# Patient Record
Sex: FEMALE | Race: WHITE | HISPANIC OR LATINO | Employment: UNEMPLOYED | URBAN - METROPOLITAN AREA
[De-identification: names, ages, dates, MRNs, and addresses within clinical notes are randomized per-mention and may not be internally consistent; named-entity substitution may affect disease eponyms.]

---

## 2018-01-11 NOTE — RESULT NOTES
Verified Results  (1) CBC/PLT/DIFF 29Apr2016 12:26PM [facility]     Test Name Result Flag Reference   WBC COUNT 4 32 Thousand/uL  4 31-10 16   RBC COUNT 4 75 Million/uL  3 81-5 12   HEMOGLOBIN 13 6 g/dL  11 5-15 4   HEMATOCRIT 40 6 %  34 8-46  1   MCV 86 fL  82-98   MCH 28 6 pg  26 8-34 3   MCHC 33 5 g/dL  31 4-37 4   RDW 13 5 %  11 6-15 1   MPV 10 7 fL  8 9-12 7   PLATELET COUNT 359 Thousands/uL  149-390   nRBC AUTOMATED 0 /100 WBCs     NEUTROPHILS RELATIVE PERCENT 53 %  43-75   LYMPHOCYTES RELATIVE PERCENT 33 %  14-44   MONOCYTES RELATIVE PERCENT 6 %  4-12   EOSINOPHILS RELATIVE PERCENT 7 % H 0-6   BASOPHILS RELATIVE PERCENT 1 %  0-1   NEUTROPHILS ABSOLUTE COUNT 2 29 Thousands/µL  1 85-7 62   LYMPHOCYTES ABSOLUTE COUNT 1 44 Thousands/µL  0 60-4 47   MONOCYTES ABSOLUTE COUNT 0 25 Thousand/µL  0 17-1 22   EOSINOPHILS ABSOLUTE COUNT 0 30 Thousand/µL  0 00-0 61   BASOPHILS ABSOLUTE COUNT 0 04 Thousands/µL  0 00-0 10     (1) COMPREHENSIVE METABOLIC PANEL [id number] 75:00WG [facility]   National Kidney Disease Education Program recommendations are as follows:  GFR calculation is accurate only with a steady state creatinine  Chronic Kidney disease less than 60 ml/min/1 73 sq  meters  Kidney failure less than 15 ml/min/1 73 sq  meters  Test Name Result Flag Reference   GLUCOSE,RANDM 75 mg/dL     If the patient is fasting, the ADA then defines impaired fasting glucose as > 100 mg/dL and diabetes as > or equal to 123 mg/dL     SODIUM 141 mmol/L  136-145   POTASSIUM 3 8 mmol/L  3 5-5 3   CHLORIDE 104 mmol/L  100-108   CARBON DIOXIDE 30 mmol/L  21-32   ANION GAP (CALC) 7 mmol/L  4-13   BLOOD UREA NITROGEN 12 mg/dL  5-25   CREATININE 0 70 mg/dL  0 60-1 30   Standardized to IDMS reference method   CALCIUM 8 7 mg/dL  8 3-10 1   BILI, TOTAL 0 60 mg/dL  0 20-1 00   ALK PHOSPHATAS 72 U/L     ALT (SGPT) 27 U/L  12-78   AST(SGOT) 20 U/L  5-45   ALBUMIN 3 5 g/dL  3 5-5 0   TOTAL PROTEIN 7 1 g/dL  6 4-8 2   eGFR Non-African American      >60 0 ml/min/1 73sq m     (1) URINALYSIS w URINE C/S REFLEX (will reflex a microscopy if leukocytes, occult blood, or nitrites are not within normal limits) 29Apr2016 12:26PM [facility]     Test Name Result Flag Reference   COLOR Yellow     CLARITY Clear     PH UA 5 5  4 5-8 0   LEUKOCYTE ESTERASE UA Negative  Negative   NITRITE UA Negative  Negative   PROTEIN UA Negative mg/dl  Negative   GLUCOSE UA Negative mg/dl  Negative   KETONES UA Negative mg/dl  Negative   UROBILINOGEN UA 0 2 E U /dl  0 2, 1 0 E U /dl   BILIRUBIN UA Negative  Negative   BLOOD UA Negative  Negative, Trace-Intact   SPECIFIC GRAVITY UA >=1 030  1 003-1 030

## 2021-04-15 ENCOUNTER — NURSE TRIAGE (OUTPATIENT)
Dept: OTHER | Facility: OTHER | Age: 46
End: 2021-04-15

## 2021-04-16 NOTE — TELEPHONE ENCOUNTER
Reason for Disposition   MILD difficulty breathing (e g , minimal/no SOB at rest, SOB with walking, pulse <100)    Answer Assessment - Initial Assessment Questions  1  COVID-19 DIAGNOSIS: "Who made your Coronavirus (COVID-19) diagnosis?" "Was it confirmed by a positive lab test?" If not diagnosed by a HCP, ask "Are there lots of cases (community spread) where you live?" (See public health department website, if unsure)      Test positive at outside agency  2  COVID-19 EXPOSURE: "Was there any known exposure to COVID before the symptoms began?" CDC Definition of close contact: within 6 feet (2 meters) for a total of 15 minutes or more over a 24-hour period  Unsure, works at "[employer]ve  3  ONSET: "When did the COVID-19 symptoms start?"       4/12  4  WORST SYMPTOM: "What is your worst symptom?" (e g , cough, fever, shortness of breath, muscle aches)      Fever  5  COUGH: "Do you have a cough?" If so, ask: "How bad is the cough?"        Intermittent  6  FEVER: "Do you have a fever?" If so, ask: "What is your temperature, how was it measured, and when did it start?"      100 5 earlier and 99 now  7  RESPIRATORY STATUS: "Describe your breathing?" (e g , shortness of breath, wheezing, unable to speak)       Intermittent shortness of breath  8  BETTER-SAME-WORSE: "Are you getting better, staying the same or getting worse compared to yesterday?"  If getting worse, ask, "In what way?"      Worse  9  HIGH RISK DISEASE: "Do you have any chronic medical problems?" (e g , asthma, heart or lung disease, weak immune system, obesity, etc )      Denies  10  PREGNANCY: "Is there any chance you are pregnant?" "When was your last menstrual period?"        Denies, LMP: last month  11   OTHER SYMPTOMS: "Do you have any other symptoms?"  (e g , chills, fatigue, headache, loss of smell or taste, muscle pain, sore throat; new loss of smell or taste especially support the diagnosis of COVID-19)        Loss of taste and smell, headache, body aches    Protocols used: CORONAVIRUS (COVID-19) DIAGNOSED OR SUSPECTED-ADULT-AH

## 2021-04-16 NOTE — TELEPHONE ENCOUNTER
Regarding: covid symptoms  #needs Lithuanian translater#  ----- Message from Brandi Olguin sent at 4/15/2021 11:18 PM EDT -----  "100 7, headache, chills and body aches, no smell or taste"

## 2021-04-17 ENCOUNTER — HOSPITAL ENCOUNTER (EMERGENCY)
Facility: HOSPITAL | Age: 46
Discharge: HOME | End: 2021-04-18
Attending: EMERGENCY MEDICINE | Admitting: EMERGENCY MEDICINE
Payer: COMMERCIAL

## 2021-04-17 ENCOUNTER — APPOINTMENT (EMERGENCY)
Dept: RADIOLOGY | Facility: HOSPITAL | Age: 46
End: 2021-04-17
Payer: COMMERCIAL

## 2021-04-17 VITALS
DIASTOLIC BLOOD PRESSURE: 57 MMHG | RESPIRATION RATE: 18 BRPM | TEMPERATURE: 99.4 F | HEART RATE: 68 BPM | SYSTOLIC BLOOD PRESSURE: 135 MMHG | OXYGEN SATURATION: 96 %

## 2021-04-17 DIAGNOSIS — U07.1 COVID-19: Primary | ICD-10-CM

## 2021-04-17 LAB
ALBUMIN SERPL BCP-MCNC: 3.3 G/DL (ref 3.5–5)
ALP SERPL-CCNC: 91 U/L (ref 46–116)
ALT SERPL W P-5'-P-CCNC: 70 U/L (ref 12–78)
ANION GAP SERPL CALCULATED.3IONS-SCNC: 6 MMOL/L (ref 4–13)
AST SERPL W P-5'-P-CCNC: 46 U/L (ref 5–45)
BASOPHILS # BLD AUTO: 0.01 THOUSANDS/ΜL (ref 0–0.1)
BASOPHILS NFR BLD AUTO: 0 % (ref 0–1)
BILIRUB SERPL-MCNC: 0.34 MG/DL (ref 0.2–1)
BUN SERPL-MCNC: 8 MG/DL (ref 5–25)
CALCIUM ALBUM COR SERPL-MCNC: 9.9 MG/DL (ref 8.3–10.1)
CALCIUM SERPL-MCNC: 9.3 MG/DL (ref 8.3–10.1)
CHLORIDE SERPL-SCNC: 103 MMOL/L (ref 100–108)
CO2 SERPL-SCNC: 28 MMOL/L (ref 21–32)
CREAT SERPL-MCNC: 0.73 MG/DL (ref 0.6–1.3)
EOSINOPHIL # BLD AUTO: 0.02 THOUSAND/ΜL (ref 0–0.61)
EOSINOPHIL NFR BLD AUTO: 1 % (ref 0–6)
ERYTHROCYTE [DISTWIDTH] IN BLOOD BY AUTOMATED COUNT: 14 % (ref 11.6–15.1)
GFR SERPL CREATININE-BSD FRML MDRD: 98 ML/MIN/1.73SQ M
GLUCOSE SERPL-MCNC: 105 MG/DL (ref 65–140)
HCT VFR BLD AUTO: 41.9 % (ref 34.8–46.1)
HGB BLD-MCNC: 13.7 G/DL (ref 11.5–15.4)
IMM GRANULOCYTES # BLD AUTO: 0.01 THOUSAND/UL (ref 0–0.2)
IMM GRANULOCYTES NFR BLD AUTO: 0 % (ref 0–2)
LYMPHOCYTES # BLD AUTO: 0.76 THOUSANDS/ΜL (ref 0.6–4.47)
LYMPHOCYTES NFR BLD AUTO: 19 % (ref 14–44)
MCH RBC QN AUTO: 27.7 PG (ref 26.8–34.3)
MCHC RBC AUTO-ENTMCNC: 32.7 G/DL (ref 31.4–37.4)
MCV RBC AUTO: 85 FL (ref 82–98)
MONOCYTES # BLD AUTO: 0.25 THOUSAND/ΜL (ref 0.17–1.22)
MONOCYTES NFR BLD AUTO: 6 % (ref 4–12)
NEUTROPHILS # BLD AUTO: 2.87 THOUSANDS/ΜL (ref 1.85–7.62)
NEUTS SEG NFR BLD AUTO: 74 % (ref 43–75)
NRBC BLD AUTO-RTO: 0 /100 WBCS
PLATELET # BLD AUTO: 146 THOUSANDS/UL (ref 149–390)
PMV BLD AUTO: 11 FL (ref 8.9–12.7)
POTASSIUM SERPL-SCNC: 4.1 MMOL/L (ref 3.5–5.3)
PROT SERPL-MCNC: 7.2 G/DL (ref 6.4–8.2)
RBC # BLD AUTO: 4.95 MILLION/UL (ref 3.81–5.12)
SODIUM SERPL-SCNC: 137 MMOL/L (ref 136–145)
WBC # BLD AUTO: 3.92 THOUSAND/UL (ref 4.31–10.16)

## 2021-04-17 PROCEDURE — 96374 THER/PROPH/DIAG INJ IV PUSH: CPT

## 2021-04-17 PROCEDURE — 85025 COMPLETE CBC W/AUTO DIFF WBC: CPT | Performed by: EMERGENCY MEDICINE

## 2021-04-17 PROCEDURE — 96361 HYDRATE IV INFUSION ADD-ON: CPT

## 2021-04-17 PROCEDURE — 93005 ELECTROCARDIOGRAM TRACING: CPT

## 2021-04-17 PROCEDURE — 36415 COLL VENOUS BLD VENIPUNCTURE: CPT | Performed by: EMERGENCY MEDICINE

## 2021-04-17 PROCEDURE — 99285 EMERGENCY DEPT VISIT HI MDM: CPT | Performed by: EMERGENCY MEDICINE

## 2021-04-17 PROCEDURE — 80053 COMPREHEN METABOLIC PANEL: CPT | Performed by: EMERGENCY MEDICINE

## 2021-04-17 PROCEDURE — 99284 EMERGENCY DEPT VISIT MOD MDM: CPT

## 2021-04-17 PROCEDURE — 71045 X-RAY EXAM CHEST 1 VIEW: CPT

## 2021-04-17 RX ORDER — ONDANSETRON 2 MG/ML
4 INJECTION INTRAMUSCULAR; INTRAVENOUS ONCE
Status: COMPLETED | OUTPATIENT
Start: 2021-04-17 | End: 2021-04-17

## 2021-04-17 RX ORDER — ACETAMINOPHEN 325 MG/1
975 TABLET ORAL ONCE
Status: COMPLETED | OUTPATIENT
Start: 2021-04-17 | End: 2021-04-17

## 2021-04-17 RX ADMIN — ACETAMINOPHEN 975 MG: 325 TABLET, FILM COATED ORAL at 22:45

## 2021-04-17 RX ADMIN — SODIUM CHLORIDE 1000 ML: 0.9 INJECTION, SOLUTION INTRAVENOUS at 22:45

## 2021-04-17 RX ADMIN — ONDANSETRON 4 MG: 2 INJECTION INTRAMUSCULAR; INTRAVENOUS at 22:45

## 2021-04-18 RX ORDER — ONDANSETRON 4 MG/1
4 TABLET, ORALLY DISINTEGRATING ORAL EVERY 6 HOURS PRN
Qty: 10 TABLET | Refills: 0 | Status: SHIPPED | OUTPATIENT
Start: 2021-04-18 | End: 2021-06-22

## 2021-04-18 NOTE — ED ATTENDING ATTESTATION
4/17/2021  Veena Coughlin DO, saw and evaluated the patient  I have discussed the patient with the resident/non-physician practitioner and agree with the resident's/non-physician practitioner's findings, Plan of Care, and MDM as documented in the resident's/non-physician practitioner's note, except where noted  All available labs and Radiology studies were reviewed  I was present for key portions of any procedure(s) performed by the resident/non-physician practitioner and I was immediately available to provide assistance  At this point I agree with the current assessment done in the Emergency Department  I have conducted an independent evaluation of this patient a history and physical is as follows:    49 yo female w/recent dx COVID19 c/o feeling unwell in general  HA myalgia, dyspnea, dry cough, n/v/d  Decreased PO intake  SpO2 96%  Lungs CTAB    Imp: COVID19 plan: CBC, BMP, CXR, amb SpO2, tx sx, reassess   Likely will be able to d/c home, continue symptomatic tx and isolation      ED Course         Critical Care Time  Procedures

## 2021-04-18 NOTE — DISCHARGE INSTRUCTIONS
Tylenol and motrin for pain as needed  Drink plenty of fluids  Monitor your oxygen level at home, return to the hospital if persistently <90%  Return to the emergency department for severe worsening of pain, persistent vomiting with inability to drink, increased difficulty breathing, any other new or concerning symptoms

## 2021-04-18 NOTE — ED PROVIDER NOTES
History  Chief Complaint   Patient presents with    Fever - 9 weeks to 74 years     pt states "I got diagnosed with CPVID-19 on Tuesday  I feel terrible"  pt states she had a 104 fever at home  HPI  51 yo female with no significant known PMH presents to the ED with concern for cough, congestion, SOB, chest tightness/pressure, fevers, chills, myalgias, headache, nausea, vomiting, and diarrhea x 5 days  Pt was diagnosed positive with COVID 4 days ago  States she has been taking ibuprofen without significant relief  Last dose was 400mg at 2000  Reports she has vomited twice today and has not had much to eat or drink  States she has lost her sense of taste and smell  Denies blood in stool, urinary sxs, abdominal pain, lightheadedness, numbness, weakness, leg swelling, hx DVT/PE, recent surgery, recent travel  Pt has not been monitoring her oxygen saturation at home  She does not have a primary care physician  None       History reviewed  No pertinent past medical history  History reviewed  No pertinent surgical history  History reviewed  No pertinent family history  I have reviewed and agree with the history as documented  E-Cigarette/Vaping     E-Cigarette/Vaping Substances     Social History     Tobacco Use    Smoking status: Never Smoker    Smokeless tobacco: Never Used   Substance Use Topics    Alcohol use: Never     Frequency: Never    Drug use: Not on file        Review of Systems   Constitutional: Positive for chills, fatigue and fever  HENT: Positive for congestion  Negative for rhinorrhea and sore throat  Respiratory: Positive for chest tightness and shortness of breath  Cardiovascular: Positive for chest pain (pressure)  Negative for leg swelling  Gastrointestinal: Positive for diarrhea, nausea and vomiting  Negative for abdominal pain and blood in stool  Genitourinary: Negative for dysuria and hematuria  Musculoskeletal: Positive for myalgias   Negative for arthralgias, gait problem, neck pain and neck stiffness  Skin: Negative for rash  Neurological: Positive for headaches  Negative for dizziness, syncope, weakness, light-headedness and numbness  All other systems reviewed and are negative  Physical Exam  ED Triage Vitals [04/17/21 2202]   Temperature Pulse Respirations Blood Pressure SpO2   99 4 °F (37 4 °C) 68 18 135/57 96 %      Temp Source Heart Rate Source Patient Position - Orthostatic VS BP Location FiO2 (%)   Oral Monitor Lying Right arm --      Pain Score       5             Orthostatic Vital Signs  Vitals:    04/17/21 2202   BP: 135/57   Pulse: 68   Patient Position - Orthostatic VS: Lying       Physical Exam  Vitals signs and nursing note reviewed  Constitutional:       General: She is not in acute distress  Appearance: She is well-developed  She is not diaphoretic  HENT:      Head: Normocephalic and atraumatic  Right Ear: External ear normal       Left Ear: External ear normal       Nose: Nose normal       Mouth/Throat:      Mouth: Mucous membranes are moist    Eyes:      Conjunctiva/sclera: Conjunctivae normal       Pupils: Pupils are equal, round, and reactive to light  Neck:      Musculoskeletal: Full passive range of motion without pain, normal range of motion and neck supple  No neck rigidity  Cardiovascular:      Rate and Rhythm: Normal rate and regular rhythm  Heart sounds: Normal heart sounds  No murmur  No friction rub  No gallop  Pulmonary:      Effort: Pulmonary effort is normal  No respiratory distress  Breath sounds: Normal breath sounds  No wheezing, rhonchi or rales  Chest:      Chest wall: Tenderness present  Abdominal:      General: Bowel sounds are normal  There is no distension  Palpations: Abdomen is soft  There is no mass  Tenderness: There is no abdominal tenderness  There is no guarding or rebound  Musculoskeletal: Normal range of motion        Right lower leg: She exhibits no tenderness and no swelling  No edema  Left lower leg: She exhibits no tenderness and no swelling  No edema  Lymphadenopathy:      Cervical: No cervical adenopathy  Skin:     General: Skin is warm and dry  Neurological:      Mental Status: She is alert and oriented to person, place, and time  Cranial Nerves: No cranial nerve deficit  Sensory: No sensory deficit           ED Medications  Medications   sodium chloride 0 9 % bolus 1,000 mL (0 mL Intravenous Stopped 4/18/21 0051)   ondansetron (ZOFRAN) injection 4 mg (4 mg Intravenous Given 4/17/21 2245)   acetaminophen (TYLENOL) tablet 975 mg (975 mg Oral Given 4/17/21 2245)       Diagnostic Studies  Results Reviewed     Procedure Component Value Units Date/Time    Comprehensive metabolic panel [[id number]]  (Abnormal) Collected: 04/17/21 2244    Lab Status: Final result Specimen: Blood from Arm, Right Updated: 04/17/21 2317     Sodium 137 mmol/L      Potassium 4 1 mmol/L      Chloride 103 mmol/L      CO2 28 mmol/L      ANION GAP 6 mmol/L      BUN 8 mg/dL      Creatinine 0 73 mg/dL      Glucose 105 mg/dL      Calcium 9 3 mg/dL      Corrected Calcium 9 9 mg/dL      AST 46 U/L      ALT 70 U/L      Alkaline Phosphatase 91 U/L      Total Protein 7 2 g/dL      Albumin 3 3 g/dL      Total Bilirubin 0 34 mg/dL      eGFR 98 ml/min/1 73sq m     Narrative:      [place] guidelines for Chronic Kidney Disease (CKD):     Stage 1 with normal or high GFR (GFR > 90 mL/min/1 73 square meters)    Stage 2 Mild CKD (GFR = 60-89 mL/min/1 73 square meters)    Stage 3A Moderate CKD (GFR = 45-59 mL/min/1 73 square meters)    Stage 3B Moderate CKD (GFR = 30-44 mL/min/1 73 square meters)    Stage 4 Severe CKD (GFR = 15-29 mL/min/1 73 square meters)    Stage 5 End Stage CKD (GFR <15 mL/min/1 73 square meters)  Note: GFR calculation is accurate only with a steady state creatinine    CBC and differential [[id number]]  (Abnormal) Collected: 04/17/21 2244    Lab Status: Final result Specimen: Blood from Arm, Right Updated: 04/17/21 2252     WBC 3 92 Thousand/uL      RBC 4 95 Million/uL      Hemoglobin 13 7 g/dL      Hematocrit 41 9 %      MCV 85 fL      MCH 27 7 pg      MCHC 32 7 g/dL      RDW 14 0 %      MPV 11 0 fL      Platelets 356 Thousands/uL      nRBC 0 /100 WBCs      Neutrophils Relative 74 %      Immat GRANS % 0 %      Lymphocytes Relative 19 %      Monocytes Relative 6 %      Eosinophils Relative 1 %      Basophils Relative 0 %      Neutrophils Absolute 2 87 Thousands/µL      Immature Grans Absolute 0 01 Thousand/uL      Lymphocytes Absolute 0 76 Thousands/µL      Monocytes Absolute 0 25 Thousand/µL      Eosinophils Absolute 0 02 Thousand/µL      Basophils Absolute 0 01 Thousands/µL                  XR chest 1 view portable   ED Interpretation by Dominguez Shipley MD (04/17 2243)   No focal consolidation, effusion, or pneumothorax appreciated            Procedures  Procedures      ED Course  ED Course as of Apr 18 1109   Sat Apr 17, 2021   2343 EKG sinus bradycardia rate 56  Normal axis  Nonspecific ST elevation V1  T wave inversion V1  Qtc 403  No prior for comparison  Normal EKG  Sun Apr 18, 2021   0006 Pt reports she is feeling a little better  Will discharge with strict return precautions and PCP follow up                                          MDM  Number of Diagnoses or Management Options  COVID-19:   49 yo female with hx recent COVID dx presenting with cough, congestion, SOB, chest tightness/pressure, fevers, chills, myalgias, headache, nausea, vomiting, and diarrhea consistent with COVID infection  Pt with normal vital signs in the ED  Will obtain CBC, CMP, EKG, CXR, to evaluate for leukocytosis, anemia, electrolyte abnormality, renal dysfunction, hepatic dysfunction, acute cardiac abnormality, focal pneumonia, pneumothorax  Will treat with tylenol, zofran, IV fluids, and reassess    If no acute findings and ambulatory pulse ox normal in the ED pt can likely be discharged with strict return precautions and follow up with [facility] clinic  Disposition  Final diagnoses:   [id number]     Time reflects when diagnosis was documented in both MDM as applicable and the Disposition within this note     Time User Action Codes Description Comment    4/18/2021 12:07 AM Nick Gandhi Add [U07 1] COVID-19       ED Disposition     ED Disposition Condition Date/Time Comment    Discharge Stable Sun Apr 18, 2021 12:07 AM Violetta Issa discharge to home/self care  Follow-up Information     Follow up With Specialties Details Why Contact Info Additional [facility] Internal Medicine Schedule an appointment as soon as possible for a visit in 1 week or your primary care physician [place] [phone] [place] [phone]  [facility] [address], [address] [place] [address], [phone]          There are no discharge medications for this patient  No discharge procedures on file  PDMP Review     None           ED Provider  Attending physically available and evaluated Violetta Issa I managed the patient along with the ED Attending      Electronically Signed by         Claudia Ramos MD  04/18/21 2927

## 2021-04-18 NOTE — ED NOTES
Pt ambulated up and down Zone 4 with oxygen constant at 94%/95%       Carol Ann Espino  04/17/21 5781

## 2021-04-20 LAB
ATRIAL RATE: 56 BPM
P AXIS: 58 DEGREES
PR INTERVAL: 188 MS
QRS AXIS: 55 DEGREES
QRSD INTERVAL: 80 MS
QT INTERVAL: 418 MS
QTC INTERVAL: 403 MS
T WAVE AXIS: 53 DEGREES
VENTRICULAR RATE: 56 BPM

## 2021-04-20 PROCEDURE — 93010 ELECTROCARDIOGRAM REPORT: CPT | Performed by: INTERNAL MEDICINE

## 2021-04-21 ENCOUNTER — APPOINTMENT (EMERGENCY)
Dept: RADIOLOGY | Facility: HOSPITAL | Age: 46
End: 2021-04-21
Payer: COMMERCIAL

## 2021-04-21 ENCOUNTER — HOSPITAL ENCOUNTER (OUTPATIENT)
Facility: HOSPITAL | Age: 46
Setting detail: OBSERVATION
Discharge: HOME | End: 2021-04-22
Attending: EMERGENCY MEDICINE | Admitting: INTERNAL MEDICINE
Payer: COMMERCIAL

## 2021-04-21 DIAGNOSIS — N94.9 ADNEXAL CYST: ICD-10-CM

## 2021-04-21 DIAGNOSIS — M79.10 MYALGIA: ICD-10-CM

## 2021-04-21 DIAGNOSIS — U07.1 COVID-19 VIRUS INFECTION: ICD-10-CM

## 2021-04-21 DIAGNOSIS — U07.1 COVID-19: ICD-10-CM

## 2021-04-21 DIAGNOSIS — R10.9 ABDOMINAL PAIN: Primary | ICD-10-CM

## 2021-04-21 LAB
ALBUMIN SERPL BCP-MCNC: 3.1 G/DL (ref 3.5–5)
ALP SERPL-CCNC: 90 U/L (ref 46–116)
ALT SERPL W P-5'-P-CCNC: 58 U/L (ref 12–78)
ANION GAP SERPL CALCULATED.3IONS-SCNC: 5 MMOL/L (ref 4–13)
APAP SERPL-MCNC: 3 UG/ML (ref 10–20)
AST SERPL W P-5'-P-CCNC: 54 U/L (ref 5–45)
ATRIAL RATE: 81 BPM
BASOPHILS # BLD AUTO: 0.01 THOUSANDS/ΜL (ref 0–0.1)
BASOPHILS NFR BLD AUTO: 0 % (ref 0–1)
BILIRUB SERPL-MCNC: 0.36 MG/DL (ref 0.2–1)
BILIRUB UR QL STRIP: NEGATIVE
BUN SERPL-MCNC: 5 MG/DL (ref 5–25)
CALCIUM ALBUM COR SERPL-MCNC: 9.7 MG/DL (ref 8.3–10.1)
CALCIUM SERPL-MCNC: 9 MG/DL (ref 8.3–10.1)
CHLORIDE SERPL-SCNC: 104 MMOL/L (ref 100–108)
CLARITY UR: CLEAR
CO2 SERPL-SCNC: 29 MMOL/L (ref 21–32)
COLOR UR: YELLOW
CREAT SERPL-MCNC: 0.61 MG/DL (ref 0.6–1.3)
EOSINOPHIL # BLD AUTO: 0.02 THOUSAND/ΜL (ref 0–0.61)
EOSINOPHIL NFR BLD AUTO: 1 % (ref 0–6)
ERYTHROCYTE [DISTWIDTH] IN BLOOD BY AUTOMATED COUNT: 13.6 % (ref 11.6–15.1)
EXT PREG TEST URINE: NEGATIVE
EXT. CONTROL ED NAV: NORMAL
GFR SERPL CREATININE-BSD FRML MDRD: 108 ML/MIN/1.73SQ M
GLUCOSE SERPL-MCNC: 100 MG/DL (ref 65–140)
GLUCOSE UR STRIP-MCNC: NEGATIVE MG/DL
HCT VFR BLD AUTO: 42.9 % (ref 34.8–46.1)
HGB BLD-MCNC: 13.9 G/DL (ref 11.5–15.4)
HGB UR QL STRIP.AUTO: NEGATIVE
IMM GRANULOCYTES # BLD AUTO: 0.01 THOUSAND/UL (ref 0–0.2)
IMM GRANULOCYTES NFR BLD AUTO: 0 % (ref 0–2)
KETONES UR STRIP-MCNC: ABNORMAL MG/DL
LEUKOCYTE ESTERASE UR QL STRIP: NEGATIVE
LIPASE SERPL-CCNC: 59 U/L (ref 73–393)
LYMPHOCYTES # BLD AUTO: 0.72 THOUSANDS/ΜL (ref 0.6–4.47)
LYMPHOCYTES NFR BLD AUTO: 19 % (ref 14–44)
MCH RBC QN AUTO: 27.2 PG (ref 26.8–34.3)
MCHC RBC AUTO-ENTMCNC: 32.4 G/DL (ref 31.4–37.4)
MCV RBC AUTO: 84 FL (ref 82–98)
MONOCYTES # BLD AUTO: 0.21 THOUSAND/ΜL (ref 0.17–1.22)
MONOCYTES NFR BLD AUTO: 6 % (ref 4–12)
NEUTROPHILS # BLD AUTO: 2.84 THOUSANDS/ΜL (ref 1.85–7.62)
NEUTS SEG NFR BLD AUTO: 74 % (ref 43–75)
NITRITE UR QL STRIP: NEGATIVE
NRBC BLD AUTO-RTO: 0 /100 WBCS
P AXIS: 75 DEGREES
PH UR STRIP.AUTO: 7.5 [PH]
PLATELET # BLD AUTO: 172 THOUSANDS/UL (ref 149–390)
PMV BLD AUTO: 10.7 FL (ref 8.9–12.7)
POTASSIUM SERPL-SCNC: 3.7 MMOL/L (ref 3.5–5.3)
PR INTERVAL: 160 MS
PROT SERPL-MCNC: 7.3 G/DL (ref 6.4–8.2)
PROT UR STRIP-MCNC: NEGATIVE MG/DL
QRS AXIS: 50 DEGREES
QRSD INTERVAL: 88 MS
QT INTERVAL: 352 MS
QTC INTERVAL: 408 MS
RBC # BLD AUTO: 5.11 MILLION/UL (ref 3.81–5.12)
SODIUM SERPL-SCNC: 138 MMOL/L (ref 136–145)
SP GR UR STRIP.AUTO: 1.02 (ref 1–1.03)
T WAVE AXIS: 76 DEGREES
TROPONIN I SERPL-MCNC: <0.02 NG/ML
UROBILINOGEN UR QL STRIP.AUTO: 1 E.U./DL
VENTRICULAR RATE: 81 BPM
WBC # BLD AUTO: 3.81 THOUSAND/UL (ref 4.31–10.16)

## 2021-04-21 PROCEDURE — 36415 COLL VENOUS BLD VENIPUNCTURE: CPT | Performed by: STUDENT IN AN ORGANIZED HEALTH CARE EDUCATION/TRAINING PROGRAM

## 2021-04-21 PROCEDURE — NC001 PR NO CHARGE: Performed by: INTERNAL MEDICINE

## 2021-04-21 PROCEDURE — 71275 CT ANGIOGRAPHY CHEST: CPT

## 2021-04-21 PROCEDURE — 83735 ASSAY OF MAGNESIUM: CPT | Performed by: STUDENT IN AN ORGANIZED HEALTH CARE EDUCATION/TRAINING PROGRAM

## 2021-04-21 PROCEDURE — 96376 TX/PRO/DX INJ SAME DRUG ADON: CPT

## 2021-04-21 PROCEDURE — 82728 ASSAY OF FERRITIN: CPT | Performed by: STUDENT IN AN ORGANIZED HEALTH CARE EDUCATION/TRAINING PROGRAM

## 2021-04-21 PROCEDURE — 96375 TX/PRO/DX INJ NEW DRUG ADDON: CPT

## 2021-04-21 PROCEDURE — 96361 HYDRATE IV INFUSION ADD-ON: CPT

## 2021-04-21 PROCEDURE — 74174 CTA ABD&PLVS W/CONTRAST: CPT

## 2021-04-21 PROCEDURE — 93010 ELECTROCARDIOGRAM REPORT: CPT | Performed by: INTERNAL MEDICINE

## 2021-04-21 PROCEDURE — 83690 ASSAY OF LIPASE: CPT | Performed by: STUDENT IN AN ORGANIZED HEALTH CARE EDUCATION/TRAINING PROGRAM

## 2021-04-21 PROCEDURE — 99285 EMERGENCY DEPT VISIT HI MDM: CPT | Performed by: EMERGENCY MEDICINE

## 2021-04-21 PROCEDURE — 85025 COMPLETE CBC W/AUTO DIFF WBC: CPT | Performed by: STUDENT IN AN ORGANIZED HEALTH CARE EDUCATION/TRAINING PROGRAM

## 2021-04-21 PROCEDURE — 80143 DRUG ASSAY ACETAMINOPHEN: CPT | Performed by: STUDENT IN AN ORGANIZED HEALTH CARE EDUCATION/TRAINING PROGRAM

## 2021-04-21 PROCEDURE — 99285 EMERGENCY DEPT VISIT HI MDM: CPT

## 2021-04-21 PROCEDURE — 81025 URINE PREGNANCY TEST: CPT | Performed by: STUDENT IN AN ORGANIZED HEALTH CARE EDUCATION/TRAINING PROGRAM

## 2021-04-21 PROCEDURE — 87086 URINE CULTURE/COLONY COUNT: CPT | Performed by: STUDENT IN AN ORGANIZED HEALTH CARE EDUCATION/TRAINING PROGRAM

## 2021-04-21 PROCEDURE — 86140 C-REACTIVE PROTEIN: CPT | Performed by: STUDENT IN AN ORGANIZED HEALTH CARE EDUCATION/TRAINING PROGRAM

## 2021-04-21 PROCEDURE — G1004 CDSM NDSC: HCPCS

## 2021-04-21 PROCEDURE — 96374 THER/PROPH/DIAG INJ IV PUSH: CPT

## 2021-04-21 PROCEDURE — 81003 URINALYSIS AUTO W/O SCOPE: CPT | Performed by: STUDENT IN AN ORGANIZED HEALTH CARE EDUCATION/TRAINING PROGRAM

## 2021-04-21 PROCEDURE — 93005 ELECTROCARDIOGRAM TRACING: CPT

## 2021-04-21 PROCEDURE — 80053 COMPREHEN METABOLIC PANEL: CPT | Performed by: STUDENT IN AN ORGANIZED HEALTH CARE EDUCATION/TRAINING PROGRAM

## 2021-04-21 PROCEDURE — 84484 ASSAY OF TROPONIN QUANT: CPT | Performed by: STUDENT IN AN ORGANIZED HEALTH CARE EDUCATION/TRAINING PROGRAM

## 2021-04-21 RX ORDER — HYDROMORPHONE HCL/PF 1 MG/ML
0.5 SYRINGE (ML) INJECTION ONCE
Status: COMPLETED | OUTPATIENT
Start: 2021-04-21 | End: 2021-04-21

## 2021-04-21 RX ORDER — ONDANSETRON 2 MG/ML
4 INJECTION INTRAMUSCULAR; INTRAVENOUS ONCE
Status: COMPLETED | OUTPATIENT
Start: 2021-04-21 | End: 2021-04-21

## 2021-04-21 RX ORDER — PANTOPRAZOLE SODIUM 20 MG/1
20 TABLET, DELAYED RELEASE ORAL
Status: DISCONTINUED | OUTPATIENT
Start: 2021-04-21 | End: 2021-04-22 | Stop reason: HOSPADM

## 2021-04-21 RX ORDER — MELATONIN
1000 DAILY
Status: DISCONTINUED | OUTPATIENT
Start: 2021-04-21 | End: 2021-04-22 | Stop reason: HOSPADM

## 2021-04-21 RX ORDER — SODIUM CHLORIDE, SODIUM GLUCONATE, SODIUM ACETATE, POTASSIUM CHLORIDE, MAGNESIUM CHLORIDE, SODIUM PHOSPHATE, DIBASIC, AND POTASSIUM PHOSPHATE .53; .5; .37; .037; .03; .012; .00082 G/100ML; G/100ML; G/100ML; G/100ML; G/100ML; G/100ML; G/100ML
200 INJECTION, SOLUTION INTRAVENOUS CONTINUOUS
Status: DISCONTINUED | OUTPATIENT
Start: 2021-04-21 | End: 2021-04-22 | Stop reason: HOSPADM

## 2021-04-21 RX ORDER — HYDROMORPHONE HCL/PF 1 MG/ML
0.5 SYRINGE (ML) INJECTION ONCE
Status: DISCONTINUED | OUTPATIENT
Start: 2021-04-21 | End: 2021-04-22 | Stop reason: HOSPADM

## 2021-04-21 RX ORDER — SUCRALFATE 1 G/1
1 TABLET ORAL
Status: DISCONTINUED | OUTPATIENT
Start: 2021-04-21 | End: 2021-04-22 | Stop reason: HOSPADM

## 2021-04-21 RX ADMIN — ONDANSETRON 4 MG: 2 INJECTION INTRAMUSCULAR; INTRAVENOUS at 18:45

## 2021-04-21 RX ADMIN — SODIUM CHLORIDE 1000 ML: 0.9 INJECTION, SOLUTION INTRAVENOUS at 18:45

## 2021-04-21 RX ADMIN — HYDROMORPHONE HYDROCHLORIDE 0.5 MG: 1 INJECTION, SOLUTION INTRAMUSCULAR; INTRAVENOUS; SUBCUTANEOUS at 19:03

## 2021-04-21 RX ADMIN — ONDANSETRON 4 MG: 2 INJECTION INTRAMUSCULAR; INTRAVENOUS at 20:30

## 2021-04-21 RX ADMIN — IOHEXOL 100 ML: 350 INJECTION, SOLUTION INTRAVENOUS at 19:42

## 2021-04-21 NOTE — ED PROVIDER NOTES
History  Chief Complaint   Patient presents with    Flu Symptoms     Covid positive 10 days ago symptoms not getting better     Patient is a 77-year-old female, with no significant past medical history, who presents to the emergency department for 10 days of worsening COVID-19 symptoms, including myalgias, fatigue, abdominal pain, chest pain, and cough  Patient states she was seen here on the 17th for identical symptoms, but was ultimately discharged  She states since then, the symptoms have only gotten worse  Patient states the abdominal pain has become so bad, she cannot take it anymore  She states she has tried taking Tylenol and ibuprofen but has not had any relief  Associated symptoms include fever, chills, constipation, nausea, and vomiting  Patient denies any urinary symptoms, back pain, headaches, vision changes, or any other new worsening symptoms  Of note, patient states the pain is to the point where she does not want to wake up in the morning any more  She does endorse passive suicidal ideation, but denies any intent or plan of wanting to end her life  She denies any prior history of suicide ideation or attempts  She denies any homicidal ideation  She denies any auditory visual hallucinations  Last normal menstrual period 2 weeks ago  Prior to Admission Medications   Prescriptions Last Dose Informant Patient Reported? Taking?   ondansetron (ZOFRAN-ODT) 4 mg disintegrating tablet Not Taking at Unknown time  No No   Sig: Take 1 tablet (4 mg total) by mouth every 6 (six) hours as needed for nausea or vomiting   Patient not taking: Reported on 4/21/2021      Facility-Administered Medications: None       History reviewed  No pertinent past medical history  Past Surgical History:   Procedure Laterality Date    COSMETIC SURGERY Bilateral     Breast implants       History reviewed  No pertinent family history    I have reviewed and agree with the history as documented  E-Cigarette/Vaping     E-Cigarette/Vaping Substances     Social History     Tobacco Use    Smoking status: Never Smoker    Smokeless tobacco: Never Used   Substance Use Topics    Alcohol use: Never     Frequency: Never    Drug use: Not on file        Review of Systems   Constitutional: Positive for chills, fatigue and fever  HENT: Positive for sore throat  Negative for trouble swallowing and voice change  Eyes: Negative for photophobia and visual disturbance  Respiratory: Positive for cough  Negative for shortness of breath  Cardiovascular: Positive for chest pain  Negative for leg swelling  Gastrointestinal: Positive for abdominal pain, constipation, nausea and vomiting  Negative for diarrhea  Genitourinary: Negative for difficulty urinating, dysuria and hematuria  Musculoskeletal: Positive for myalgias  Negative for back pain, neck pain and neck stiffness  Skin: Negative for rash and wound  Neurological: Negative for dizziness and headaches  Psychiatric/Behavioral: Negative for agitation and confusion  All other systems reviewed and are negative  Physical Exam  ED Triage Vitals   Temperature Pulse Respirations Blood Pressure SpO2   04/21/21 1807 04/21/21 1807 04/21/21 1807 04/21/21 1807 04/21/21 1807   98 8 °F (37 1 °C) 105 20 147/68 94 %      Temp Source Heart Rate Source Patient Position - Orthostatic VS BP Location FiO2 (%)   04/21/21 1807 04/21/21 1807 -- -- --   Oral Monitor         Pain Score       04/21/21 1903       8             Orthostatic Vital Signs  Vitals:    04/21/21 1930 04/21/21 2032 04/21/21 2230 04/22/21 0029   BP: 123/68 121/67 113/67 115/63   Pulse: 68 64 64 65       Physical Exam  Vitals signs and nursing note reviewed  Constitutional:       General: She is not in acute distress  Appearance: She is well-developed  She is not diaphoretic  HENT:      Head: Normocephalic and atraumatic        Right Ear: External ear normal       Left Ear: External ear normal       Nose: Nose normal    Eyes:      General: Lids are normal  No scleral icterus  Neck:      Musculoskeletal: Normal range of motion and neck supple  Cardiovascular:      Rate and Rhythm: Normal rate and regular rhythm  Heart sounds: Normal heart sounds  No murmur  No friction rub  No gallop  Pulmonary:      Effort: Pulmonary effort is normal  No respiratory distress  Breath sounds: Normal breath sounds  No wheezing or rales  Abdominal:      Palpations: Abdomen is soft  Tenderness: There is abdominal tenderness in the right lower quadrant and periumbilical area  There is no guarding or rebound  Musculoskeletal: Normal range of motion  General: No deformity  Skin:     General: Skin is warm and dry  Capillary Refill: Capillary refill takes less than 2 seconds  Neurological:      General: No focal deficit present  Mental Status: She is alert  Psychiatric:         Mood and Affect: Mood normal  Affect is tearful  Behavior: Behavior normal          Thought Content: Thought content does not include homicidal ideation  Thought content does not include homicidal or suicidal plan           ED Medications  Medications   HYDROmorphone (DILAUDID) injection 0 5 mg (0 5 mg Intravenous Not Given 4/21/21 2033)   cholecalciferol (VITAMIN D3) tablet 1,000 Units (0 Units Oral Hold 4/22/21 0032)   enoxaparin (LOVENOX) subcutaneous injection 30 mg (has no administration in time range)   pantoprazole (PROTONIX) EC tablet 20 mg (0 mg Oral Hold 4/22/21 0033)   sucralfate (CARAFATE) tablet 1 g (0 g Oral Hold 4/22/21 0033)   multi-electrolyte (PLASMALYTE-A/ISOLYTE-S PH 7 4) IV solution (100 mL/hr Intravenous New Bag 4/22/21 0026)   ondansetron (ZOFRAN) injection 4 mg (4 mg Intravenous Given 4/21/21 1845)   sodium chloride 0 9 % bolus 1,000 mL (0 mL Intravenous Stopped 4/21/21 1945)   HYDROmorphone (DILAUDID) injection 0 5 mg (0 5 mg Intravenous Given 4/21/21 1903) iohexol (OMNIPAQUE) 350 MG/ML injection (MULTI-DOSE) 100 mL (100 mL Intravenous Given 4/21/21 1942)   ondansetron (ZOFRAN) injection 4 mg (4 mg Intravenous Given 4/21/21 2030)   metoclopramide (REGLAN) injection 10 mg (10 mg Intravenous Given 4/22/21 0025)       Diagnostic Studies  Results Reviewed     Procedure Component Value Units Date/Time    D-dimer, quantitative [[id number]] Collected: 04/22/21 0032    Lab Status:  In process Specimen: Blood from Arm, Right Updated: 04/22/21 0041    Magnesium [[id number]]     Lab Status: No result Specimen: Blood     Ferritin [[id number]]     Lab Status: No result Specimen: Blood     C-reactive protein [[id number]]     Lab Status: No result Specimen: Blood     Acetaminophen level-"If concentration is detectable, please discuss with medical  on call " [[id number]]  (Abnormal) Collected: 04/21/21 1844    Lab Status: Final result Specimen: Blood from Arm, Right Updated: 04/21/21 2117     Acetaminophen Level 3 ug/mL     Troponin I [[id number]]  (Normal) Collected: 04/21/21 1844    Lab Status: Final result Specimen: Blood from Arm, Right Updated: 04/21/21 1936     Troponin I <0 02 ng/mL     Comprehensive metabolic panel [[id number]]  (Abnormal) Collected: 04/21/21 1844    Lab Status: Final result Specimen: Blood from Arm, Right Updated: 04/21/21 1914     Sodium 138 mmol/L      Potassium 3 7 mmol/L      Chloride 104 mmol/L      CO2 29 mmol/L      ANION GAP 5 mmol/L      BUN 5 mg/dL      Creatinine 0 61 mg/dL      Glucose 100 mg/dL      Calcium 9 0 mg/dL      Corrected Calcium 9 7 mg/dL      AST 54 U/L      ALT 58 U/L      Alkaline Phosphatase 90 U/L      Total Protein 7 3 g/dL      Albumin 3 1 g/dL      Total Bilirubin 0 36 mg/dL      eGFR 108 ml/min/1 73sq m     Narrative:      [place] guidelines for Chronic Kidney Disease (CKD):     Stage 1 with normal or high GFR (GFR > 90 mL/min/1 73 square meters)    Stage 2 Mild CKD (GFR = 60-89 mL/min/1 73 square meters)    Stage 3A Moderate CKD (GFR = 45-59 mL/min/1 73 square meters)    Stage 3B Moderate CKD (GFR = 30-44 mL/min/1 73 square meters)    Stage 4 Severe CKD (GFR = 15-29 mL/min/1 73 square meters)    Stage 5 End Stage CKD (GFR <15 mL/min/1 73 square meters)  Note: GFR calculation is accurate only with a steady state creatinine    Lipase [[id number]]  (Abnormal) Collected: 04/21/21 1844    Lab Status: Final result Specimen: Blood from Arm, Right Updated: 04/21/21 1914     Lipase 59 u/L     UA w Reflex to Microscopic w Reflex to Culture [[id number]]  (Abnormal) Collected: 04/21/21 1853    Lab Status: Final result Specimen: Urine, Clean Catch Updated: 04/21/21 1911     Color, UA Yellow     Clarity, UA Clear     Specific [place], UA 1 020     pH, UA 7 5     Leukocytes, UA Negative     Nitrite, UA Negative     Protein, UA Negative mg/dl      Glucose, UA Negative mg/dl      Ketones, UA Trace mg/dl      Urobilinogen, UA 1 0 E U /dl      Bilirubin, UA Negative     Blood, UA Negative     URINE COMMENT --    Urine culture [[id number]] Collected: 04/21/21 1853    Lab Status:  In process Specimen: Urine, Clean Catch Updated: 04/21/21 1911    CBC and differential [[id number]]  (Abnormal) Collected: 04/21/21 1844    Lab Status: Final result Specimen: Blood from Arm, Right Updated: 04/21/21 1901     WBC 3 81 Thousand/uL      RBC 5 11 Million/uL      Hemoglobin 13 9 g/dL      Hematocrit 42 9 %      MCV 84 fL      MCH 27 2 pg      MCHC 32 4 g/dL      RDW 13 6 %      MPV 10 7 fL      Platelets 795 Thousands/uL      nRBC 0 /100 WBCs      Neutrophils Relative 74 %      Immat GRANS % 0 %      Lymphocytes Relative 19 %      Monocytes Relative 6 %      Eosinophils Relative 1 %      Basophils Relative 0 %      Neutrophils Absolute 2 84 Thousands/µL      Immature Grans Absolute 0 01 Thousand/uL      Lymphocytes Absolute 0 72 Thousands/µL      Monocytes Absolute 0 21 Thousand/µL      Eosinophils Absolute 0 02 Thousand/µL      Basophils Absolute 0 01 Thousands/µL     POCT pregnancy, urine [[id number]]  (Normal) Resulted: 04/21/21 1854    Lab Status: Final result Updated: 04/21/21 1854     EXT PREG TEST UR (Ref: Negative) negative     Control valid                 CTA dissection protocol chest abdomen pelvis w wo contrast   Final Result by Andi Lundborg, MD (04/21 2032)      1  Thoracic, abdominal aorta and branches demonstrate no aneurysm or dissection  No thrombus  2   Bilateral peripheral confluent ill-defined groundglass changes  In the setting of clinically suspected/proven COVID-19, the above lung parenchymal findings on CT indicate intermediate confidence level for COVID-19       3  Circumferential cecal wall thickening suggested to be correlated clinically and/or  ultrasound  Fluid in the endometrial canal   Bilateral adnexal cysts              Workstation performed: [id number]               Procedures  ECG 12 Lead Documentation Only    Date/Time: 4/21/2021 6:35 PM  Performed by: Charline Culp DO  Authorized by: Charline Culp DO     ECG reviewed by me, the ED Provider: yes    Patient location:  ED  Interpretation:     Interpretation: normal    Rate:     ECG rate:  81    ECG rate assessment: normal    Rhythm:     Rhythm: sinus rhythm    Ectopy:     Ectopy: none    QRS:     QRS axis:  Normal  Conduction:     Conduction: normal    ST segments:     ST segments:  Normal  T waves:     T waves: normal            ED Course  ED Course as of Apr 22 0105 Wed Apr 21, 2021 1855 PREGNANCY TEST URINE: negative   1902 WBC(!): 3 81   1902 Hemoglobin: 13 9   1902 Platelet Count: 1500 [facility], UA(!): Trace   1915 Lipase(!): 59   1915 Sodium: 138   1915 Potassium: 3 7   1915 eGFR: 108   1915 Creatinine: 0 61   1953 Troponin I: <0 02   2039 CTA dissection protocol chest abdomen pelvis w wo contrast                                       MDM  Number of Diagnoses or Management Options  Abdominal pain:   COVID-19:   Myalgia:   Diagnosis management comments: Patient is a 50 y o  female who presented to the ED for worsening COVID-19 symptoms  In the ED, patient was tachycardic, hypertensive, not tachypneic and afebrile  Significant physical exam findings include periumbilical and RLQ abdominal pain  Differential includes but is not limited to:  Dissection, COVID-19, mesenteric ischemia, colitis, pancreatitis, diverticulitis, appendicitis  Plan:  CBC, CMP, lipase, UA, urine preg, EKG, troponin, CT abdomen pelvis with contrast    ED interventions:  Dilaudid, Zofran, IV fluids    Findings: CT A/P showed "Bilateral peripheral confluent ill-defined groundglass changes  In the setting of clinically suspected/proven COVID-19, the above lung parenchymal findings on CT indicate intermediate confidence level for COVID-19  Circumferential cecal wall thickening suggested to be correlated clinically and/or  ultrasound  Fluid in the endometrial canal   Bilateral adnexal cysts " Labs were otherwise unremarkable  Reassessment: Patient was re-evaluated  States her abdominal pain initially improved, but the pain has returned and is severe  States she is fearful of going home 2/2 to the pain  Will give another dose of dilaudid and zofran  Disposition:  Admit for intractable abdominal pain        Portions of the record may have been created with voice recognition software  Occasional wrong word or "sound a like" substitutions may have occurred due to the inherent limitations of voice recognition software  Read the chart carefully and recognize, using context, where substitutions have occurred           Amount and/or Complexity of Data Reviewed  Clinical lab tests: ordered and reviewed  Tests in the radiology section of CPT®: reviewed and ordered  Tests in the medicine section of CPT®: ordered and reviewed  Review and summarize past medical records: yes  Independent visualization of images, tracings, or specimens: yes    Risk of Complications, Morbidity, and/or Mortality  Presenting problems: high  Diagnostic procedures: high  Management options: high    Patient Progress  Patient progress: stable      Disposition  Final diagnoses:   Abdominal pain   Myalgia   COVID-19     Time reflects when diagnosis was documented in both MDM as applicable and the Disposition within this note     Time User Action Codes Description Comment    4/21/2021  8:49 PM Silvia Alpha Add [R10 9] Abdominal pain     4/21/2021 10:15 PM Silvia Alpha Add [H25 25] Myalgia     4/21/2021 10:15 PM Eloy TeixeiraShashi Jay [U07 1] COVID-19       ED Disposition     ED Disposition Condition Date/Time Comment    Admit Stable Wed Apr 21, 2021 10:26 PM Case was discussed with Dr Johanna Claire and the patient's admission status was agreed to be Admission Status: observation status to the service of Dr Rossi   Follow-up Information    None         Patient's Medications   Discharge Prescriptions    No medications on file     No discharge procedures on file  PDMP Review     None           ED Provider  Attending physically available and evaluated [facility]  I managed the patient along with the ED Attending      Electronically Signed by         Estuardo Vegas DO  04/22/21 0105

## 2021-04-21 NOTE — Clinical Note
Case was discussed with and the patient's admission status was agreed to be Admission Status: observation status to the service of

## 2021-04-22 VITALS
BODY MASS INDEX: 31.45 KG/M2 | WEIGHT: 177.47 LBS | SYSTOLIC BLOOD PRESSURE: 109 MMHG | HEART RATE: 68 BPM | HEIGHT: 63 IN | TEMPERATURE: 99 F | DIASTOLIC BLOOD PRESSURE: 58 MMHG | RESPIRATION RATE: 16 BRPM | OXYGEN SATURATION: 95 %

## 2021-04-22 PROBLEM — R51.9 HEADACHE: Status: ACTIVE | Noted: 2021-04-22

## 2021-04-22 PROBLEM — R51.9 HEADACHE: Status: RESOLVED | Noted: 2021-04-22 | Resolved: 2021-04-22

## 2021-04-22 LAB
ANION GAP SERPL CALCULATED.3IONS-SCNC: 8 MMOL/L (ref 4–13)
BACTERIA UR CULT: NORMAL
BUN SERPL-MCNC: 6 MG/DL (ref 5–25)
CALCIUM SERPL-MCNC: 8.1 MG/DL (ref 8.3–10.1)
CHLORIDE SERPL-SCNC: 105 MMOL/L (ref 100–108)
CO2 SERPL-SCNC: 26 MMOL/L (ref 21–32)
CREAT SERPL-MCNC: 0.53 MG/DL (ref 0.6–1.3)
CRP SERPL QL: 73 MG/L
D DIMER PPP FEU-MCNC: <0.27 UG/ML FEU
ERYTHROCYTE [DISTWIDTH] IN BLOOD BY AUTOMATED COUNT: 13.7 % (ref 11.6–15.1)
FERRITIN SERPL-MCNC: 146 NG/ML (ref 8–388)
GFR SERPL CREATININE-BSD FRML MDRD: 113 ML/MIN/1.73SQ M
GLUCOSE SERPL-MCNC: 95 MG/DL (ref 65–140)
HCT VFR BLD AUTO: 36.8 % (ref 34.8–46.1)
HGB BLD-MCNC: 12 G/DL (ref 11.5–15.4)
MAGNESIUM SERPL-MCNC: 2.1 MG/DL (ref 1.6–2.6)
MCH RBC QN AUTO: 27.1 PG (ref 26.8–34.3)
MCHC RBC AUTO-ENTMCNC: 32.6 G/DL (ref 31.4–37.4)
MCV RBC AUTO: 83 FL (ref 82–98)
PLATELET # BLD AUTO: 180 THOUSANDS/UL (ref 149–390)
PMV BLD AUTO: 10.5 FL (ref 8.9–12.7)
POTASSIUM SERPL-SCNC: 3.5 MMOL/L (ref 3.5–5.3)
RBC # BLD AUTO: 4.43 MILLION/UL (ref 3.81–5.12)
SODIUM SERPL-SCNC: 139 MMOL/L (ref 136–145)
WBC # BLD AUTO: 3.15 THOUSAND/UL (ref 4.31–10.16)

## 2021-04-22 PROCEDURE — 36415 COLL VENOUS BLD VENIPUNCTURE: CPT | Performed by: STUDENT IN AN ORGANIZED HEALTH CARE EDUCATION/TRAINING PROGRAM

## 2021-04-22 PROCEDURE — 99218 PR INITIAL OBSERVATION CARE/DAY 30 MINUTES: CPT | Performed by: INTERNAL MEDICINE

## 2021-04-22 PROCEDURE — NC001 PR NO CHARGE: Performed by: INTERNAL MEDICINE

## 2021-04-22 PROCEDURE — 85379 FIBRIN DEGRADATION QUANT: CPT | Performed by: STUDENT IN AN ORGANIZED HEALTH CARE EDUCATION/TRAINING PROGRAM

## 2021-04-22 PROCEDURE — 80048 BASIC METABOLIC PNL TOTAL CA: CPT | Performed by: STUDENT IN AN ORGANIZED HEALTH CARE EDUCATION/TRAINING PROGRAM

## 2021-04-22 PROCEDURE — 85027 COMPLETE CBC AUTOMATED: CPT | Performed by: STUDENT IN AN ORGANIZED HEALTH CARE EDUCATION/TRAINING PROGRAM

## 2021-04-22 RX ORDER — PANTOPRAZOLE SODIUM 20 MG/1
40 TABLET, DELAYED RELEASE ORAL
Qty: 45 TABLET | Refills: 3 | Status: SHIPPED | OUTPATIENT
Start: 2021-04-23 | End: 2021-06-22

## 2021-04-22 RX ORDER — MELATONIN
1000 DAILY
Qty: 30 TABLET | Refills: 1 | Status: SHIPPED | OUTPATIENT
Start: 2021-04-23 | End: 2021-06-22

## 2021-04-22 RX ORDER — ONDANSETRON 2 MG/ML
4 INJECTION INTRAMUSCULAR; INTRAVENOUS EVERY 6 HOURS PRN
Status: DISCONTINUED | OUTPATIENT
Start: 2021-04-22 | End: 2021-04-22 | Stop reason: HOSPADM

## 2021-04-22 RX ORDER — METOCLOPRAMIDE HYDROCHLORIDE 5 MG/ML
10 INJECTION INTRAMUSCULAR; INTRAVENOUS ONCE
Status: COMPLETED | OUTPATIENT
Start: 2021-04-22 | End: 2021-04-22

## 2021-04-22 RX ORDER — SUCRALFATE 1 G/1
1 TABLET ORAL
Qty: 30 TABLET | Refills: 1 | Status: SHIPPED | OUTPATIENT
Start: 2021-04-22 | End: 2021-06-22

## 2021-04-22 RX ADMIN — PANTOPRAZOLE SODIUM 20 MG: 20 TABLET, DELAYED RELEASE ORAL at 06:02

## 2021-04-22 RX ADMIN — SUCRALFATE 1 G: 1 TABLET ORAL at 06:02

## 2021-04-22 RX ADMIN — SODIUM CHLORIDE, SODIUM GLUCONATE, SODIUM ACETATE, POTASSIUM CHLORIDE, MAGNESIUM CHLORIDE, SODIUM PHOSPHATE, DIBASIC, AND POTASSIUM PHOSPHATE 100 ML/HR: .53; .5; .37; .037; .03; .012; .00082 INJECTION, SOLUTION INTRAVENOUS at 03:57

## 2021-04-22 RX ADMIN — SODIUM CHLORIDE, SODIUM GLUCONATE, SODIUM ACETATE, POTASSIUM CHLORIDE, MAGNESIUM CHLORIDE, SODIUM PHOSPHATE, DIBASIC, AND POTASSIUM PHOSPHATE 100 ML/HR: .53; .5; .37; .037; .03; .012; .00082 INJECTION, SOLUTION INTRAVENOUS at 00:26

## 2021-04-22 RX ADMIN — Medication 1000 UNITS: at 09:53

## 2021-04-22 RX ADMIN — SODIUM CHLORIDE, SODIUM GLUCONATE, SODIUM ACETATE, POTASSIUM CHLORIDE, MAGNESIUM CHLORIDE, SODIUM PHOSPHATE, DIBASIC, AND POTASSIUM PHOSPHATE 100 ML/HR: .53; .5; .37; .037; .03; .012; .00082 INJECTION, SOLUTION INTRAVENOUS at 03:56

## 2021-04-22 RX ADMIN — METOCLOPRAMIDE HYDROCHLORIDE 10 MG: 5 INJECTION INTRAMUSCULAR; INTRAVENOUS at 00:25

## 2021-04-22 NOTE — ASSESSMENT & PLAN NOTE
Symptoms started 4/11, Tested positive 4/13  Patient is endorsing subjective continued fevers, but has been on tylenol and NSAIDs around the clock since symptoms started  Patient VSS on room air  -patient with b/l clear lungs, on room air, occasional dry cough unchanged from prior, not endorsing SOB    Plan:  -will follow COVID Mild pathway per protocol and per patient's subjective fever today  -contact precautions until patient afebrile for 24 hours while off of antipyretics  -lovenox 30q12 per group C "lower intensity" guidelines  Patient with VTE screen of 1 otherwise, not necessitating DVT prophylaxis  -Vit D 2000 daily  -Vitamin C and Zinc per mild pathway protocol as patient is able to tolerate   Consider holding if patient continues to have nausea as these can be irritating to the stomach

## 2021-04-22 NOTE — ASSESSMENT & PLAN NOTE
Plan:  -gastritis 2/2 excessive NSAID use, with underlying gastroenteritis since axel COVID, although do not suspect that this pain and nausea is primarily due to a viral etiology  Patient consuming at least 11 tablets of Motrin a day for the last 10-12 days  Endorses worsening of epigastric/LUQ abdominal pain with NSAID consumption  Do not suspect this is gynecologic in origin  Patient with history of adnexal cysts, but CT abd/pelvis without free-fluid, and patient without lower quadrant TTP or pain  Menstruates monthly- last 4/6-4/13  Patient does get cramps with menstruation, but is no longer menstruating or spotting  Patient is not pregnant   -symptom control with:   -AVOID NSAIDS   -PPI daily- Prilosec 20 mg qd   -carafate   -Zofran q6h prn (QTc acceptable)  -clear liquid diet until nausea/vomiting controlled  Advance as tolerated  -Pain control with Tylenol PRN  Patient did not respond to dilaudid in ED- Avoid narcotics  -educated patient on avoiding ibuprofen overuse, or any use in the upcoming future

## 2021-04-22 NOTE — PLAN OF CARE
Problem: Potential for Falls  Goal: Patient will remain free of falls  Description: INTERVENTIONS:  - Assess patient frequently for physical needs  -  Identify cognitive and physical deficits and behaviors that affect risk of falls    -  [place] fall precautions as indicated by assessment   - Educate patient/family on patient safety including physical limitations  - Instruct patient to call for assistance with activity based on assessment  - Modify environment to reduce risk of injury  - Consider OT/PT consult to assist with strengthening/mobility  Outcome: Progressing     Problem: PAIN - ADULT  Goal: Verbalizes/displays adequate comfort level or baseline comfort level  Description: Interventions:  - Encourage patient to monitor pain and request assistance  - Assess pain using appropriate pain scale  - Administer analgesics based on type and severity of pain and evaluate response  - Implement non-pharmacological measures as appropriate and evaluate response  - Consider cultural and social influences on pain and pain management  - Notify physician/advanced practitioner if interventions unsuccessful or patient reports new pain  Outcome: Progressing     Problem: INFECTION - ADULT  Goal: Absence or prevention of progression during hospitalization  Description: INTERVENTIONS:  - Assess and monitor for signs and symptoms of infection  - Monitor lab/diagnostic results  - Monitor all insertion sites, i e  indwelling lines, tubes, and drains  - Monitor endotracheal if appropriate and nasal secretions for changes in amount and color  - [place] appropriate cooling/warming therapies per order  - Administer medications as ordered  - Instruct and encourage patient and family to use good hand hygiene technique  - Identify and instruct in appropriate isolation precautions for identified infection/condition  Outcome: Progressing     Problem: SAFETY ADULT  Goal: Patient will remain free of falls  Description: INTERVENTIONS:  - Assess patient frequently for physical needs  -  Identify cognitive and physical deficits and behaviors that affect risk of falls    -  [place] fall precautions as indicated by assessment   - Educate patient/family on patient safety including physical limitations  - Instruct patient to call for assistance with activity based on assessment  - Modify environment to reduce risk of injury  - Consider OT/PT consult to assist with strengthening/mobility  Outcome: Progressing  Goal: Maintain or return to baseline ADL function  Description: INTERVENTIONS:  -  Assess patient's ability to carry out ADLs; assess patient's baseline for ADL function and identify physical deficits which impact ability to perform ADLs (bathing, care of mouth/teeth, toileting, grooming, dressing, etc )  - Assess/evaluate cause of self-care deficits   - Assess range of motion  - Assess patient's mobility; develop plan if impaired  - Assess patient's need for assistive devices and provide as appropriate  - Encourage maximum independence but intervene and supervise when necessary  - Involve family in performance of ADLs  - Assess for home care needs following discharge   - Consider OT consult to assist with ADL evaluation and planning for discharge  - Provide patient education as appropriate  Outcome: Progressing  Goal: Maintain or return mobility status to optimal level  Description: INTERVENTIONS:  - Assess patient's baseline mobility status (ambulation, transfers, stairs, etc )    - Identify cognitive and physical deficits and behaviors that affect mobility  - Identify mobility aids required to assist with transfers and/or ambulation (gait belt, sit-to-stand, lift, walker, cane, etc )  - [place] fall precautions as indicated by assessment  - Record patient progress and toleration of activity level on Mobility SBAR; progress patient to next Phase/Stage  - Instruct patient to call for assistance with activity based on assessment  - Consider rehabilitation consult to assist with strengthening/weightbearing, etc   Outcome: Progressing     Problem: DISCHARGE PLANNING  Goal: Discharge to home or other facility with appropriate resources  Description: INTERVENTIONS:  - Identify barriers to discharge w/patient and caregiver  - Arrange for needed discharge resources and transportation as appropriate  - Identify discharge learning needs (meds, wound care, etc )  - Arrange for interpretive services to assist at discharge as needed  - Refer to Case Management Department for coordinating discharge planning if the patient needs post-hospital services based on physician/advanced practitioner order or complex needs related to functional status, cognitive ability, or social support system  Outcome: Progressing     Problem: Knowledge Deficit  Goal: Patient/family/caregiver demonstrates understanding of disease process, treatment plan, medications, and discharge instructions  Description: Complete learning assessment and assess knowledge base    Interventions:  - Provide teaching at level of understanding  - Provide teaching via preferred learning methods  Outcome: Progressing     Problem: RESPIRATORY - ADULT  Goal: Achieves optimal ventilation and oxygenation  Description: INTERVENTIONS:  - Assess for changes in respiratory status  - Assess for changes in mentation and behavior  - Position to facilitate oxygenation and minimize respiratory effort  - Oxygen administered by appropriate delivery if ordered  - Initiate smoking cessation education as indicated  - Encourage broncho-pulmonary hygiene including cough, deep breathe, Incentive Spirometry  - Assess the need for suctioning and aspirate as needed  - Assess and instruct to report SOB or any respiratory difficulty  - Respiratory Therapy support as indicated  Outcome: Progressing     Problem: GASTROINTESTINAL - ADULT  Goal: Minimal or absence of nausea and/or vomiting  Description: INTERVENTIONS:  - Administer IV fluids if ordered to ensure adequate hydration  - Maintain NPO status until nausea and vomiting are resolved  - Nasogastric tube if ordered  - Administer ordered antiemetic medications as needed  - Provide nonpharmacologic comfort measures as appropriate  - Advance diet as tolerated, if ordered  - Consider nutrition services referral to assist patient with adequate nutrition and appropriate food choices  Outcome: Progressing  Goal: Maintains or returns to baseline bowel function  Description: INTERVENTIONS:  - Assess bowel function  - Encourage oral fluids to ensure adequate hydration  - Administer IV fluids if ordered to ensure adequate hydration  - Administer ordered medications as needed  - Encourage mobilization and activity  - Consider nutritional services referral to assist patient with adequate nutrition and appropriate food choices  Outcome: Progressing  Goal: Maintains adequate nutritional intake  Description: INTERVENTIONS:  - Monitor percentage of each meal consumed  - Identify factors contributing to decreased intake, treat as appropriate  - Assist with meals as needed  - Monitor I&O, weight, and lab values if indicated  - Obtain nutrition services referral as needed  Outcome: Progressing

## 2021-04-22 NOTE — ASSESSMENT & PLAN NOTE
Plan:  -supportive care and treatment of n/v  Headache likely due to minimal p o  food and fluid intake the last 24 hours    -IVF hydration, nausea control, avoid NSAIDS  -clear diet - advance as tolerated  -magnesium level >2, no repletion necessary

## 2021-04-22 NOTE — DISCHARGE SUMMARY
INTERNAL MEDICINE RESIDENCY DISCHARGE SUMMARY     Randell Baird   50 y o  female  MRN: [id number]  Room/Bed: /MS 18-5     [facility] 7   Encounter: [id number]    Principal Problem:    Abdominal pain  Active Problems:    COVID-19 virus infection    Headache    Abdominal pain  -likely some degree of gastritis secondary to excessive NSAID use compounded by possible in COVID gastroenteritis  CT with dissection protocol of abdomen and pelvis demonstrated some free fluid endometrium, possible cervical wall thickening and bilateral adnexal cysts as well as some circumferential cecal wall thickening  -counseled on avoiding NSAID use  -increased p o  Intake as tolerated, s/p IVF hydration   -p r n  Zofran  -Protonix 40 mg q d  COVID-19  -tested positive 4/13 with symptom onset 4/11    -remained AVSS during hospitalization without supplemental oxygen requirements  -MVI    DETAILS OF HOSPITAL COURSE     HPI per Dr Donnie Ellis, DO: 47-year-old female with no significant past medical history who returns to [facility] ED after recent visit on 04/17 for COVID, with the complaint of worsening of her COVID-19 symptoms, in addition to worsening abdominal pain  The patient tested positive for COVID-19 on 4/13  In total, patient's symptoms started 10 days (4/11) ago with myalgias, fatigue, cough, chest pain and abdominal pain  When she was seen on the 17th she was stable and sent home for self supportive care and isolation  Patient returns today saying she cannot take her abdominal pain any longer  She endorses subjective fever and chills, nausea, vomiting, anosmia, dysgeusia, headache, and no relief with Tylenol or Motrin      Patient is VSS on presentation, but is endorsing abdominal pain so severe that she "doesn't want to live with it " No active SI however   Patient states she has consumed at minimum 11 ibuprofen a day since symptoms started on 4/11, in addition to at least 5 tylenol a day  Patient states she takes these so frequently because she thought it would help her stomach, but notes that when she takes the motrin it makes her stomach cramp more and she feels nauseous  She has not had a bowel movement in the last 1 5 days because she has had minimal tolerance of p o  intake  She was previously having looser stools 2-3 times a day, early on in her COVID course  She has vomited a few times today- yellow liquid contents  Pain is localized in the LUQ and epigastric area and is cramping in nature, exacerbated by motrin and food       Denying: SOB, worsening cough, productive cough, CP, hematemesis, diarrhea  She received IV fluid hydration and p r n  Zofran during her hospital course with symptomatic relief  She remained afebrile with remainder vital signs stable throughout hospitalization with no electrolyte abnormalities and tolerating clear liquid diet time of discharge  She was counseled to avoid NSAID use  She was provided ambulatory referral to OBGYN for outpatient follow-up of her incidentally noted adnexal cysts  She was also provided information for the [facility] to establish care with a PCP  DISCHARGE INFORMATION     PCP at Discharge: Cliff Cardona provided, encouraged to establish care with PCP as soon as possible    Admitting Provider: Kira Humphreys MD  Admission Date: 4/21/2021    Discharge Provider: Kira Humphreys MD  Discharge Date: 4/22/2021    Discharge Disposition: Home/Self Care  Discharge Condition: stable  Discharge with Lines: no    Discharge Diet: diet as tolerated  Activity Restrictions: none  Test Results Pending at Discharge: none     Discharge Diagnoses:  Principal Problem:    Abdominal pain  Active Problems:    COVID-19 virus infection    Headache  Resolved Problems:    * No resolved hospital problems   *    Consulting Providers:  -none     Diagnostic & Therapeutic Procedures Performed:  Cta Dissection Protocol Chest Abdomen Pelvis W Wo Contrast    Result Date: 4/21/2021  Impression: 1  Thoracic, abdominal aorta and branches demonstrate no aneurysm or dissection  No thrombus  2   Bilateral peripheral confluent ill-defined groundglass changes  In the setting of clinically suspected/proven COVID-19, the above lung parenchymal findings on CT indicate intermediate confidence level for COVID-19  3  Circumferential cecal wall thickening suggested to be correlated clinically and/or  ultrasound  Fluid in the endometrial canal   Bilateral adnexal cysts  Workstation performed: [id number]       Code Status: Level 1 - Full Code  Advance Directive & Living Will: <no information>  Power of :    POLST:      Medications:  -See AVS    Allergies:  No Known Allergies    Physical Exam  Constitutional:       General: She is not in acute distress  Appearance: She is not ill-appearing, toxic-appearing or diaphoretic  Comments: Laying comfortably   HENT:      Head: Normocephalic and atraumatic  Right Ear: External ear normal       Left Ear: External ear normal       Nose: Nose normal  No congestion or rhinorrhea  Mouth/Throat:      Mouth: Mucous membranes are moist       Pharynx: Oropharynx is clear  Eyes:      General: No scleral icterus  Conjunctiva/sclera: Conjunctivae normal       Pupils: Pupils are equal, round, and reactive to light  Neck:      Musculoskeletal: Normal range of motion and neck supple  Cardiovascular:      Rate and Rhythm: Normal rate and regular rhythm  Pulses: Normal pulses  Heart sounds: Normal heart sounds  No murmur  No friction rub  No gallop  Pulmonary:      Effort: Pulmonary effort is normal  No respiratory distress  Breath sounds: Normal breath sounds  No stridor  No wheezing or rales  Comments: Saturating 98% on RA   Abdominal:      General: Abdomen is flat  Bowel sounds are normal  There is no distension  Tenderness: There is no guarding or rebound  Comments: Mild nonspecific TTP   Musculoskeletal:      Right lower leg: No edema  Left lower leg: No edema  Skin:     General: Skin is warm and dry  Capillary Refill: Capillary refill takes less than 2 seconds  Coloration: Skin is not jaundiced  Neurological:      General: No focal deficit present  Mental Status: She is alert and oriented to person, place, and time  FOLLOW-UP     PCP Outpatient Follow-up:  Encouraged to establish care with a PCP within 1 week following discharge    Consulting Providers Follow-up:  none required     Active Issues Requiring Follow-up:   none    Discharge Statement:   I spent 30 minutes minutes discharging the patient  This time was spent on the day of discharge  I had direct contact with the patient on the day of discharge  Additional documentation is required if more than 30 minutes were spent on discharge  Portions of the record may have been created with voice recognition software  Occasional wrong word or "sound a like" substitutions may have occurred due to the inherent limitations of voice recognition software    Read the chart carefully and recognize, using context, where substitutions have occurred     ==  Kp Toure MD  [address]  Internal Medicine, PGY-I

## 2021-04-22 NOTE — PROGRESS NOTES
[facility] Senior Admission Note   Unit/Bed # @DBLINK (LGPHUONG,22586)@ Encounter: [id number]  SOD Team A    Deanne Audrey 50 y o  female [id number]    Patient seen and examined  Reviewed H&P per Dr Amber Weeks  Agree with the assessment and plan as documented  Assessment/Plan: Principal Problem:    Abdominal pain  Active Problems:    COVID-19 virus infection     49 yo F with recent COVID infection presenting with a number of worsening symptoms including worsening abdominal pain as well as worsening cough  She was seen on 4/17 for COVID but discharged to home with recommendation for supportive care and isolation  CTA abdomen and pelvis today shows circumferential cecal wall thickening  UA was checked in ED and unremarkable except for ketones  Pt reports significant NSAID usage prior to admission  Given that pt reports fever within last 24h and worsening cough symptoms, will continue isolation precautions for COVID and obtain baseline labs for mild pathway  Abdominal pain appears to be secondary to gastritis from NSAID usage vs cecal inflammation secondary to COVID  Will treat supportively with clear liquid diet for today but consider advancing diet tomorrow AM if tolerated, continue tylenol PRN pain with trial of prilosec and carafate  Continue gentle hydration given vomiting and poor oral intake with ketones noted on UA       Disposition:  OBSERVATION    Expected LOS: <2 Midnights    Brittany Gibson DO

## 2021-04-22 NOTE — H&P
INTERNAL MEDICINE RESIDENCY ADMISSION H&P     Name: Olga Corcoran   Age & Sex: 50 y o  female   MRN: [id number]  Unit/Bed#: QCA   Encounter: [id number]  Primary Care Provider: No primary care provider on file  Code Status: Level 1 - Full Code  Admission Status: OBSERVATION  Disposition: Patient requires Med/Surg    Admit to team: SOD Team A    ASSESSMENT/PLAN     Principal Problem:    Abdominal pain  Active Problems:    COVID-19 virus infection    Headache      * Abdominal pain  Assessment & Plan  Plan:  -gastritis 2/2 excessive NSAID use, with underlying gastroenteritis since axel COVID, although do not suspect that this pain and nausea is primarily due to a viral etiology  Patient consuming at least 11 tablets of Motrin a day for the last 10-12 days  Endorses worsening of epigastric/LUQ abdominal pain with NSAID consumption  Do not suspect this is gynecologic in origin  Patient with history of adnexal cysts, but CT abd/pelvis without free-fluid, and patient without lower quadrant TTP or pain  Menstruates monthly- last 4/6-4/13  Patient does get cramps with menstruation, but is no longer menstruating or spotting  Patient is not pregnant   -symptom control with:   -AVOID NSAIDS   -PPI daily- Prilosec 20 mg qd   -carafate   -Zofran q6h prn (QTc acceptable)  -clear liquid diet until nausea/vomiting controlled  Advance as tolerated  -Pain control with Tylenol PRN  Patient did not respond to dilaudid in ED- Avoid narcotics  -educated patient on avoiding ibuprofen overuse, or any use in the upcoming future  COVID-19 virus infection  Assessment & Plan  Symptoms started 4/11, Tested positive 4/13  Patient is endorsing subjective continued fevers, but has been on tylenol and NSAIDs around the clock since symptoms started      Patient VSS on room air  -patient with b/l clear lungs, on room air, occasional dry cough unchanged from prior, not endorsing SOB    Plan:  -will follow COVID Mild pathway per protocol and per patient's subjective fever today  -contact precautions until patient afebrile for 24 hours while off of antipyretics  -lovenox 30q12 per group C "lower intensity" guidelines  Patient with VTE screen of 1 otherwise, not necessitating DVT prophylaxis  -Vit D 2000 daily  -Vitamin C and Zinc per mild pathway protocol as patient is able to tolerate  Consider holding if patient continues to have nausea as these can be irritating to the stomach        Headache  Assessment & Plan  Plan:  -supportive care and treatment of n/v  Headache likely due to minimal p o  food and fluid intake the last 24 hours  -IVF hydration, nausea control, avoid NSAIDS  -clear diet - advance as tolerated  -magnesium level >2, no repletion necessary      VTE Pharmacologic Prophylaxis: Enoxaparin (Lovenox)  VTE Mechanical Prophylaxis: sequential compression device    CHIEF COMPLAINT     Chief Complaint   Patient presents with    Flu Symptoms     Covid positive 10 days ago symptoms not getting better      HISTORY OF PRESENT ILLNESS     24-year-old female with no significant past medical history who returns to [facility] ED after recent visit on 04/17 for COVID, with the complaint of worsening of her COVID-19 symptoms, in addition to worsening abdominal pain  The patient tested positive for COVID-19 on 4/13  In total, patient's symptoms started 10 days (4/11) ago with myalgias, fatigue, cough, chest pain and abdominal pain  When she was seen on the 17th she was stable and sent home for self supportive care and isolation  Patient returns today saying she cannot take her abdominal pain any longer  She endorses subjective fever and chills, nausea, vomiting, anosmia, dysgeusia, headache, and no relief with Tylenol or Motrin  Patient is VSS on presentation, but is endorsing abdominal pain so severe that she "doesn't want to live with it " No active SI however   Patient states she has consumed at minimum 11 ibuprofen a day since symptoms started on , in addition to at least 5 tylenol a day  Patient states she takes these so frequently because she thought it would help her stomach, but notes that when she takes the motrin it makes her stomach cramp more and she feels nauseous  She has not had a bowel movement in the last 1 5 days because she has had minimal tolerance of p o  intake  She was previously having looser stools 2-3 times a day, early on in her COVID course  She has vomited a few times today- yellow liquid contents  Pain is localized in the LUQ and epigastric area and is cramping in nature, exacerbated by motrin and food  Denying: SOB, worsening cough, productive cough, CP, hematemesis, diarrhea  (-) EtOH, tobacco, and drug use    REVIEW OF SYSTEMS     Review of Systems   Constitutional: Positive for appetite change (decreased 2/2 anosmia/dysguesia and nausea), chills and fever  Negative for activity change  Respiratory: Positive for cough (unchanged since last ED visit)  Negative for chest tightness, shortness of breath and wheezing  Cardiovascular: Negative for chest pain, palpitations and leg swelling  Gastrointestinal: Positive for abdominal pain, diarrhea (up to 3 loose BM a day, but hasn't had loose stools in a few days), nausea and vomiting  Negative for abdominal distention, blood in stool and constipation  Genitourinary: Negative for dysuria  Neurological: Positive for light-headedness (when going from laying flat to standing) and headaches  Psychiatric/Behavioral: Negative for confusion and self-injury       OBJECTIVE     Vitals:    21 1930 21 2032 21 2230 21 0029   BP: 123/68 121/67 113/67 115/63   Pulse: 68 64 64 65   Resp: 18 18 18 16   Temp:       TempSrc:       SpO2: 97% 97% 95% 98%   Weight:          Temperature:   Temp (24hrs), Av 8 °F (37 1 °C), Min:98 8 °F (37 1 °C), Max:98 8 °F (37 1 °C)    Temperature: 98 8 °F (37 1 °C)  Intake & Output:  I/O        0701 - 04/21 0700 04/21 0701 - 04/22 0700    IV Piggyback  1000    Total Intake(mL/kg)  1000 (12 4)    Net  +1000              Weights:        Body mass index is 31 44 kg/m²  Weight (last 2 days)     Date/Time   Weight    04/21/21 1807   80 5 (177 47)            Physical Exam  Constitutional:       General: She is not in acute distress  Appearance: She is not ill-appearing, toxic-appearing or diaphoretic  HENT:      Mouth/Throat:      Mouth: Mucous membranes are dry  Cardiovascular:      Rate and Rhythm: Normal rate and regular rhythm  Pulses: Normal pulses  Heart sounds: No murmur  Pulmonary:      Effort: Pulmonary effort is normal  No respiratory distress  Breath sounds: Normal breath sounds  No wheezing, rhonchi or rales  Abdominal:      General: There is no distension  Tenderness: There is abdominal tenderness  There is no guarding  Comments: Ivy-umbilical and RUQ   Musculoskeletal:      Right lower leg: No edema  Left lower leg: No edema  Skin:     General: Skin is warm and dry  Neurological:      Mental Status: She is alert and oriented to person, place, and time  Psychiatric:         Mood and Affect: Mood normal          Thought Content: Thought content normal        PAST MEDICAL HISTORY   History reviewed  No pertinent past medical history  PAST SURGICAL HISTORY     Past Surgical History:   Procedure Laterality Date    COSMETIC SURGERY Bilateral     Breast implants     SOCIAL & FAMILY HISTORY     Social History     Substance and Sexual Activity   Alcohol Use Never    Frequency: Never     Substance and Sexual Activity   Alcohol Use Never    Frequency: Never        Substance and Sexual Activity   Drug Use Not on file     Social History     Tobacco Use   Smoking Status Never Smoker   Smokeless Tobacco Never Used     History reviewed  No pertinent family history  LABORATORY DATA     Labs: I have personally reviewed pertinent reports      Results from last 7 days Lab Units 04/21/21  1844 04/17/21  2244   WBC Thousand/uL 3 81* 3 92*   HEMOGLOBIN g/dL 13 9 13 7   HEMATOCRIT % 42 9 41 9   PLATELETS Thousands/uL 172 146*   NEUTROS PCT % 74 74   MONOS PCT % 6 6      Results from last 7 days   Lab Units 04/21/21  1844 04/17/21  2244   POTASSIUM mmol/L 3 7 4 1   CHLORIDE mmol/L 104 103   CO2 mmol/L 29 28   BUN mg/dL 5 8   CREATININE mg/dL 0 61 0 73   CALCIUM mg/dL 9 0 9 3   ALK PHOS U/L 90 91   ALT U/L 58 70   AST U/L 54* 46*     Results from last 7 days   Lab Units 04/21/21  1844   MAGNESIUM mg/dL 2 1                  Results from last 7 days   Lab Units 04/21/21  1844   TROPONIN I ng/mL <0 02     Micro:  No results found for: Shirline Downs, WOUNDCULT, SPUTUMCULTUR  IMAGING & DIAGNOSTIC TESTS     Imaging: I have personally reviewed pertinent reports  Cta Dissection Protocol Chest Abdomen Pelvis W Wo Contrast    Result Date: 4/21/2021  Impression: 1  Thoracic, abdominal aorta and branches demonstrate no aneurysm or dissection  No thrombus  2   Bilateral peripheral confluent ill-defined groundglass changes  In the setting of clinically suspected/proven COVID-19, the above lung parenchymal findings on CT indicate intermediate confidence level for COVID-19  3  Circumferential cecal wall thickening suggested to be correlated clinically and/or  ultrasound  Fluid in the endometrial canal   Bilateral adnexal cysts  Workstation performed: [id number]     EKG, Pathology, and Other Studies: I have personally reviewed pertinent reports  ALLERGIES   No Known Allergies  MEDICATIONS PRIOR TO ARRIVAL     Prior to Admission medications    Medication Sig Start Date End Date Taking?  Authorizing Provider   ondansetron (ZOFRAN-ODT) 4 mg disintegrating tablet Take 1 tablet (4 mg total) by mouth every 6 (six) hours as needed for nausea or vomiting  Patient not taking: Reported on 4/21/2021 4/18/21   Suraj Corona MD     MEDICATIONS ADMINISTERED IN LAST 24 HOURS     Medication Administration - last 24 hours from 04/21/2021 0310 to 04/22/2021 0310       Date/Time Order Dose Route Action Action by     04/21/2021 1845 ondansetron (ZOFRAN) injection 4 mg 4 mg Intravenous [facility]     04/21/2021 1945 sodium chloride 0 9 % bolus 1,000 mL 0 mL Intravenous Stopped [facility], RN     04/21/2021 1845 sodium chloride 0 9 % bolus 1,000 mL 1,000 mL Intravenous [address]  [place], RN     04/21/2021 1903 HYDROmorphone (DILAUDID) injection 0 5 mg 0 5 mg Intravenous Given [facility], RN     04/21/2021 1942 iohexol (OMNIPAQUE) 350 MG/ML injection (MULTI-DOSE) 100 mL 100 mL Intravenous Given Ivan Frees     04/21/2021 2033 HYDROmorphone (DILAUDID) injection 0 5 mg 0 5 mg Intravenous Not Given [facility]     04/21/2021 2030 ondansetron (ZOFRAN) injection 4 mg 4 mg Intravenous Given [facility], RN     04/22/2021 0032 cholecalciferol (VITAMIN D3) tablet 1,000 Units 0 Units Oral Hold Roberto Burdick RN     04/22/2021 0033 pantoprazole (PROTONIX) EC tablet 20 mg 0 mg Oral Hold Roberto Burdick RN     04/22/2021 0033 sucralfate (CARAFATE) tablet 1 g 0 g Oral Hold Roberto Burdick RN     04/22/2021 0026 multi-electrolyte (PLASMALYTE-A/ISOLYTE-S PH 7 4) IV solution 100 mL/hr Intravenous [address] Roberto Burdick RN     04/22/2021 0025 metoclopramide (REGLAN) injection 10 mg 10 mg Intravenous Given Roberto Burdick RN        CURRENT MEDICATIONS     Current Facility-Administered Medications   Medication Dose Route Frequency Provider Last Rate    cholecalciferol  1,000 Units Oral Daily Caprice Judah DO      enoxaparin  30 mg Subcutaneous Daily Caprice DO Judah      HYDROmorphone  0 5 mg Intravenous Once Edna Redgranite, DO      multi-electrolyte  100 mL/hr Intravenous Continuous Caprice Judah,  100 mL/hr (04/22/21 0026)    ondansetron  4 mg Intravenous Q6H PRN Caprice Judah,       pantoprazole  20 mg Oral Early Morning Caprice Shazia Leon, DO      sucralfate  1 g Oral BID AC Kimberli So DO       multi-electrolyte, 100 mL/hr, Last Rate: 100 mL/hr (04/22/21 0026)      ondansetron, 4 mg, Q6H PRN        Admission Time  I spent 30 minutes admitting the patient  This involved direct patient contact where I performed a full history and physical, reviewing previous records, and reviewing laboratory and other diagnostic studies  Portions of the record may have been created with voice recognition software  Occasional wrong word or "sound a like" substitutions may have occurred due to the inherent limitations of voice recognition software    Read the chart carefully and recognize, using context, where substitutions have occurred     ==  Zenia Waller, [address] [facility]  Internal Medicine Residency PGY-1

## 2021-04-22 NOTE — ED ATTENDING ATTESTATION
4/21/2021  I, Renny Kussmaul, DO, saw and evaluated the patient  I have discussed the patient with the resident/non-physician practitioner and agree with the resident's/non-physician practitioner's findings, Plan of Care, and MDM as documented in the resident's/non-physician practitioner's note, except where noted  All available labs and Radiology studies were reviewed  I was present for key portions of any procedure(s) performed by the resident/non-physician practitioner and I was immediately available to provide assistance  At this point I agree with the current assessment done in the Emergency Department  I have conducted an independent evaluation of this patient a history and physical is as follows:    53-year-old female presents for 10 days of were sick COVID-19 symptoms including muscle aches, fatigue, abdominal pain, chest pain and cough  Admits that she occasionally feels short of breath  Was seen here on the 17th for identical symptoms but states since then symptoms only gotten worse  On exam-no acute distress heart regular, no respiratory distress soft with diffuse tenderness    Plan-CTA chest abdomen, check labs, IVF    ED Course         Critical Care Time  Procedures

## 2021-04-22 NOTE — DISCHARGE INSTRUCTIONS
COVID-19 (Coronavirus Disease 2019)   WHAT YOU NEED TO KNOW:   COVID-19 is the disease caused by the novel (new) coronavirus first discovered in December 2019  Coronaviruses generally cause upper respiratory (nose, throat, and lung) infections, such as a cold  The new virus can also cause serious lower respiratory conditions, such as pneumonia or acute respiratory distress syndrome (ARDS)  Anyone can develop serious problems from the new virus, but your risk is higher if you are 65 or older  A weak immune system, diabetes, or a heart or lung condition can also increase your risk  DISCHARGE INSTRUCTIONS:   If you think you or someone you know may be infected:  Do the following to protect others:  · If emergency care is needed,  tell the  about the possible infection, or call ahead and tell the emergency department  · Call a healthcare provider  for instructions if symptoms are mild  Anyone who may be infected should not  arrive without calling first  The provider will need to protect staff members and other patients  · The person who may be infected needs to wear a face covering  while getting medical care  This will help lower the risk of infecting others  Coverings are not used for anyone who is younger than 2 years, has breathing problems, or cannot remove it  The provider can give you instructions for anyone who cannot wear a covering  Call your local emergency number (911 in the [address] [facility]) or go to the emergency department if:   · You have trouble breathing or shortness of breath at rest     · You have chest pain or pressure that lasts longer than 5 minutes  · You become confused or hard to wake  · Your lips or face are blue  · You have a fever of 104°F (40°C) or higher  Call your doctor if:   · You do not  have symptoms of COVID-19 but had close physical contact within 14 days with someone who tested positive      · You have questions or concerns about your condition or care     Medicines: You may need any of the following for mild symptoms:  · Decongestants  help reduce nasal congestion and help you breathe more easily  If you take decongestant pills, they may make you feel restless or cause problems with your sleep  Do not use decongestant sprays for more than a few days  · Cough suppressants  help reduce coughing  Ask your healthcare provider which type of cough medicine is best for you  · Acetaminophen  decreases pain and fever  It is available without a doctor's order  Ask how much to take and how often to take it  Follow directions  Read the labels of all other medicines you are using to see if they also contain acetaminophen, or ask your doctor or pharmacist  Acetaminophen can cause liver damage if not taken correctly  Do not use more than 4 grams (4,000 milligrams) total of acetaminophen in one day  · NSAIDs , such as ibuprofen, help decrease swelling, pain, and fever  NSAIDs can cause stomach bleeding or kidney problems in certain people  If you take blood thinner medicine, always ask your healthcare provider if NSAIDs are safe for you  Always read the medicine label and follow directions  · Take your medicine as directed  Contact your healthcare provider if you think your medicine is not helping or if you have side effects  Tell him or her if you are allergic to any medicine  Keep a list of the medicines, vitamins, and herbs you take  Include the amounts, and when and why you take them  Bring the list or the pill bottles to follow-up visits  Carry your medicine list with you in case of an emergency  How the 2019 coronavirus spreads: The virus spreads quickly and easily  You can become infected if you are in contact with a large amount of the virus, even for a short time  You can also become infected by being around a small amount of virus for a long time   The following are ways the virus is thought to spread, but more information may be coming:  · Droplets are the most common way all coronaviruses spread  The virus can travel in droplets that form when a person talks, coughs, or sneezes  Anyone who breathes in the droplets or gets them in his or her eyes can become infected with the virus  Close personal contact with an infected person is thought to be the main way the virus spreads  Close personal contact means you are within 6 feet (2 meters) of the person  · Person-to-person contact can spread the virus  For example, a person with the virus on his or her hands can spread it by shaking hands with someone  At this time, it does not appear that the virus can be passed to a baby during pregnancy or delivery  The baby can be infected after he or she is born through person-to-person contact  The virus also does not appear to spread in breast milk  If you are pregnant or breastfeeding, talk to your healthcare provider or obstetrician about any concerns you have  · The virus can stay on objects and surfaces  A person can get the virus on his or her hands by touching the object or surface  Infection happens if the person then touches his or her eyes or mouth with unwashed hands  It is not yet known how long the virus can stay on an object or surface  That is why it is important to clean all surfaces that are used regularly  · An infected animal may be able to infect a person who touches it  This may happen at live markets or on a farm  How everyone can lower the risk for COVID-19:  The best way to prevent infection is to avoid anyone who is infected, but this can be hard to do  An infected person can spread the virus before signs or symptoms begin, or even if signs or symptoms never develop  The following can help lower the risk for infection:      · Wash your hands often throughout the day  Use soap and water  Rub your soapy hands together, lacing your fingers  Wash the front and back of each hand, and in between your fingers   Use the fingers of one hand to scrub under the fingernails of the other hand  Wash for at least 20 seconds  Rinse with warm, running water for several seconds  Then dry your hands with a clean towel or paper towel  Use hand  that contains alcohol if soap and water are not available  Do not touch your eyes, nose, or mouth without washing your hands first  Teach children how to wash their hands and use hand   · Cover a sneeze or cough  This prevents droplets from traveling from you to others  Turn your face away and cover your mouth and nose with a tissue  Throw the tissue away  Use the bend of your arm if a tissue is not available  Then wash your hands well with soap and water or use hand   Turn and cover your face if you are around someone who is sneezing or coughing  Teach children how to cover a cough or sneeze  · Follow worldwide, national, and local social distancing guidelines  Social distancing means people avoid close physical contact so the virus cannot spread from one person to another  Keep at least 6 feet (2 meters) between you and others  Also keep this distance from anyone who comes to your home, such as someone making a delivery  · Make a habit of not touching your face  It is not known how long the virus can stay on objects and surfaces  If you get the virus on your hands, you can transfer it to your eyes, nose, or mouth and become infected  You can also transfer it to objects, surfaces, or people  Be aware of what you touch when you go out  Examples include handrails and elevator buttons  Try not to touch anything with bare hands unless it is necessary  Wash your hands before you leave your home and when you return  · Clean and disinfect high-touch surfaces and objects often  Use a disinfecting solution or wipes  You can make a solution by diluting 4 teaspoons of bleach in 1 quart (4 cups) of water   Clean and disinfect even if you think no one living in or coming to your home is infected with the virus  You can wipe items with a disinfecting cloth before you bring them into your home  Wash your hands after you handle anything you bring into your home  · Make your immune system as healthy as possible  A weakened immune system makes you more vulnerable to the new coronavirus  No COVID-19 vaccine is available yet  Vaccines such as the flu and pneumonia vaccines can help your immune system  Your healthcare provider can tell you which vaccines to get, and when to get them  Keep your immune system as strong as possible  Do not smoke  Eat healthy foods, exercise regularly, and try to manage stress  Go to bed and wake up at the same times each day  Social distancing guidelines:  National and local social distancing rules vary  Rules may change over time as restrictions are lifted  Restrictions may return if an outbreak happens where you live  It is important to know and follow all current social distancing rules in your area  The following are general guidelines:  · Limit trips out of your home  You may be able to have food, medicines, and other supplies delivered  If possible, have delivered items left at your door or other area  Try not to have someone hand you an item  You will be so close to the person that the virus can spread between you  · Do not have close physical contact with anyone who does not live in your home  Do not shake hands with, hug, or kiss a person as a greeting  Stand or walk as far from others as possible  If you must use public transportation (such as a bus or subway), try to sit or stand away from others  You can stay safely connected with others through phone calls, e-mail messages, social media websites, and video chats  Check in on anyone who may be having a hard time socially distancing, or who lives alone   Ask administrators at nursing homes or long-term care facilities how you can safely communicate with someone living there     · Wear a cloth face covering around others who do not live in your home  Face coverings help prevent the virus from spreading to others in droplets  You can use a clear face covering if someone needs to read your lips  This is a cloth covering that has plastic over the mouth area so your lips can be seen  Do not use coverings that have breathing valves or vents  The virus can travel out of the valve or vent and be spread to others  Do not take your covering off to talk, cough, or sneeze  Do not use coverings on children younger than 2 years or on anyone who has breathing problems or cannot remove it  · Only allow medical or other necessary professionals into your home  Wear your face covering, and remind professionals to wear a face covering  Remind them to wash their hands when they arrive and before they leave  Do not  let anyone who does not live in your home in, even if the person is not sick  A person can pass the virus to others before symptoms of COVID-19 begin  Some people never even develop symptoms  Children commonly have mild symptoms or no symptoms  It may be hard to tell a child not to hug or kiss you  Explain that this is how he or she can help you stay healthy  · Do not go to someone else's home unless it is necessary  Do not go over to visit, even if the person is lonely  Only go if you need to help him or her  Make sure you both wear face coverings while you are there  · Avoid large gatherings and crowds  Gatherings or crowds of 10 or more individuals can cause the virus to spread  Examples of gatherings include parties, sporting events, Druze services, and conferences  Crowds may form at beaches, [place], and tourist attractions  Protect yourself by staying away from large gatherings and crowds  · Ask your healthcare provider for other ways to have appointments  You may be able to have appointments without having to go into the provider's office   Some providers offer phone, video, or other types of appointments  You may also be able to get prescriptions for a few months of your medicines at a time  · Stay safe if you must go out to work  You may have a job that can only be done outside your home  Keep physical distance between you and other workers as much as possible  Follow your employer's rules so everyone stays safe  If you have COVID-19 and are recovering at home:  Healthcare providers will give you specific instructions to follow  The following are general guidelines to remind you how to keep others safe until you are well:  · Wash your hands often  Use soap and water as much as possible  You can use hand  that contains alcohol if soap and water are not available  Do not share towels with anyone  If you use paper towels, throw them away in a lined trash can kept in your room or area  Use a covered trash can, if possible  · Do not go out of your home unless it is necessary  You may have to go to your healthcare provider's office for check-ups or to get prescription refills  Do not arrive at the provider's office without an appointment  Providers have to make their offices safe for staff and other patients  · Do not have close physical contact with anyone unless it is necessary  Only have close physical contact with a person giving direct care, or a baby or child you must care for  Family members and friends should not visit you  If possible, stay in a separate area or room of your home if you live with others  No one should go into the area or room except to give you care  You can visit with others by phone, video chat, e-mail, or similar systems  It is important to stay connected with others in your life while you recover  · Wear a face covering while others are near you  This can help prevent droplets from spreading the virus when you talk, sneeze, or cough  Put the covering on before anyone comes into your room or area   Remind the person to cover his or her nose and mouth before going in to provide care for you  · Do not share items  Do not share dishes, towels, or other items with anyone  Items need to be washed after you use them  · Protect your baby  Wash your hands with soap and water often throughout the day  Wear a clean face covering while you breastfeed, or while you express or pump breast milk  If possible, ask someone who is well to care for your baby  You can put breast milk in bottles for the person to use, if needed  Talk to your healthcare provider if you have any questions or concerns about caring for or bonding with your baby  He or she will tell you when to bring your baby in for check-ups and vaccines  He or she will also tell you what to do if you think your baby was infected with the new virus  · Do not handle live animals  Until more is known, it is best not to touch, play with, or handle live animals  Some animals, including pets, have been infected with the new coronavirus  Do not handle or care for animals until you are well  Care includes feeding, petting, and cuddling your pet  Do not let your pet lick you or share your food  Ask someone who is not infected to take care of your pet, if possible  If you must care for a pet, wear a face covering  Wash your hands before and after you give care  · Follow directions from your healthcare provider for being around others after you recover  You will need to wait at least 10 days after symptoms first appeared  Then you will need to have no fever for 24 hours without fever medicine, and no other symptoms  A loss of taste or smell may continue for several months  It is considered okay to be around others if this is your only symptom  It is not known for sure if or for how long a recovered person can pass the virus to others  Your provider may give you instructions, such as continuing social distancing or wearing a face covering around others      How to take care of someone who has COVID-19:  If the person lives in another home, arrange for a time to give care  Remember to bring a few pairs of disposable gloves and a cloth face covering  The following are general guidelines to help you safely care for anyone who has COVID-19:  · Wash your hands often  Wash before and after you go into the person's home, area, or room  Throw paper towels away in a lined trash can that has a lid, if possible  · Do not allow others to go near the person  No one should come into the person's home unless it is necessary  If possible, the person should be in a separate area or room if he or she lives with others  Keep the room's door shut unless you need to go in or out  Have others call, video chat, or e-mail the person if he or she is feeling well enough  The person may feel lonely if he or she is kept separate for a long period of time  Safe communication can help him or her stay connected to family and friends  · Make sure the person's room has good air flow  You may be able to open the window if the weather allows  An air conditioner can also be turned on to help air move  · Contact the person before you go in to give care  Make sure the person is wearing a face covering  Remind him or her to wash his or her hands with soap and water  He or she can use hand  that contains alcohol if soap and water are not available  Put on a face covering before you go in to give care  · Wear gloves while you give care and clean  Clean items the person uses often  Clean countertops, cooking surfaces, and the fronts and insides of the microwave and refrigerator  Clean the shower, toilet, the area around the toilet, the sink, the area around the sink, and faucets  Gather used laundry or bedding  Wash and dry items on the warmest settings the fabric allows  Wash dishes and silverware in hot, soapy water or in a   · Anything you throw away needs to go into a lined trash can  When you need to empty the trash, close the open end of the lining and tie it closed  This helps prevent items the virus is on from spilling out of the trash  Remove your gloves and throw them away  Wash your hands  Follow up with your doctor as directed:  Write down your questions so you remember to ask them during your visits  For more information:   · Centers for Disease Control and Prevention  [address] , [address]  Phone: 6- 071 - 540-6603  Web Address: [employer] com br    © Copyright [address] [place] [address] Drive Information is for End User's use only and may not be sold, redistributed or otherwise used for commercial purposes  All illustrations and images included in CareNotes® are the copyrighted property of [employer]  or Barrie Chavarria  The above information is an  only  It is not intended as medical advice for individual conditions or treatments  Talk to your doctor, nurse or pharmacist before following any medical regimen to see if it is safe and effective for you

## 2021-04-22 NOTE — CASE MANAGEMENT
Pt covid+ 4/13  CM TC to pt's room for assessment-no answer  Pt is written for d/c-no current CM needs identified at this time

## 2021-04-22 NOTE — UTILIZATION REVIEW
Initial Clinical Review    Admission: Date/Time/Statement:   Admission Orders (From admission, onward)     Ordered        04/21/21 2227  Place in Observation  Once                   Orders Placed This Encounter   Procedures    Place in Observation     Standing Status:   Standing     Number of Occurrences:   1     Order Specific Question:   Level of Care     Answer:   Med Surg [16]     ED Arrival Information     Expected Arrival Acuity Means of Arrival Escorted By Service Admission Type    - 4/21/2021 17:44 Emergent [facility] Emergency    Arrival Complaint    Covid + Abdominal pain         Chief Complaint   Patient presents with    Flu Symptoms     Covid positive 10 days ago symptoms not getting better     Initial Presentation:   50y Female to ED presents with worsening COVID-19 symptoms including worsening abdominal pain  Also c/o subjective and chills, nausea, vomiting, anosmia, dysgeusia, headache, and no relief with Tylenol or Motrin  She tested positive for COVID-19 on 4/13, symptoms started 10 days ago with myalgias, fatigue, cough, chest pain and abdominal pain  Recent seen in ED on 4/17 cough, congestion, SOB, chest tightness/pressure, fevers, chills, myalgias, headache, nausea, vomiting, and diarrhea, found stable and d/c home to self isolate  Per pt she has taken at minimum 11 ibuprofen a day since 4/11 and at least 5 tylenol a day which usually helps but now with stomach cramp and nausea when taking motrin  Emesis few times today - yellow liquid contents  Pain worsen with motrin and food  Admit Observation level of care for Abdominal pain, COVID-19 virus infection and Headache  Gastritis 2/2 excessive NSAID use, with underlying gastroenteritis since axel COVID, although do not suspect that this pain and nausea is primarily due to a viral etiology  Menstruates monthly- last 4/6-4/13  Patient does get cramps with menstruation, but is no longer menstruating or spotting  Not pregnant  Avoid NSAIDs  PPI daily and Carafate  Antiemetics prn  Clear liquid diet, advance as tolerated  Pain control with prn Tylenol  No relief from Dilaudid in ED - Avoid narcotics  Vit C, Vit D and Zinc for COVID treatment  Lovenox sq q12  IVFs  Ketones noted in UA  Date:    Day 2:     ED Triage Vitals   Temperature Pulse Respirations Blood Pressure SpO2   04/21/21 1807 04/21/21 1807 04/21/21 1807 04/21/21 1807 04/21/21 1807   98 8 °F (37 1 °C) 105 20 147/68 94 %      Temp Source Heart Rate Source Patient Position - Orthostatic VS BP Location FiO2 (%)   04/21/21 1807 04/21/21 1807 -- -- --   Oral Monitor         Pain Score       04/21/21 1903       8          Wt Readings from Last 1 Encounters:   04/21/21 80 5 kg (177 lb 7 5 oz)     Additional Vital Signs:   04/22/21 07:48:30  99 °F (37 2 °C)  68  --  109/58  75  95 %  --   04/22/21 04:06:32  99 4 °F (37 4 °C)  65  --  105/51  69  94 %  --   04/22/21 0300  --  58  16  115/67  86  94 %  --   04/22/21 0029  --  65  16  115/63  --  98 %  --   04/21/21 2230  --  64  18  113/67  85  95 %       Pertinent Labs/Diagnostic Test Results:   4/21 CTA chest/abd/pelvis - Thoracic, abdominal aorta and branches demonstrate no aneurysm or dissection  No thrombus  Bilateral peripheral confluent ill-defined groundglass changes  In the setting of clinically suspected/proven COVID-19, the above lung parenchymal findings on CT indicate intermediate confidence level for COVID-19  Circumferential cecal wall thickening suggested to be correlated clinically and/or  ultrasound    Fluid in the endometrial canal   Bilateral adnexal cysts        Results from last 7 days   Lab Units 04/22/21  0528 04/21/21  1844 04/17/21  2244   WBC Thousand/uL 3 15* 3 81* 3 92*   HEMOGLOBIN g/dL 12 0 13 9 13 7   HEMATOCRIT % 36 8 42 9 41 9   PLATELETS Thousands/uL 180 172 146*   NEUTROS ABS Thousands/µL  --  2 84 2 87         Results from last 7 days   Lab Units 04/22/21  0528 04/21/21  1844 04/17/21  2244   SODIUM mmol/L 139 138 137   POTASSIUM mmol/L 3 5 3 7 4 1   CHLORIDE mmol/L 105 104 103   CO2 mmol/L 26 29 28   ANION GAP mmol/L 8 5 6   BUN mg/dL 6 5 8   CREATININE mg/dL 0 53* 0 61 0 73   EGFR ml/min/1 73sq m 113 108 98   CALCIUM mg/dL 8 1* 9 0 9 3   MAGNESIUM mg/dL  --  2 1  --      Results from last 7 days   Lab Units 04/21/21  1844 04/17/21  2244   AST U/L 54* 46*   ALT U/L 58 70   ALK PHOS U/L 90 91   TOTAL PROTEIN g/dL 7 3 7 2   ALBUMIN g/dL 3 1* 3 3*   TOTAL BILIRUBIN mg/dL 0 36 0 34         Results from last 7 days   Lab Units 04/22/21  0528 04/21/21  1844 04/17/21  2244   GLUCOSE RANDOM mg/dL 95 100 105       Results from last 7 days   Lab Units 04/21/21  1844   TROPONIN I ng/mL <0 02     Results from last 7 days   Lab Units 04/22/21  0032   D-DIMER QUANTITATIVE ug/ml FEU <0 27     Results from last 7 days   Lab Units 04/21/21  1844   FERRITIN ng/mL 146         Results from last 7 days   Lab Units 04/21/21  1844   LIPASE u/L 59*     Results from last 7 days   Lab Units 04/21/21  1844   CRP mg/L 73 0*         Results from last 7 days   Lab Units 04/21/21  1853   CLARITY UA  Clear   COLOR UA  Yellow   SPEC GRAV UA  1 020   PH UA  7 5   GLUCOSE UA mg/dl Negative   KETONES UA mg/dl Trace*   BLOOD UA  Negative   PROTEIN UA mg/dl Negative   NITRITE UA  Negative   BILIRUBIN UA  Negative   UROBILINOGEN UA E U /dl 1 0   LEUKOCYTES UA  Negative       Results from last 7 days   Lab Units 04/21/21  1844   ACETAMINOPHEN LVL ug/mL 3*       ED Treatment:   Medication Administration from 04/21/2021 1744 to 04/22/2021 0330       Date/Time Order Dose Route Action     04/21/2021 1845 ondansetron (ZOFRAN) injection 4 mg 4 mg Intravenous Given     04/21/2021 1845 sodium chloride 0 9 % bolus 1,000 mL 1,000 mL Intravenous New Bag     04/21/2021 1903 HYDROmorphone (DILAUDID) injection 0 5 mg 0 5 mg Intravenous Given     04/21/2021 1942 iohexol (OMNIPAQUE) 350 MG/ML injection (MULTI-DOSE) 100 mL 100 mL Intravenous Given     04/21/2021 2030 ondansetron (ZOFRAN) injection 4 mg 4 mg Intravenous Given     04/22/2021 0026 multi-electrolyte (PLASMALYTE-A/ISOLYTE-S PH 7 4) IV solution 100 mL/hr Intravenous New Bag     04/22/2021 0025 metoclopramide (REGLAN) injection 10 mg 10 mg Intravenous Given        History reviewed  No pertinent past medical history  Present on Admission:  **None**      Admitting Diagnosis: Myalgia [M79 10]  Abdominal pain [R10 9]  Flu-like symptoms [R68 89]  COVID-19 [U07 1]  Age/Sex: 50 y o  female     Admission Orders:  Scheduled Medications:  cholecalciferol, 1,000 Units, Oral, Daily  enoxaparin, 30 mg, Subcutaneous, Daily  HYDROmorphone, 0 5 mg, Intravenous, Once  pantoprazole, 20 mg, Oral, Early Morning  sucralfate, 1 g, Oral, BID AC      Continuous IV Infusions:  multi-electrolyte, 100 mL/hr, Intravenous, Continuous      PRN Meds:  ondansetron, 4 mg, Intravenous, Q6H PRN      Clear liquid diet  IP CONSULT TO CASE MANAGEMENT    Network Utilization Review Department  ATTENTION: Please call with any questions or concerns to [phone] and carefully listen to the prompts so that you are directed to the right person  All voicemails are confidential   Zoila Ortiz all requests for admission clinical reviews, approved or denied determinations and any other requests to dedicated fax number below belonging to the campus where the patient is receiving treatment   List of dedicated fax numbers for the Facilities:  [address] DENIALS (Administrative/Medical Necessity) [phone]   [address] (Maternity/NICU/Pediatrics) [phone]   [address] [place] [address] Dr Surjit Diegoel Shona [phone] [facility] Juancarlos Guerrero [address] [phone]

## 2021-05-17 ENCOUNTER — OFFICE VISIT (OUTPATIENT)
Dept: OBGYN CLINIC | Facility: CLINIC | Age: 46
End: 2021-05-17

## 2021-05-17 VITALS
WEIGHT: 176 LBS | DIASTOLIC BLOOD PRESSURE: 72 MMHG | HEIGHT: 63 IN | SYSTOLIC BLOOD PRESSURE: 119 MMHG | HEART RATE: 67 BPM | BODY MASS INDEX: 31.18 KG/M2

## 2021-05-17 DIAGNOSIS — Z12.31 ENCOUNTER FOR SCREENING MAMMOGRAM FOR MALIGNANT NEOPLASM OF BREAST: ICD-10-CM

## 2021-05-17 DIAGNOSIS — Z01.419 ENCOUNTER FOR WELL WOMAN EXAM WITH ROUTINE GYNECOLOGICAL EXAM: Primary | ICD-10-CM

## 2021-05-17 PROCEDURE — G0145 SCR C/V CYTO,THINLAYER,RESCR: HCPCS | Performed by: OBSTETRICS & GYNECOLOGY

## 2021-05-17 PROCEDURE — S0610 ANNUAL GYNECOLOGICAL EXAMINA: HCPCS | Performed by: OBSTETRICS & GYNECOLOGY

## 2021-05-17 PROCEDURE — 87624 HPV HI-RISK TYP POOLED RSLT: CPT | Performed by: OBSTETRICS & GYNECOLOGY

## 2021-05-17 NOTE — PROGRESS NOTES
Sander Katz is a 50 y o  female who presents for annual well woman exam  Periods are regular every 28-30 days, lasting 5 days  No intermenstrual bleeding, spotting, or discharge  Patient notes a history of pain in her neck  She denies any history of thyroid issues  She denies cold/heat intolerance, skin changes, constipation, diarrhea, palpitations, hair loss  She does not have a primary care provider  Will give a referral for PCP  GYN:  · Denies vaginal discharge, labial erythema or lesions, dyspareunia  · Menarche at 13  · Contraception: condoms  · Patient is  sexually active with one male partner  · Last pap smear was in 2019  Results were negative  However, done in [place] and we don't have the results  · denies gynecologic surgeries  OB:  ·  female  · Pregnancies were uncomplicated  :  · denies dysuria, urinary frequency or urgency  · denies hematuria, flank pain, incontinence  Breast:  · denies breast mass, skin changes, dimpling, reddening, nipple retraction  · denies breast discharge  · Last mammogram was in   Results were negative  · Patient does have a family history of breast, endometrial, or ovarian ca  · Her sister and aunt both have breast cancer  No genetic screening done     General:  · Diet: healthy  · Exercise: very active at her job  · Works at [employer]  · ETOH use: denies  · Tobacco use: denies  · Recreational drug use: denies      Review of Systems  Pertinent items are noted in HPI          Objective      LMP 2021 (Approximate)     General:   alert, appears stated age and cooperative   Heart: regular rate and rhythm, S1, S2 normal, no murmur, click, rub or gallop   Lungs: clear to auscultation bilaterally   Breast: breasts appear normal, no suspicious masses, no skin or nipple changes or axillary nodes   Abdomen: soft, non-tender, without masses or organomegaly   Vulva: normal   Vagina: normal mucosa   Cervix: anteverted, multiparous appearance, no bleeding following Pap and no lesions   Uterus: normal size, mobile, non-tender, normal shape and consistency, retroverted   Adnexa: normal adnexa          Assessment/Plan:    Well Woman Exam   Menses are regular at q30 days, lasting 5days   Patient currently using condoms for contraception  Pap smear collected today, will call patient with results   Last Mammogram 2 years ago, Patient given order for mammo  Discussed healthy diet and exercise   Referral for PCP given to patient   TSH w/ reflex T4 given   Will call with results     Will RTC in 1 year or sooner if any issues arise     Giovani Jefferson MD  OB/GYN PGY-1  5/17/2021  7:27 AM

## 2021-05-19 LAB
HPV HR 12 DNA CVX QL NAA+PROBE: NEGATIVE
HPV16 DNA CVX QL NAA+PROBE: NEGATIVE
HPV18 DNA CVX QL NAA+PROBE: NEGATIVE

## 2021-05-20 LAB
LAB AP GYN PRIMARY INTERPRETATION: NORMAL
Lab: NORMAL

## 2021-05-23 ENCOUNTER — APPOINTMENT (OUTPATIENT)
Dept: LAB | Facility: HOSPITAL | Age: 46
End: 2021-05-23
Payer: COMMERCIAL

## 2021-05-23 DIAGNOSIS — Z01.419 ENCOUNTER FOR WELL WOMAN EXAM WITH ROUTINE GYNECOLOGICAL EXAM: ICD-10-CM

## 2021-05-23 LAB — TSH SERPL DL<=0.05 MIU/L-ACNC: 1.49 UIU/ML (ref 0.36–3.74)

## 2021-05-23 PROCEDURE — 36415 COLL VENOUS BLD VENIPUNCTURE: CPT

## 2021-05-23 PROCEDURE — 84443 ASSAY THYROID STIM HORMONE: CPT

## 2021-05-24 ENCOUNTER — TELEPHONE (OUTPATIENT)
Dept: OBGYN CLINIC | Facility: CLINIC | Age: 46
End: 2021-05-24

## 2021-05-24 NOTE — TELEPHONE ENCOUNTER
----- Message from Mercedez Scruggs MD sent at 5/24/2021  9:47 AM EDT -----  Please call patient and let her know her TSH was WNL  She should follow up with PCP   Thanks

## 2021-05-24 NOTE — TELEPHONE ENCOUNTER
Called pt to inform her that her pap was normal & she should follow up with her PCP in regards to her thyroid

## 2021-06-22 ENCOUNTER — OFFICE VISIT (OUTPATIENT)
Dept: INTERNAL MEDICINE CLINIC | Age: 46
End: 2021-06-22
Payer: COMMERCIAL

## 2021-06-22 ENCOUNTER — APPOINTMENT (OUTPATIENT)
Dept: LAB | Age: 46
End: 2021-06-22
Payer: COMMERCIAL

## 2021-06-22 VITALS
TEMPERATURE: 97.9 F | SYSTOLIC BLOOD PRESSURE: 112 MMHG | WEIGHT: 156.5 LBS | HEIGHT: 64 IN | OXYGEN SATURATION: 99 % | BODY MASS INDEX: 26.72 KG/M2 | HEART RATE: 65 BPM | DIASTOLIC BLOOD PRESSURE: 80 MMHG

## 2021-06-22 DIAGNOSIS — N64.4 BREAST PAIN: ICD-10-CM

## 2021-06-22 DIAGNOSIS — Z01.818 PREOP EXAMINATION: ICD-10-CM

## 2021-06-22 DIAGNOSIS — Z01.818 PREOP EXAMINATION: Primary | ICD-10-CM

## 2021-06-22 LAB
ALBUMIN SERPL BCP-MCNC: 3.6 G/DL (ref 3.5–5)
ALP SERPL-CCNC: 66 U/L (ref 46–116)
ALT SERPL W P-5'-P-CCNC: 37 U/L (ref 12–78)
ANION GAP SERPL CALCULATED.3IONS-SCNC: 3 MMOL/L (ref 4–13)
APTT PPP: 27 SECONDS (ref 23–37)
AST SERPL W P-5'-P-CCNC: 19 U/L (ref 5–45)
BASOPHILS # BLD AUTO: 0.03 THOUSANDS/ΜL (ref 0–0.1)
BASOPHILS NFR BLD AUTO: 1 % (ref 0–1)
BILIRUB SERPL-MCNC: 0.44 MG/DL (ref 0.2–1)
BUN SERPL-MCNC: 13 MG/DL (ref 5–25)
CALCIUM SERPL-MCNC: 9.4 MG/DL (ref 8.3–10.1)
CHLORIDE SERPL-SCNC: 104 MMOL/L (ref 100–108)
CO2 SERPL-SCNC: 30 MMOL/L (ref 21–32)
CREAT SERPL-MCNC: 0.61 MG/DL (ref 0.6–1.3)
EOSINOPHIL # BLD AUTO: 0.15 THOUSAND/ΜL (ref 0–0.61)
EOSINOPHIL NFR BLD AUTO: 2 % (ref 0–6)
ERYTHROCYTE [DISTWIDTH] IN BLOOD BY AUTOMATED COUNT: 14.6 % (ref 11.6–15.1)
GFR SERPL CREATININE-BSD FRML MDRD: 108 ML/MIN/1.73SQ M
GLUCOSE P FAST SERPL-MCNC: 81 MG/DL (ref 65–99)
HCT VFR BLD AUTO: 37.9 % (ref 34.8–46.1)
HGB BLD-MCNC: 12.1 G/DL (ref 11.5–15.4)
IMM GRANULOCYTES # BLD AUTO: 0.02 THOUSAND/UL (ref 0–0.2)
IMM GRANULOCYTES NFR BLD AUTO: 0 % (ref 0–2)
INR PPP: 0.95 (ref 0.84–1.19)
LYMPHOCYTES # BLD AUTO: 1.83 THOUSANDS/ΜL (ref 0.6–4.47)
LYMPHOCYTES NFR BLD AUTO: 29 % (ref 14–44)
MCH RBC QN AUTO: 27.6 PG (ref 26.8–34.3)
MCHC RBC AUTO-ENTMCNC: 31.9 G/DL (ref 31.4–37.4)
MCV RBC AUTO: 87 FL (ref 82–98)
MONOCYTES # BLD AUTO: 0.31 THOUSAND/ΜL (ref 0.17–1.22)
MONOCYTES NFR BLD AUTO: 5 % (ref 4–12)
NEUTROPHILS # BLD AUTO: 4.05 THOUSANDS/ΜL (ref 1.85–7.62)
NEUTS SEG NFR BLD AUTO: 63 % (ref 43–75)
NRBC BLD AUTO-RTO: 0 /100 WBCS
PLATELET # BLD AUTO: 255 THOUSANDS/UL (ref 149–390)
PMV BLD AUTO: 10.8 FL (ref 8.9–12.7)
POTASSIUM SERPL-SCNC: 3.8 MMOL/L (ref 3.5–5.3)
PROT SERPL-MCNC: 7.1 G/DL (ref 6.4–8.2)
PROTHROMBIN TIME: 12.7 SECONDS (ref 11.6–14.5)
RBC # BLD AUTO: 4.38 MILLION/UL (ref 3.81–5.12)
SODIUM SERPL-SCNC: 137 MMOL/L (ref 136–145)
WBC # BLD AUTO: 6.39 THOUSAND/UL (ref 4.31–10.16)

## 2021-06-22 PROCEDURE — 3008F BODY MASS INDEX DOCD: CPT | Performed by: INTERNAL MEDICINE

## 2021-06-22 PROCEDURE — U0005 INFEC AGEN DETEC AMPLI PROBE: HCPCS | Performed by: INTERNAL MEDICINE

## 2021-06-22 PROCEDURE — 99214 OFFICE O/P EST MOD 30 MIN: CPT | Performed by: INTERNAL MEDICINE

## 2021-06-22 PROCEDURE — 85730 THROMBOPLASTIN TIME PARTIAL: CPT

## 2021-06-22 PROCEDURE — U0003 INFECTIOUS AGENT DETECTION BY NUCLEIC ACID (DNA OR RNA); SEVERE ACUTE RESPIRATORY SYNDROME CORONAVIRUS 2 (SARS-COV-2) (CORONAVIRUS DISEASE [COVID-19]), AMPLIFIED PROBE TECHNIQUE, MAKING USE OF HIGH THROUGHPUT TECHNOLOGIES AS DESCRIBED BY CMS-2020-01-R: HCPCS | Performed by: INTERNAL MEDICINE

## 2021-06-22 PROCEDURE — 85025 COMPLETE CBC W/AUTO DIFF WBC: CPT

## 2021-06-22 PROCEDURE — 3725F SCREEN DEPRESSION PERFORMED: CPT | Performed by: INTERNAL MEDICINE

## 2021-06-22 PROCEDURE — 93000 ELECTROCARDIOGRAM COMPLETE: CPT | Performed by: INTERNAL MEDICINE

## 2021-06-22 PROCEDURE — 80053 COMPREHEN METABOLIC PANEL: CPT

## 2021-06-22 PROCEDURE — 85610 PROTHROMBIN TIME: CPT

## 2021-06-22 PROCEDURE — 36415 COLL VENOUS BLD VENIPUNCTURE: CPT

## 2021-06-22 NOTE — PROGRESS NOTES
Assessment/Plan:     Diagnoses and all orders for this visit:    Preop examination  -     Novel Coronavirus (COVID-19), PCR [facility] Collected at [facility]; Future  -     Comprehensive metabolic panel; Future  -     Protime (PT) and Partial Thromboplastin Time (PTT); Future  -     CBC and differential; Future  -     POCT ECG    Breast pain  -     CBC and differential; Future             Subjective:   Chief Complaint   Patient presents with    Breast Problem     has mammogram appt on 6/28/21, concerns about breat implants         Patient ID: Deanne Ventura is a 50 y o  female  HPI   this is a very pleasant 50 years young lady who has a saline breast implant  In [facility] and she is going back again because she is having some symptoms of tenderness and the pain in the left breast medially they skin is slightly rough and maybe take and she wanted to get the blood workup and the preop workup done before she has an appointment in July 1st week  in  [facility]  she does not have any fever or chills no nipple discharges no nausea or vomiting  The following portions of the patient's history were reviewed and updated as appropriate: allergies, current medications, past family history, past medical history, past social history, past surgical history and problem list     Review of Systems   Constitutional: Negative for chills and fatigue  HENT: Negative for congestion, ear pain, hearing loss, postnasal drip, sinus pressure, sore throat and voice change  Eyes: Negative for pain, discharge and visual disturbance  Respiratory: Negative for cough, chest tightness and shortness of breath  Cardiovascular: Negative for chest pain, palpitations and leg swelling  Gastrointestinal: Negative for abdominal pain, blood in stool, diarrhea, nausea and rectal pain  Genitourinary: Negative for difficulty urinating, dysuria and urgency  Musculoskeletal: Negative for arthralgias and joint swelling     Skin: Negative for rash  Discomfort on the skin of the left breast medially   Allergic/Immunologic: Negative for environmental allergies and food allergies  Neurological: Negative for dizziness, tremors, weakness, numbness and headaches  Hematological: Negative for adenopathy  Psychiatric/Behavioral: Negative for behavioral problems and hallucinations  History reviewed  No pertinent past medical history  Current Outpatient Medications:     TURMERIC PO, Take by mouth, Disp: , Rfl:     No Known Allergies    Social History   Past Surgical History:   Procedure Laterality Date    COSMETIC SURGERY Bilateral     Breast implants     Family History   Problem Relation Age of Onset    No Known Problems Mother     Hypertension Father        Objective:  /80 (BP Location: Left arm, Patient Position: Sitting, Cuff Size: Adult)   Pulse 65   Temp 97 9 °F (36 6 °C) (Temporal)   Ht 5' 3 58" (1 615 m)   Wt 71 kg (156 lb 8 oz)   LMP 04/02/2021 (Approximate)   SpO2 99%   BMI 27 22 kg/m²        Physical Exam  Vitals and nursing note reviewed  Constitutional:       Appearance: She is well-developed  HENT:      Head: Normocephalic  Eyes:      Pupils: Pupils are equal, round, and reactive to light  Cardiovascular:      Heart sounds: No murmur heard  Pulmonary:      Breath sounds: Normal breath sounds  Abdominal:      General: Bowel sounds are normal    Musculoskeletal:         General: No tenderness  Cervical back: Normal range of motion  Skin:     General: Skin is warm  Neurological:      Mental Status: She is alert and oriented to person, place, and time

## 2021-06-28 ENCOUNTER — TELEPHONE (OUTPATIENT)
Dept: INTERNAL MEDICINE CLINIC | Age: 46
End: 2021-06-28

## 2021-06-28 ENCOUNTER — HOSPITAL ENCOUNTER (OUTPATIENT)
Dept: MAMMOGRAPHY | Facility: MEDICAL CENTER | Age: 46
Discharge: HOME | End: 2021-06-28
Payer: COMMERCIAL

## 2021-06-28 VITALS — HEIGHT: 64 IN | WEIGHT: 156 LBS | BODY MASS INDEX: 26.63 KG/M2

## 2021-06-28 DIAGNOSIS — Z12.31 ENCOUNTER FOR SCREENING MAMMOGRAM FOR MALIGNANT NEOPLASM OF BREAST: ICD-10-CM

## 2021-06-28 PROCEDURE — 77067 SCR MAMMO BI INCL CAD: CPT

## 2021-06-28 PROCEDURE — 77063 BREAST TOMOSYNTHESIS BI: CPT

## 2021-06-28 NOTE — TELEPHONE ENCOUNTER
Patient called and stated that she is having surgery and needs a covid test placed in her chart  Surgery is on 6/30/2021

## 2021-07-06 ENCOUNTER — TELEPHONE (OUTPATIENT)
Dept: OBGYN CLINIC | Facility: CLINIC | Age: 46
End: 2021-07-06

## 2021-07-06 NOTE — TELEPHONE ENCOUNTER
Can you call the patient and let her know her mammogram was benign  She will have routine screening again in 1 year  Called CHICA moura

## 2021-07-09 ENCOUNTER — TELEPHONE (OUTPATIENT)
Dept: OBGYN CLINIC | Facility: CLINIC | Age: 46
End: 2021-07-09

## 2021-07-09 NOTE — TELEPHONE ENCOUNTER
Called pt, LMOM to call back for results; As per- Clarice Morrow MD   Can you call the patient and let her know her mammogram was benign  She will have routine screening again in 1 year

## 2022-01-20 ENCOUNTER — TELEPHONE (OUTPATIENT)
Dept: INTERNAL MEDICINE CLINIC | Age: 47
End: 2022-01-20

## 2022-04-21 ENCOUNTER — OFFICE VISIT (OUTPATIENT)
Dept: INTERNAL MEDICINE CLINIC | Age: 47
End: 2022-04-21
Payer: COMMERCIAL

## 2022-04-21 VITALS
TEMPERATURE: 97.6 F | SYSTOLIC BLOOD PRESSURE: 110 MMHG | HEART RATE: 79 BPM | OXYGEN SATURATION: 98 % | DIASTOLIC BLOOD PRESSURE: 58 MMHG | WEIGHT: 147 LBS | BODY MASS INDEX: 25.1 KG/M2 | HEIGHT: 64 IN

## 2022-04-21 DIAGNOSIS — J30.9 CHRONIC ALLERGIC RHINITIS: ICD-10-CM

## 2022-04-21 DIAGNOSIS — J01.00 ACUTE MAXILLARY SINUSITIS, RECURRENCE NOT SPECIFIED: Primary | ICD-10-CM

## 2022-04-21 DIAGNOSIS — J01.00 ACUTE MAXILLARY SINUSITIS, RECURRENCE NOT SPECIFIED: ICD-10-CM

## 2022-04-21 DIAGNOSIS — Z12.31 ENCOUNTER FOR SCREENING MAMMOGRAM FOR MALIGNANT NEOPLASM OF BREAST: ICD-10-CM

## 2022-04-21 PROCEDURE — 3008F BODY MASS INDEX DOCD: CPT | Performed by: STUDENT IN AN ORGANIZED HEALTH CARE EDUCATION/TRAINING PROGRAM

## 2022-04-21 PROCEDURE — 99213 OFFICE O/P EST LOW 20 MIN: CPT | Performed by: STUDENT IN AN ORGANIZED HEALTH CARE EDUCATION/TRAINING PROGRAM

## 2022-04-21 PROCEDURE — 3725F SCREEN DEPRESSION PERFORMED: CPT | Performed by: STUDENT IN AN ORGANIZED HEALTH CARE EDUCATION/TRAINING PROGRAM

## 2022-04-21 RX ORDER — CEFDINIR 300 MG/1
300 CAPSULE ORAL EVERY 12 HOURS SCHEDULED
Qty: 20 CAPSULE | Refills: 0 | Status: SHIPPED | OUTPATIENT
Start: 2022-04-21 | End: 2022-05-01

## 2022-04-21 RX ORDER — AZITHROMYCIN 250 MG/1
TABLET, FILM COATED ORAL
Qty: 6 TABLET | Refills: 0 | Status: CANCELLED | OUTPATIENT
Start: 2022-04-21 | End: 2022-04-25

## 2022-04-21 RX ORDER — CEFDINIR 300 MG/1
300 CAPSULE ORAL EVERY 12 HOURS SCHEDULED
Qty: 14 CAPSULE | Refills: 0 | Status: SHIPPED | OUTPATIENT
Start: 2022-04-21 | End: 2022-04-21

## 2022-04-21 NOTE — ASSESSMENT & PLAN NOTE
Presented with acute onset of sinus congestion, sinus pressure/pain, runny nose, postnasal drip and sore throat  Has tenderness on the bilateral maxillary and frontal sinuses  Will treat for acute sinusitis with 7 day course of cefdinir  She had no success with amoxicillin in the past   Advised patient to take antibiotics with food  Continue Flonase and Allegra  Patient to call back if symptoms did not improve or worsen

## 2022-04-21 NOTE — PROGRESS NOTES
Assessment/Plan:    Acute maxillary sinusitis  Presented with acute onset of sinus congestion, sinus pressure/pain, runny nose, postnasal drip and sore throat  Has tenderness on the bilateral maxillary and frontal sinuses  Will treat for acute sinusitis with 7 day course of cefdinir  She had no success with amoxicillin in the past   Advised patient to take antibiotics with food  Continue Flonase and Allegra  Patient to call back if symptoms did not improve or worsen  Chronic allergic rhinitis  Continue Flonase and Allegra  Diagnoses and all orders for this visit:    Acute maxillary sinusitis, recurrence not specified  -     cefdinir (OMNICEF) 300 mg capsule; Take 1 capsule (300 mg total) by mouth every 12 (twelve) hours for 7 days    Encounter for screening mammogram for malignant neoplasm of breast  -     Mammo screening bilateral w 3d & cad; Future  -     Ambulatory referral to Gastroenterology; Future    Chronic allergic rhinitis    Other orders  -     Cholecalciferol 10 MCG (400 UNIT) CAPS        Subjective:      Patient ID: Nilda Manley is a 48 y o  female  HPI    Patient with past medical history of seasonal allergy presented today for acute visit for a possible sinus infection  She reports that her symptoms started couple days ago with sinus pressure and congestion  She admits to runny nose, postnasal drip and sore throat  She also reports cough productive of whitish mucus  Patient also reports bilateral ear ache, no hearing impairment or ear discharge  No fevers or chills  She has history of seasonal allergy and previously treated for bacterial sinusitis  She reports failure of treatment with amoxicillin      The following portions of the patient's history were reviewed and updated as appropriate: allergies, current medications, past family history, past medical history, past social history, past surgical history and problem list     Review of Systems   Constitutional: Negative for chills and fever  HENT: Positive for congestion, ear pain, postnasal drip, rhinorrhea, sinus pressure, sinus pain and sore throat  Eyes: Negative for pain and visual disturbance  Respiratory: Positive for cough  Negative for shortness of breath  Cardiovascular: Negative for chest pain and palpitations  Gastrointestinal: Negative for abdominal pain and vomiting  Genitourinary: Negative for dysuria and hematuria  Musculoskeletal: Negative for arthralgias and back pain  Skin: Negative for color change and rash  Neurological: Negative for seizures and syncope  All other systems reviewed and are negative  History reviewed  No pertinent past medical history  Past Surgical History:   Procedure Laterality Date    AUGMENTATION MAMMAPLASTY Bilateral     2011, saline    COSMETIC SURGERY Bilateral     Breast implants       Current Outpatient Medications   Medication Instructions    cefdinir (OMNICEF) 300 mg, Oral, Every 12 hours scheduled    Cholecalciferol 10 MCG (400 UNIT) CAPS No dose, route, or frequency recorded   TURMERIC PO Take by mouth     Objective:      /58 (BP Location: Left arm, Patient Position: Sitting, Cuff Size: Standard)   Pulse 79   Temp 97 6 °F (36 4 °C) (Temporal)   Ht 5' 3 5" (1 613 m)   Wt 66 7 kg (147 lb)   SpO2 98%   BMI 25 63 kg/m²          Physical Exam  Vitals and nursing note reviewed  Constitutional:       General: She is not in acute distress  Appearance: She is well-developed  She is not ill-appearing or toxic-appearing  HENT:      Head: Normocephalic and atraumatic  Comments: Tenderness over the bilateral maxillary sinuses and frontal sinuses     Ears:      Comments: Unable to visualize tympanic membranes due to narrow ear canal and presence of earwax  Mouth/Throat:      Pharynx: Posterior oropharyngeal erythema present  Cardiovascular:      Rate and Rhythm: Normal rate and regular rhythm        Heart sounds: No murmur heard   No friction rub  No gallop  Pulmonary:      Effort: Pulmonary effort is normal  No respiratory distress  Breath sounds: Normal breath sounds  No wheezing or rales  Abdominal:      Palpations: Abdomen is soft  Skin:     General: Skin is warm  Neurological:      General: No focal deficit present  Mental Status: She is alert     Psychiatric:         Mood and Affect: Mood normal          Behavior: Behavior normal

## 2022-06-16 ENCOUNTER — OFFICE VISIT (OUTPATIENT)
Dept: INTERNAL MEDICINE CLINIC | Age: 47
End: 2022-06-16
Payer: COMMERCIAL

## 2022-06-16 VITALS
DIASTOLIC BLOOD PRESSURE: 68 MMHG | OXYGEN SATURATION: 98 % | HEART RATE: 64 BPM | TEMPERATURE: 98.2 F | WEIGHT: 149.9 LBS | SYSTOLIC BLOOD PRESSURE: 104 MMHG | HEIGHT: 64 IN | BODY MASS INDEX: 25.59 KG/M2

## 2022-06-16 DIAGNOSIS — N92.6 IRREGULAR MENSTRUAL BLEEDING: Primary | ICD-10-CM

## 2022-06-16 DIAGNOSIS — Z11.59 NEED FOR HEPATITIS C SCREENING TEST: ICD-10-CM

## 2022-06-16 DIAGNOSIS — Z00.00 ANNUAL PHYSICAL EXAM: ICD-10-CM

## 2022-06-16 DIAGNOSIS — Z11.4 SCREENING FOR HIV (HUMAN IMMUNODEFICIENCY VIRUS): ICD-10-CM

## 2022-06-16 PROCEDURE — 99396 PREV VISIT EST AGE 40-64: CPT | Performed by: INTERNAL MEDICINE

## 2022-06-16 PROCEDURE — 3008F BODY MASS INDEX DOCD: CPT | Performed by: INTERNAL MEDICINE

## 2022-06-16 NOTE — PROGRESS NOTES
ADULT ANNUAL [address] [facility] PRIMARY CARE BATH    NAME: Brandon Jonas  AGE: 48 y o  SEX: female  : 1975     DATE: 2022     Assessment and Plan:     Problem List Items Addressed This Visit    None         Immunizations and preventive care screenings were discussed with patient today  Appropriate education was printed on patient's after visit summary  Counseling:  Alcohol/drug use: discussed moderation in alcohol intake, the recommendations for healthy alcohol use, and avoidance of illicit drug use  Dental Health: discussed importance of regular tooth brushing, flossing, and dental visits  Injury prevention: discussed safety/seat belts, safety helmets, smoke detectors, carbon dioxide detectors, and smoking near bedding or upholstery  Sexual health: discussed sexually transmitted diseases, partner selection, use of condoms, avoidance of unintended pregnancy, and contraceptive alternatives  · Exercise: the importance of regular exercise/physical activity was discussed  Recommend exercise 3-5 times per week for at least 30 minutes  BMI Counseling: Body mass index is 26 14 kg/m²  The BMI is above normal  Nutrition recommendations include decreasing portion sizes, encouraging healthy choices of fruits and vegetables and moderation in carbohydrate intake  Exercise recommendations include moderate physical activity 150 minutes/week  Rationale for BMI follow-up plan is due to patient being overweight or obese  No follow-ups on file  Chief Complaint:     Chief Complaint   Patient presents with    Physical Exam     annual      History of Present Illness:     Adult Annual Physical   Patient here for a comprehensive physical exam  The patient reports Irregular heavy periods  Diet and Physical Activity  · Diet/Nutrition: well balanced diet  · Exercise: walking        Depression Screening  PHQ-2/9 Depression Screening General Health  · Sleep: sleeps well  · Hearing: normal - bilateral   · Vision: wears glasses  · Dental: regular dental visits  /GYN Health  · Patient is: perimenopausal  · Last menstrual period:  Last week  · Contraceptive method: None  Review of Systems:     Review of Systems   Constitutional: Positive for fatigue  Negative for chills  HENT: Negative for congestion, ear pain, hearing loss, postnasal drip, sinus pressure, sore throat and voice change  Eyes: Negative for pain, discharge and visual disturbance  Respiratory: Negative for cough, chest tightness and shortness of breath  Cardiovascular: Negative for chest pain, palpitations and leg swelling  Gastrointestinal: Negative for abdominal pain, blood in stool, diarrhea, nausea and rectal pain  Genitourinary: Positive for vaginal bleeding (Irregular heavy periods)  Negative for difficulty urinating, dysuria and urgency  Musculoskeletal: Negative for arthralgias and joint swelling  Skin: Negative for rash  Allergic/Immunologic: Negative for environmental allergies and food allergies  Neurological: Negative for dizziness, tremors, weakness, numbness and headaches  Hematological: Negative for adenopathy  Psychiatric/Behavioral: Negative for behavioral problems and hallucinations  Past Medical History:     History reviewed  No pertinent past medical history     Past Surgical History:     Past Surgical History:   Procedure Laterality Date    AUGMENTATION MAMMAPLASTY Bilateral     2011, saline    COSMETIC SURGERY Bilateral     Breast implants      Social History:     Social History     Socioeconomic History    Marital status: /Civil Union     Spouse name: None    Number of children: 3    Years of education: None    Highest education level: None   Occupational History    None   Tobacco Use    Smoking status: Never Smoker    Smokeless tobacco: Never Used   Vaping Use    Vaping Use: Never used   Substance and Sexual Activity    Alcohol use: Never    Drug use: Never    Sexual activity: Never   Other Topics Concern    None   Social History Narrative    None     Social Determinants of Health     Financial Resource Strain: Not on file   Food Insecurity: Not on file   Transportation Needs: Not on file   Physical Activity: Not on file   Stress: Not on file   Social Connections: Not on file   Intimate Partner Violence: Not on file   Housing Stability: Not on file      Family History:     Family History   Problem Relation Age of Onset    No Known Problems Mother     Hypertension Father     Breast cancer Sister         52    No Known Problems Daughter     No Known Problems Maternal Grandmother     No Known Problems Maternal Grandfather     No Known Problems Paternal Grandmother     No Known Problems Paternal Grandfather     No Known Problems Daughter     Breast cancer Maternal Aunt     Stomach cancer Maternal Aunt       Current Medications:     Current Outpatient Medications   Medication Sig Dispense Refill    Nutritional Supplements (BALANCED NUTRITIONAL SHAKE PLS PO) Take by mouth in the morning      Cholecalciferol 10 MCG (400 UNIT) CAPS  (Patient not taking: Reported on 6/16/2022)      TURMERIC PO Take by mouth (Patient not taking: No sig reported)       No current facility-administered medications for this visit  Allergies:     No Known Allergies   Physical Exam:     /68 (BP Location: Left arm, Patient Position: Sitting, Cuff Size: Standard)   Pulse 64   Temp 98 2 °F (36 8 °C) (Temporal)   Ht 5' 3 5" (1 613 m)   Wt 68 kg (149 lb 14 4 oz)   SpO2 98%   BMI 26 14 kg/m²     Physical Exam  Vitals and nursing note reviewed  Constitutional:       General: She is not in acute distress  Appearance: She is well-developed  HENT:      Head: Normocephalic and atraumatic     Eyes:      Conjunctiva/sclera: Conjunctivae normal    Cardiovascular:      Rate and Rhythm: Normal rate and regular rhythm  Heart sounds: No murmur heard  Pulmonary:      Effort: Pulmonary effort is normal  No respiratory distress  Breath sounds: Normal breath sounds  Abdominal:      Palpations: Abdomen is soft  Tenderness: There is no abdominal tenderness  Musculoskeletal:      Cervical back: Neck supple  Skin:     General: Skin is warm and dry  Neurological:      Mental Status: She is alert            Maria Teresa Wallace MD  [facility]

## 2022-06-16 NOTE — PATIENT INSTRUCTIONS

## 2022-06-17 ENCOUNTER — APPOINTMENT (OUTPATIENT)
Dept: LAB | Age: 47
End: 2022-06-17
Payer: COMMERCIAL

## 2022-06-17 DIAGNOSIS — N92.6 IRREGULAR MENSTRUAL BLEEDING: Primary | ICD-10-CM

## 2022-06-17 DIAGNOSIS — Z11.59 NEED FOR HEPATITIS C SCREENING TEST: ICD-10-CM

## 2022-06-17 DIAGNOSIS — Z00.00 ANNUAL PHYSICAL EXAM: ICD-10-CM

## 2022-06-17 DIAGNOSIS — Z11.4 SCREENING FOR HIV (HUMAN IMMUNODEFICIENCY VIRUS): ICD-10-CM

## 2022-06-17 LAB
ALBUMIN SERPL BCP-MCNC: 3.5 G/DL (ref 3.5–5)
ALP SERPL-CCNC: 69 U/L (ref 46–116)
ALT SERPL W P-5'-P-CCNC: 25 U/L (ref 12–78)
ANION GAP SERPL CALCULATED.3IONS-SCNC: -1 MMOL/L (ref 4–13)
AST SERPL W P-5'-P-CCNC: 16 U/L (ref 5–45)
BASOPHILS # BLD AUTO: 0.03 THOUSANDS/ΜL (ref 0–0.1)
BASOPHILS NFR BLD AUTO: 1 % (ref 0–1)
BILIRUB SERPL-MCNC: 0.74 MG/DL (ref 0.2–1)
BUN SERPL-MCNC: 17 MG/DL (ref 5–25)
CALCIUM SERPL-MCNC: 9.3 MG/DL (ref 8.3–10.1)
CHLORIDE SERPL-SCNC: 106 MMOL/L (ref 100–108)
CHOLEST SERPL-MCNC: 168 MG/DL
CO2 SERPL-SCNC: 30 MMOL/L (ref 21–32)
CREAT SERPL-MCNC: 0.75 MG/DL (ref 0.6–1.3)
EOSINOPHIL # BLD AUTO: 0.11 THOUSAND/ΜL (ref 0–0.61)
EOSINOPHIL NFR BLD AUTO: 3 % (ref 0–6)
ERYTHROCYTE [DISTWIDTH] IN BLOOD BY AUTOMATED COUNT: 14.2 % (ref 11.6–15.1)
FSH SERPL-ACNC: 6.1 MIU/ML
GFR SERPL CREATININE-BSD FRML MDRD: 94 ML/MIN/1.73SQ M
GLUCOSE P FAST SERPL-MCNC: 94 MG/DL (ref 65–99)
HCT VFR BLD AUTO: 41.1 % (ref 34.8–46.1)
HCV AB SER QL: NORMAL
HDLC SERPL-MCNC: 53 MG/DL
HGB BLD-MCNC: 13.1 G/DL (ref 11.5–15.4)
IMM GRANULOCYTES # BLD AUTO: 0.01 THOUSAND/UL (ref 0–0.2)
IMM GRANULOCYTES NFR BLD AUTO: 0 % (ref 0–2)
LDLC SERPL CALC-MCNC: 103 MG/DL (ref 0–100)
LH SERPL-ACNC: 12.7 MIU/ML
LYMPHOCYTES # BLD AUTO: 1.29 THOUSANDS/ΜL (ref 0.6–4.47)
LYMPHOCYTES NFR BLD AUTO: 36 % (ref 14–44)
MCH RBC QN AUTO: 27.5 PG (ref 26.8–34.3)
MCHC RBC AUTO-ENTMCNC: 31.9 G/DL (ref 31.4–37.4)
MCV RBC AUTO: 86 FL (ref 82–98)
MONOCYTES # BLD AUTO: 0.21 THOUSAND/ΜL (ref 0.17–1.22)
MONOCYTES NFR BLD AUTO: 6 % (ref 4–12)
NEUTROPHILS # BLD AUTO: 1.96 THOUSANDS/ΜL (ref 1.85–7.62)
NEUTS SEG NFR BLD AUTO: 54 % (ref 43–75)
NONHDLC SERPL-MCNC: 115 MG/DL
NRBC BLD AUTO-RTO: 0 /100 WBCS
PLATELET # BLD AUTO: 245 THOUSANDS/UL (ref 149–390)
PMV BLD AUTO: 11 FL (ref 8.9–12.7)
POTASSIUM SERPL-SCNC: 4.3 MMOL/L (ref 3.5–5.3)
PROT SERPL-MCNC: 7.2 G/DL (ref 6.4–8.2)
RBC # BLD AUTO: 4.77 MILLION/UL (ref 3.81–5.12)
SODIUM SERPL-SCNC: 135 MMOL/L (ref 136–145)
TRIGL SERPL-MCNC: 61 MG/DL
TSH SERPL DL<=0.05 MIU/L-ACNC: 1.37 UIU/ML (ref 0.45–4.5)
WBC # BLD AUTO: 3.61 THOUSAND/UL (ref 4.31–10.16)

## 2022-06-17 PROCEDURE — 36415 COLL VENOUS BLD VENIPUNCTURE: CPT

## 2022-06-17 PROCEDURE — 83001 ASSAY OF GONADOTROPIN (FSH): CPT

## 2022-06-17 PROCEDURE — 80053 COMPREHEN METABOLIC PANEL: CPT

## 2022-06-17 PROCEDURE — 84443 ASSAY THYROID STIM HORMONE: CPT

## 2022-06-17 PROCEDURE — 87389 HIV-1 AG W/HIV-1&-2 AB AG IA: CPT

## 2022-06-17 PROCEDURE — 80061 LIPID PANEL: CPT

## 2022-06-17 PROCEDURE — 83002 ASSAY OF GONADOTROPIN (LH): CPT

## 2022-06-17 PROCEDURE — 86803 HEPATITIS C AB TEST: CPT

## 2022-06-17 PROCEDURE — 85025 COMPLETE CBC W/AUTO DIFF WBC: CPT

## 2022-06-19 LAB — HIV 1+2 AB+HIV1 P24 AG SERPL QL IA: NORMAL

## 2022-06-29 ENCOUNTER — HOSPITAL ENCOUNTER (OUTPATIENT)
Dept: MAMMOGRAPHY | Facility: MEDICAL CENTER | Age: 47
Discharge: HOME | End: 2022-06-29
Payer: COMMERCIAL

## 2022-06-29 VITALS — WEIGHT: 149 LBS | BODY MASS INDEX: 25.44 KG/M2 | HEIGHT: 64 IN

## 2022-06-29 DIAGNOSIS — Z12.31 ENCOUNTER FOR SCREENING MAMMOGRAM FOR MALIGNANT NEOPLASM OF BREAST: ICD-10-CM

## 2022-06-29 PROCEDURE — 77063 BREAST TOMOSYNTHESIS BI: CPT

## 2022-06-29 PROCEDURE — 77067 SCR MAMMO BI INCL CAD: CPT

## 2022-07-13 ENCOUNTER — TELEPHONE (OUTPATIENT)
Dept: INTERNAL MEDICINE CLINIC | Facility: OTHER | Age: 47
End: 2022-07-13

## 2022-07-13 NOTE — TELEPHONE ENCOUNTER
Patient would like a call from dr Piedad Tan when he can  Its regarding her tests   Please call [phone]

## 2022-07-14 NOTE — TELEPHONE ENCOUNTER
I tried to call her 1 time the call was busy and the 2nd time it was answering machine just letter no there is no problems with her blood workup all look okay

## 2022-07-20 ENCOUNTER — TELEPHONE (OUTPATIENT)
Dept: INTERNAL MEDICINE CLINIC | Age: 47
End: 2022-07-20

## 2022-07-21 NOTE — TELEPHONE ENCOUNTER
Mammogram and the blood test are good I left a message for her on her answering machine this is the 2nd time I tried to reach her and answering machine is the only way I can communicate

## 2022-09-06 ENCOUNTER — CONSULT (OUTPATIENT)
Dept: VASCULAR SURGERY | Facility: CLINIC | Age: 47
End: 2022-09-06
Payer: COMMERCIAL

## 2022-09-06 VITALS
DIASTOLIC BLOOD PRESSURE: 60 MMHG | WEIGHT: 152.7 LBS | HEART RATE: 67 BPM | SYSTOLIC BLOOD PRESSURE: 100 MMHG | HEIGHT: 64 IN | BODY MASS INDEX: 26.07 KG/M2

## 2022-09-06 DIAGNOSIS — I83.93 SPIDER VEINS OF BOTH LOWER EXTREMITIES: Primary | ICD-10-CM

## 2022-09-06 PROCEDURE — 99203 OFFICE O/P NEW LOW 30 MIN: CPT | Performed by: NURSE PRACTITIONER

## 2022-09-06 NOTE — ASSESSMENT & PLAN NOTE
48year old female with BLE spider veins, prior hx of sclerotherapy at [facility] x2, last session Jan '22  Patient presents to the office to discuss injection sclerotherapy   -Few telangiectasias of the medial and lateral thigh bilaterally   -Main area of concern is left distal medial thigh   -No large truncal varicosities, no symptoms of venous insufficiency   -Cosmetic concerns   -We discussed injection sclerotherapy including pain associated with injection, cost, need to wear compression socks after injections and the risk of a permanent hyperpigmentation   -Rx 20-30 mmHg compression socks given  -Will plan injections, next available  Asclera 05% solution, foam technique, 32g needle, 90 minutes, full sessin, $500  -Photos taken this visit, reviewed   Will need additional photo next visit right lateral/posterior thigh telangiectasia

## 2022-09-06 NOTE — PROGRESS NOTES
Assessment/Plan:    Spider veins of both lower extremities  48year old female with BLE spider veins, prior hx of sclerotherapy at [facility] x2, last session Jan '22  Patient presents to the office to discuss injection sclerotherapy   -Few telangiectasias of the medial and lateral thigh bilaterally   -Main area of concern is left distal medial thigh   -No large truncal varicosities, no symptoms of venous insufficiency   -Cosmetic concerns   -We discussed injection sclerotherapy including pain associated with injection, cost, need to wear compression socks after injections and the risk of a permanent hyperpigmentation   -Rx 20-30 mmHg compression socks given  -Will plan injections, next available  Asclera 05% solution, foam technique, 32g needle, 90 minutes, full sessin, $500  -Photos taken this visit, reviewed  Will need additional photo next visit right lateral/posterior thigh telangiectasia        Diagnoses and all orders for this visit:    Spider veins of both lower extremities  -     Compression Stocking          Subjective:      Patient ID: Claude Schatz is a 48 y o  female  Patient is new to our practice for Spider Veins on BLE  Patient c/o cosmetic concerns  No pain, burning or itching  Patient does not wears compression  HPI  48year old female with BLE spider veins, prior hx of sclerotherapy at [facility] x2, last session Jan '22  Patient presents to the office to discuss injection sclerotherapy  Patient previously had injection sclerotherapy at [facility] and 5-6 years ago with good response  She most recently had a 2nd set of injections in January 2022 with no significant improvement  Therefore seeks a second opinion  She notes worsening at the left distal medial thigh vein after injections  She has cosmetic concerns  No large truncal varicosities  No significant symptoms of venous insufficiency  No venous stasis changes  No hyperpigmentation    She is mainly concerned about the telangiectasia at the left distal medial thigh, bilateral thigh and has a larger cluster on the right lateral thigh     The following portions of the patient's history were reviewed and updated as appropriate: allergies, current medications, past family history, past medical history, past social history, past surgical history and problem list   ROS reviewed     Review of Systems   Constitutional: Negative  HENT: Negative  Eyes: Negative  Respiratory: Negative  Cardiovascular: Negative  Gastrointestinal: Negative  Endocrine: Negative  Genitourinary: Negative  Musculoskeletal: Negative  Skin: Negative  Allergic/Immunologic: Negative  Neurological: Negative  Hematological: Negative  Psychiatric/Behavioral: Negative  Objective:      /60 (BP Location: Right arm, Patient Position: Sitting, Cuff Size: Adult)   Pulse 67   Ht 5' 3 5" (1 613 m)   Wt 69 3 kg (152 lb 11 2 oz)   BMI 26 63 kg/m²          Physical Exam  Vitals and nursing note reviewed  Constitutional:       Appearance: Normal appearance  HENT:      Head: Normocephalic and atraumatic  Eyes:      Extraocular Movements: Extraocular movements intact  Cardiovascular:      Pulses: Normal pulses  Heart sounds: Normal heart sounds  Pulmonary:      Effort: Pulmonary effort is normal    Musculoskeletal:         General: No swelling  Normal range of motion  Comments: No large truncal varicosities  Few scattered telangiectasias, focal area at the left distal medial thigh and right lateral thigh   Skin:     General: Skin is warm  Neurological:      General: No focal deficit present  Mental Status: She is alert and oriented to person, place, and time     Psychiatric:         Mood and Affect: Mood normal          Behavior: Behavior normal

## 2022-10-07 ENCOUNTER — OFFICE VISIT (OUTPATIENT)
Dept: GYNECOLOGY | Facility: CLINIC | Age: 47
End: 2022-10-07
Payer: COMMERCIAL

## 2022-10-07 VITALS
WEIGHT: 151.6 LBS | BODY MASS INDEX: 25.88 KG/M2 | HEIGHT: 64 IN | DIASTOLIC BLOOD PRESSURE: 66 MMHG | SYSTOLIC BLOOD PRESSURE: 114 MMHG

## 2022-10-07 DIAGNOSIS — N92.1 MENOMETRORRHAGIA: Primary | ICD-10-CM

## 2022-10-07 DIAGNOSIS — Z12.11 COLON CANCER SCREENING: ICD-10-CM

## 2022-10-07 PROCEDURE — 99214 OFFICE O/P EST MOD 30 MIN: CPT | Performed by: OBSTETRICS & GYNECOLOGY

## 2022-10-07 PROCEDURE — G0145 SCR C/V CYTO,THINLAYER,RESCR: HCPCS | Performed by: OBSTETRICS & GYNECOLOGY

## 2022-10-07 NOTE — PROGRESS NOTES
Assessment/Plan   Diagnoses and all orders for this visit:    Menometrorrhagia  -     CBC; Future  -     hCG, quantitative; Future    Colon cancer screening  -     Ambulatory referral to Gastroenterology; Future    1  Menometrorrhagia-notes 1 year history of heavy/irregular bleeding  She can bleed with changing pad at 0100 hours intervals, with bleeding on to clothes and sheets  She has had intervals where she will have menstrual cycle with resolution and then recurrence in 1 week time  She has had 3 episodes where she bled for almost a month continuously over the past year  She did have recent laboratory testing including hemoglobin 13 1, normal TSH, FSH 6 1 and LH 12 7  This was from June 2022  Given this bleeding, would recommend evaluation  Pap smear was done with reflex HPV  Repeat CBC and HCG was ordered  To return near future for sonohysterogram with endometrial biopsy  She was counseled in detail about this testing  She was encouraged to take ibuprofen or naproxen prior to the procedure  We can then proceed accordingly after this  2  Contraception-uses condoms  3  History possible fibroid-was told in the past that she might have fibroids  Last ultrasound I could see was from 2016 during miscarriage, no myomas were noted  Uterus was retroverted, mildly tender and slightly enlarged about 6 week size  We will assess at upcoming ultrasound  4  Other-last seen in gyn Clinic May 2021, exam was normal at that time  Follow-up near future for sonohysterogram with endometrial biopsy  Subjective   Patient ID: Joann Caceres is a 48 y o  female  Vitals:    10/07/22 0828   BP: 114/66     Patient was seen today for new patient evaluation  Please see assessment plan for details        The following portions of the patient's history were reviewed and updated as appropriate: allergies, current medications, past family history, past medical history, past social history, past surgical history and problem list   History reviewed  No pertinent past medical history    Past Surgical History:   Procedure Laterality Date    AUGMENTATION MAMMAPLASTY Bilateral     , saline    AUGMENTATION MAMMAPLASTY Bilateral     implants redone 21    COSMETIC SURGERY Bilateral     Breast implants     OB History    Para Term  AB Living   4 3 3         SAB IAB Ectopic Multiple Live Births                  # Outcome Date GA Lbr Mina/2nd Weight Sex Delivery Anes PTL Lv   4             3 Term            2 Term            1 Term                Current Outpatient Medications:     Cholecalciferol 10 MCG (400 UNIT) CAPS, , Disp: , Rfl:     Nutritional Supplements (BALANCED NUTRITIONAL SHAKE PLS PO), Take by mouth in the morning, Disp: , Rfl:     TURMERIC PO, Take by mouth, Disp: , Rfl:   No Known Allergies  Social History     Socioeconomic History    Marital status: /Civil Union     Spouse name: None    Number of children: 3    Years of education: None    Highest education level: None   Occupational History    None   Tobacco Use    Smoking status: Never Smoker    Smokeless tobacco: Never Used   Vaping Use    Vaping Use: Never used   Substance and Sexual Activity    Alcohol use: Never    Drug use: Never    Sexual activity: Yes     Partners: Male   Other Topics Concern    None   Social History Narrative    None     Social Determinants of Health     Financial Resource Strain: Not on file   Food Insecurity: Not on file   Transportation Needs: Not on file   Physical Activity: Not on file   Stress: Not on file   Social Connections: Not on file   Intimate Partner Violence: Not on file   Housing Stability: Not on file     Family History   Problem Relation Age of Onset    No Known Problems Mother     Hypertension Father     Breast cancer Sister         52    No Known Problems Daughter     No Known Problems Maternal Grandmother     No Known Problems Maternal Grandfather     No Known Problems Paternal Grandmother     No Known Problems Paternal Grandfather     No Known Problems Daughter     Breast cancer Maternal Aunt     Stomach cancer Maternal Aunt        Review of Systems   Constitutional: Negative for chills, diaphoresis, fatigue and fever  Respiratory: Negative for apnea, cough, chest tightness, shortness of breath and wheezing  Cardiovascular: Negative for chest pain, palpitations and leg swelling  Gastrointestinal: Negative for abdominal distention, abdominal pain, anal bleeding, constipation, diarrhea, nausea, rectal pain and vomiting  Genitourinary: Negative for difficulty urinating, dyspareunia, dysuria, frequency, hematuria, menstrual problem, pelvic pain, urgency, vaginal bleeding, vaginal discharge and vaginal pain  Musculoskeletal: Negative for arthralgias, back pain and myalgias  Skin: Negative for color change and rash  Neurological: Negative for dizziness, syncope, light-headedness, numbness and headaches  Hematological: Negative for adenopathy  Does not bruise/bleed easily  Psychiatric/Behavioral: Negative for dysphoric mood and sleep disturbance  The patient is not nervous/anxious  Objective   Physical Exam  OBGyn Exam     Objective      /66 (BP Location: Left arm, Patient Position: Sitting)   Ht 5' 3 5" (1 613 m)   Wt 68 8 kg (151 lb 9 6 oz)   LMP 09/02/2022   BMI 26 43 kg/m²     General:   alert and oriented, in no acute distress   Neck:    Breast:    Heart:    Lungs:    Abdomen: soft, non-tender, without masses or organomegaly   Vulva: normal   Vagina: Without erythema or lesions or discharge  Normal   Cervix: Without lesions or discharge or cervicitis    No Cervical motion tenderness   Uterus: retroverted, size consistent with Six weeks, Mildly tender   Adnexa: no mass, fullness, tenderness   Rectum: negative    Psych:  Normal mood and affect   Skin:  Without obvious lesions   Eyes: symmetric, with normal movements and reactivity   Musculoskeletal:  Normal muscle tone and movements appreciated

## 2022-10-11 PROBLEM — J01.00 ACUTE MAXILLARY SINUSITIS: Status: RESOLVED | Noted: 2022-04-21 | Resolved: 2022-10-11

## 2022-10-17 LAB
LAB AP GYN PRIMARY INTERPRETATION: NORMAL
LAB AP LMP: NORMAL
Lab: NORMAL

## 2022-10-28 ENCOUNTER — PROCEDURE VISIT (OUTPATIENT)
Dept: GYNECOLOGY | Facility: CLINIC | Age: 47
End: 2022-10-28

## 2022-10-28 ENCOUNTER — ULTRASOUND (OUTPATIENT)
Dept: GYNECOLOGY | Facility: CLINIC | Age: 47
End: 2022-10-28

## 2022-10-28 VITALS
DIASTOLIC BLOOD PRESSURE: 64 MMHG | BODY MASS INDEX: 25.78 KG/M2 | WEIGHT: 151 LBS | HEIGHT: 64 IN | HEART RATE: 54 BPM | SYSTOLIC BLOOD PRESSURE: 110 MMHG

## 2022-10-28 DIAGNOSIS — N80.03 ADENOMYOSIS: ICD-10-CM

## 2022-10-28 DIAGNOSIS — N84.0 ENDOMETRIAL POLYP: ICD-10-CM

## 2022-10-28 DIAGNOSIS — N92.1 MENOMETRORRHAGIA: Primary | ICD-10-CM

## 2022-10-28 DIAGNOSIS — N83.202 LEFT OVARIAN CYST: ICD-10-CM

## 2022-10-28 LAB — SL AMB POCT URINE HCG: NORMAL

## 2022-10-28 NOTE — PROGRESS NOTES
AMB US Pelvic Non OB    Date/Time: 10/28/2022 6:49 AM  Performed by: Gilbert Jordan  Authorized by: Saima Mcclellan MD   Universal Protocol:  Patient identity confirmed: verbally with patient      Uterine findings:     Length (cm): 10 75    Height (cm):  6 39    Width (cm):  8 21    Endometrial stripe: identified      Endometrium thickness (mm):  16 8  Left ovary findings:     Left ovary:  Visualized    Length (cm): 5 27    Height (cm): 3 26    Width (cm): 3 33  Right ovary findings:     Right ovary:  Visualized    Length (cm): 3 9    Height (cm): 2 41    Width (cm): 2 75  Other findings:     Free pelvic fluid: not identified      Free peritoneal fluid: not identified    Post-Procedure Details:     Impression:  Retroverted uterus is inhomogeneous throughout without fibroids  Low level echogenicities are present throughout the myometrium suggestive of adenomyosis  The right ovary appears within normal limits  The left ovary demonstrates a 4 2cm complex cystic mass with septations  No free fluid  Nabothian cysts are noted in the cerivx  Tolerance: Tolerated well, no immediate complications    Complications: no complications    Additional Procedure Comments:      Fullscreen F8 E8C-RS transvaginal transducer Serial # [id number] was used to perform the examination today and subsequently followed with high level disinfection utilizing Trophon EPR procedure  Ultrasound performed at:     [address] [address]  Phone:  [phone]  Fax:  [phone]  Sonohysterogram    Date/Time: 10/28/2022 6:49 AM  Performed by: Saima Mcclellan MD  Authorized by: Saima Mcclellan MD   Universal Protocol:  Patient identity confirmed: verbally with patient      Pre-procedure:     Prepped with: Hibiclens    Procedure:     Cervix cleaned and prepped: yes      Uterus sounded: yes      Catheter inserted: yes      Uterine cavity distended with saline: yes    Post-procedure:     Patient observed: yes Post procedure instructions given to patient: yes      Patient tolerated procedure well with no complications: yes    Comments:      Sonohysterogram demonstrates an endometrial polyp    Endometrial biopsy    Date/Time: 10/28/2022 6:50 AM  Performed by: Adele Zuniga MD  Authorized by: Adele Zuniga MD   Universal Protocol:  Patient identity confirmed: verbally with patient      Procedure:     Procedure: endometrial biopsy with Pipelle      A bivalve speculum was placed in the vagina: yes      Cervix cleaned and prepped: yes      Specimen collected: specimen collected and sent to pathology      Patient tolerated procedure well with no complications: yes

## 2022-10-28 NOTE — PATIENT INSTRUCTIONS
No outpatient medications have been marked as taking for the 10/28/22 encounter (Procedure visit) with Adele Zuniga MD

## 2022-10-28 NOTE — PROGRESS NOTES
Assessment/Plan   Diagnoses and all orders for this visit:    Menometrorrhagia    Left ovarian cyst    Adenomyosis    Endometrial polyp    1  Menometrorrhagia with endometrial polyp-patient with 1 year history of heavy/irregular bleeding  She did have laboratory studies with hemoglobin 13 1, normal TSH, FSH of 6 1 and LH 12 7  She never did have follow-up CBC with HCG  Urine pregnancy test today was negative  Sonohysterogram demonstrated polyp on the anterior wall  Endometrial biopsy was done we will inform the patient of the findings  Counseled about hysteroscopy, D&C, endometrial polypectomy  Risks and benefits of the procedure were discussed and all questions were answered  Consent was signed, the patient will be scheduled for the surgery in the near future  2  Likely adenomyosis-noted on ultrasound today  Patient was counseled in detail about this  She does note some back pain with her menses, but denies severe cramping  We did discuss treatment options in some detail including hormonal medications and even hysterectomy  She will defer this at this time, await to see response from [facility]   3  Left ovarian cyst-4 2 cm complex cyst noted left ovary with some septations  Patient was counseled about this  Did discuss concomitant laparoscopy with removal of tubes/cyst/possibly ovary with the D&C  She would like to defer this at this time  We will follow on ultrasound in around 6-8 weeks time, at the time of follow-up visit  Patient is aware that if the cyst persists or becomes larger or symptomatic, she may require another surgery at a later date  4  Contraception-condoms  5  History of possible fibroids-none noted on ultrasound today  To follow-up 2 weeks postprocedure for follow-up visit and ultrasound  Subjective   Patient ID: Neris Veliz is a 50 y o  female      Vitals:    10/28/22 0727   BP: 110/64   Pulse: (!) 54     Patient seen today for sonohysterogram with endometrial biopsy with me and office visit  Please see assessment plan and procedure note for details      The following portions of the patient's history were reviewed and updated as appropriate: allergies, current medications, past family history, past medical history, past social history, past surgical history and problem list   No past medical history on file    Past Surgical History:   Procedure Laterality Date   • AUGMENTATION MAMMAPLASTY Bilateral     , saline   • AUGMENTATION MAMMAPLASTY Bilateral     implants redone 21   • COSMETIC SURGERY Bilateral     Breast implants     OB History    Para Term  AB Living   4 3 3   1 3   SAB IAB Ectopic Multiple Live Births   1       3      # Outcome Date GA Lbr Mina/2nd Weight Sex Delivery Anes PTL Lv   4 SAB 2017           3 Term 2012     Vag-Spont   VENKAT   2 Term      Vag-Spont   VENKAT   1 Term      Vag-Spont   VENKAT       Current Outpatient Medications:   •  Cholecalciferol 10 MCG (400 UNIT) CAPS, , Disp: , Rfl:   •  Nutritional Supplements (BALANCED NUTRITIONAL SHAKE PLS PO), Take by mouth in the morning, Disp: , Rfl:   •  TURMERIC PO, Take by mouth, Disp: , Rfl:   No Known Allergies  Social History     Socioeconomic History   • Marital status: /Civil Union     Spouse name: None   • Number of children: 3   • Years of education: None   • Highest education level: None   Occupational History   • None   Tobacco Use   • Smoking status: Never Smoker   • Smokeless tobacco: Never Used   Vaping Use   • Vaping Use: Never used   Substance and Sexual Activity   • Alcohol use: Never   • Drug use: Never   • Sexual activity: Yes     Partners: Male   Other Topics Concern   • None   Social History Narrative   • None     Social Determinants of Health     Financial Resource Strain: Not on file   Food Insecurity: Not on file   Transportation Needs: Not on file   Physical Activity: Not on file   Stress: Not on file   Social Connections: Not on file   Intimate Partner Violence: Not on file   Housing Stability: Not on file     Family History   Problem Relation Age of Onset   • No Known Problems Mother    • Hypertension Father    • Breast cancer Sister         52   • No Known Problems Daughter    • No Known Problems Maternal Grandmother    • No Known Problems Maternal Grandfather    • No Known Problems Paternal Grandmother    • No Known Problems Paternal Grandfather    • No Known Problems Daughter    • Breast cancer Maternal Aunt    • Stomach cancer Maternal Aunt        Review of Systems   Constitutional: Negative for chills, diaphoresis, fatigue and fever  Respiratory: Negative for apnea, cough, chest tightness, shortness of breath and wheezing  Cardiovascular: Negative for chest pain, palpitations and leg swelling  Gastrointestinal: Negative for abdominal distention, abdominal pain, anal bleeding, constipation, diarrhea, nausea, rectal pain and vomiting  Genitourinary: Positive for menstrual problem  Negative for difficulty urinating, dyspareunia, dysuria, frequency, hematuria, pelvic pain, urgency, vaginal bleeding, vaginal discharge and vaginal pain  Musculoskeletal: Negative for arthralgias, back pain and myalgias  Skin: Negative for color change and rash  Neurological: Negative for dizziness, syncope, light-headedness, numbness and headaches  Hematological: Negative for adenopathy  Does not bruise/bleed easily  Psychiatric/Behavioral: Negative for dysphoric mood and sleep disturbance  The patient is not nervous/anxious          Objective   Physical Exam  OBGyn Exam     Objective      /64 (BP Location: Right arm, Patient Position: Supine, Cuff Size: Standard)   Pulse (!) 54   Ht 5' 3 5" (1 613 m)   Wt 68 5 kg (151 lb)   LMP 10/13/2022 (Exact Date)   BMI 26 33 kg/m²     General:   alert and oriented, in no acute distress   Neck:    Breast:    Heart: Regular rate and rhythm   Lungs: clear to auscultation bilaterally   Abdomen: soft, non-tender, without masses or organomegaly   Vulva: normal   Vagina: Without erythema or lesions or discharge  Normal   Cervix: Without lesions or discharge or cervicitis  No Cervical motion tenderness   Uterus: top normal size, retroverted, non-tender   Adnexa: Slight fullness to the left  No tenderness appreciated    Right is negative   Rectum: deferred    Psych:  Normal mood and affect   Skin:  Without obvious lesions   Eyes: symmetric, with normal movements and reactivity   Musculoskeletal:  Normal muscle tone and movements appreciated

## 2022-10-28 NOTE — Clinical Note
Viviana Cordero, Patient for hysteroscopy D&C with polypectomy  Possible Symphion   Thanks, Don Sorenson MD

## 2022-10-31 NOTE — PROGRESS NOTES
Assessment/Plan:    No problem-specific Assessment & Plan notes found for this encounter  {Assess/PlanSmartLinks:17509}      Subjective:      Patient ID: Dania Cullen is a 50 y o  female  HPI    {Common ambulatory SmartLinks:24324}    Review of Systems   Constitutional: Negative  HENT: Negative  Eyes: Negative  Respiratory: Negative  Cardiovascular: Negative  Gastrointestinal: Negative  Endocrine: Negative  Genitourinary: Negative  Musculoskeletal: Negative  Skin: Negative  Allergic/Immunologic: Negative  Neurological: Negative  Hematological: Negative  Psychiatric/Behavioral: Negative            Objective:      LMP 10/13/2022 (Exact Date)          Physical Exam

## 2022-10-31 NOTE — PROGRESS NOTES
SCLEROTHERAPY PROCEDURE NOTE -  VASCULAR    Assessment/Plan:    Spider veins of both lower extremities  50year old female with BLE spider veins, prior hx of sclerotherapy at [facility] x2, last session Jan '22  Residual spider veins and returns to the office for additional sclero injections   -Few telangiectasias of the medial and lateral thigh bilaterally with main area of concern is left distal medial thigh   -No large truncal varicosities, no symptoms of venous insufficiency   -Treated today with injection sclerotherapy  Asclera 0 5% solutions, 3ml   -Compression stockings for 48 hours continuously and then daily for the next 4 weeks  -Follow up in 4-6 weeks to recheck injection sites          Diagnoses and all orders for this visit:    Spider veins of both lower extremities          Subjective: Patient present to do Asclera injection therapy  Patient complains of pain and burning on spider veins  Patient wears compression stockings  Patient ID: Nathan Gonzalez is a 50 y o  female  Indications for Procedure: Patient presents with bilateral lateral thigh telangiectasias on the legs  Exam: On bilateral lower extremities, dilated reticular veins and telangiectasias are present symmetrically without findings of chronic venous insufficiency  Superficial prominent veins  Patient advised or risks of pain upon injection, redness and swelling after treatment, bruising and bleeding, necrosis/ulceration, allergy, discoloration and the likelihood that multiple treatments may be necessary and clearance may not be complete  Affected areas on the bilateral lateral thigh and right distal medial thigh were treated with intravascular injections of 3 cc of  0 5% Asclera using a 32G syringe per ’s protocol  The patient tolerated the procedure well with minimal erythema and edema  Immediate pressure was applied with cotton balls secured with tape at the injection sites   Compression stockings of 20-30mm Hg were applied to the treated legs  The patient was advised to wear compression stockings during the day for the next 4 weeks      Follow up in 4-6 weeks

## 2022-11-01 ENCOUNTER — CLINICAL SUPPORT (OUTPATIENT)
Dept: VASCULAR SURGERY | Facility: CLINIC | Age: 47
End: 2022-11-01

## 2022-11-01 ENCOUNTER — TELEPHONE (OUTPATIENT)
Dept: GYNECOLOGY | Facility: CLINIC | Age: 47
End: 2022-11-01

## 2022-11-01 VITALS
HEIGHT: 64 IN | DIASTOLIC BLOOD PRESSURE: 70 MMHG | WEIGHT: 155.4 LBS | SYSTOLIC BLOOD PRESSURE: 100 MMHG | HEART RATE: 87 BPM | BODY MASS INDEX: 26.53 KG/M2

## 2022-11-01 DIAGNOSIS — I83.93 SPIDER VEINS OF BOTH LOWER EXTREMITIES: Primary | ICD-10-CM

## 2022-11-01 NOTE — TELEPHONE ENCOUNTER
LM for patient to COB to go over surgery dates for surgery with Dr Iker Orozco at [facility]  Patient was seen in office on 10/28/22 and signed consent form

## 2022-11-01 NOTE — PATIENT INSTRUCTIONS
Leave compression socks on for the next 48 hours  After the first 48 hours stockings and dressing can be removed and you can shower   Wear compression daily morning until night until I see you back in the office   Injected areas may appear darker and then lighten of the course of several weeks

## 2022-11-01 NOTE — ASSESSMENT & PLAN NOTE
50year old female with BLE spider veins, prior hx of sclerotherapy at [facility] x2, last session Jan '22  Residual spider veins and returns to the office for additional sclero injections   -Few telangiectasias of the medial and lateral thigh bilaterally with main area of concern is left distal medial thigh   -No large truncal varicosities, no symptoms of venous insufficiency   -Treated today with injection sclerotherapy   Asclera 0 5% solutions, 3ml   -Compression stockings for 48 hours continuously and then daily for the next 4 weeks  -Follow up in 4-6 weeks to recheck injection sites

## 2022-11-11 ENCOUNTER — TELEPHONE (OUTPATIENT)
Dept: GYNECOLOGY | Facility: CLINIC | Age: 47
End: 2022-11-11

## 2022-11-11 NOTE — TELEPHONE ENCOUNTER
I spoke to [facility] on 11/11/22 at 12:04 pm no prior authorization is needed for CPT code 71769,08110 being performed on 12/13/20225      REF # [id number]

## 2022-12-02 ENCOUNTER — APPOINTMENT (OUTPATIENT)
Dept: LAB | Facility: CLINIC | Age: 47
End: 2022-12-02

## 2022-12-02 DIAGNOSIS — Z01.818 PRE-OP TESTING: ICD-10-CM

## 2022-12-02 LAB
ANION GAP SERPL CALCULATED.3IONS-SCNC: 3 MMOL/L (ref 4–13)
BUN SERPL-MCNC: 13 MG/DL (ref 5–25)
CALCIUM SERPL-MCNC: 9.6 MG/DL (ref 8.3–10.1)
CHLORIDE SERPL-SCNC: 106 MMOL/L (ref 96–108)
CO2 SERPL-SCNC: 28 MMOL/L (ref 21–32)
CREAT SERPL-MCNC: 0.68 MG/DL (ref 0.6–1.3)
ERYTHROCYTE [DISTWIDTH] IN BLOOD BY AUTOMATED COUNT: 13.2 % (ref 11.6–15.1)
GFR SERPL CREATININE-BSD FRML MDRD: 103 ML/MIN/1.73SQ M
GLUCOSE P FAST SERPL-MCNC: 99 MG/DL (ref 65–99)
HCT VFR BLD AUTO: 36.1 % (ref 34.8–46.1)
HGB BLD-MCNC: 11.1 G/DL (ref 11.5–15.4)
MCH RBC QN AUTO: 26 PG (ref 26.8–34.3)
MCHC RBC AUTO-ENTMCNC: 30.7 G/DL (ref 31.4–37.4)
MCV RBC AUTO: 85 FL (ref 82–98)
PLATELET # BLD AUTO: 279 THOUSANDS/UL (ref 149–390)
PMV BLD AUTO: 11 FL (ref 8.9–12.7)
POTASSIUM SERPL-SCNC: 4.3 MMOL/L (ref 3.5–5.3)
RBC # BLD AUTO: 4.27 MILLION/UL (ref 3.81–5.12)
SODIUM SERPL-SCNC: 137 MMOL/L (ref 135–147)
WBC # BLD AUTO: 4.35 THOUSAND/UL (ref 4.31–10.16)

## 2022-12-07 NOTE — PRE-PROCEDURE INSTRUCTIONS
Pre-Surgery Instructions:   Medication Instructions   • MAGNESIUM SULFATE PO Pt is already holding this meidcation   • Nutritional Supplements (BALANCED NUTRITIONAL SHAKE PLS PO) Hold day of surgery     • TURMERIC PO   Pt is already holding this meidcation    My Surgical Experience    The following information was developed to assist you to prepare for your operation  What do I need to do before coming to the hospital?  • Arrange for a responsible person to drive you to and from the hospital   • Arrange care for your children at home  Children are not allowed in the recovery areas of the hospital  • Plan to wear clothing that is easy to put on and take off  If you are having shoulder surgery, wear a shirt that buttons or zippers in the front  Bathing  o Shower the evening before and the morning of your surgery with an antibacterial soap  Please refer to the Pre Op Showering Instructions for Surgery Patients Sheet   o Remove nail polish and all body piercing jewelry  o Do not shave any body part for at least 24 hours before surgery-this includes face, arms, legs and upper body  Food  o Nothing to eat or drink after midnight the night before your surgery  This includes candy and chewing gum  o Exception: If your surgery is after 12:00pm (noon), you may have clear liquids such as 7-Up®, ginger ale, apple or cranberry juice, Jell-O®, water, or clear broth until 8:00 am  o Do not drink milk or juice with pulp on the morning before surgery  o Do not drink alcohol 24 hours before surgery  Medicine  o Follow instructions you received from your surgeon about which medicines you may take on the day of surgery  o If instructed to take medicine on the morning of surgery, take pills with just a small sip of water   Call your prescribing doctor for specific infroamtion on what to do if you take insulin    What should I bring to the hospital?    Bring:  • Crutches or a walker, if you have them, for foot or knee surgery  • A list of the daily medicines, vitamins, minerals, herbals and nutritional supplements you take  Include the dosages of medicines and the time you take them each day  • Glasses, dentures or hearing aids  • Minimal clothing; you will be wearing hospital sleepwear  • Photo ID; required to verify your identity  • If you have a Living Will or Power of , bring a copy of the documents  • If you have an ostomy, bring an extra pouch and any supplies you use    Do not bring  • Medicines or inhalers  • Money, valuables or jewelry    What other information should I know about the day of surgery? • Notify your surgeons if you develop a cold, sore throat, cough, fever, rash or any other illness  • Report to the Ambulatory Surgical/Same Day Surgery Unit  • You will be instructed to stop at Registration only if you have not been pre-registered  • Inform your  fi they do not stay that they will be asked by the staff to leave a phone number where they can be reached  • Be available to be reached before surgery  In the event the operating room schedule changes, you may be asked to come in earlier or later than expected    *It is important to tell your doctor and others involved in your health care if you are taking or have been taking any non-prescription drugs, vitamins, minerals, herbals or other nutritional supplements   Any of these may interact with some food or medicines and cause a reaction

## 2022-12-09 NOTE — H&P
Assessment/Plan   Bobbi Walker is a 64-year-old woman with menometrorrhagia and endometrial polyp who presents for hysteroscopy with D&C with polypectomy  Subjective   Patient ID: Ted Porras is a 50 y o  female  There were no vitals filed for this visit  HPI   Bobbi Walker is a 64-year-old who presented with 1 year history of heavy/irregular bleeding  She underwent evaluation including hemoglobin 11 1, normal TSH, FSH 6 1, LH 12 7  Sonohysterogram demonstrated endometrial polyp  Endometrial biopsy 2022 with limited portions of benign inactive endometrium with benign metaplastic changes, negative for atypia or malignancy  Patient was counseled in detail about the findings  She will proceed with hysteroscopy with D&C with endometrial polypectomy  Risks and benefits were discussed at time of office visit and consent was signed at that time  She presents today for this surgery  Notably, she had adenomyosis appreciated on ultrasound  4 2 cm left ovary complex cyst with some septations was noted on 10/28/2022 visit  To have follow-up ultrasound  Patient declined concomitant laparoscopy with D&C  She uses condoms for contraception        The following portions of the patient's history were reviewed and updated as appropriate: allergies, current medications, past family history, past medical history, past social history, past surgical history and problem list   Past Medical History:   Diagnosis Date   • COVID 2021     Past Surgical History:   Procedure Laterality Date   • AUGMENTATION MAMMAPLASTY Bilateral     2011, saline   • AUGMENTATION MAMMAPLASTY Bilateral     implants redone 21   • COSMETIC SURGERY Bilateral     Breast implants     OB History    Para Term  AB Living   4 3 3   1 3   SAB IAB Ectopic Multiple Live Births   1       3      # Outcome Date GA Lbr Mina/2nd Weight Sex Delivery Anes PTL Lv   4 SAB 2017           3 Term      Vag-Spont   VENKAT   2 Term      Vag-Spont VENKAT   1 Term 1994     Vag-Spont   VENKAT     No current facility-administered medications for this encounter      Current Outpatient Medications:   •  MAGNESIUM SULFATE PO, Take 1 tablet by mouth in the morning, Disp: , Rfl:   •  Nutritional Supplements (BALANCED NUTRITIONAL SHAKE PLS PO), Take by mouth in the morning, Disp: , Rfl:   •  TURMERIC PO, Take 1 tablet by mouth 2 (two) times a day, Disp: , Rfl:   No Known Allergies  Social History     Socioeconomic History   • Marital status: /Civil Union     Spouse name: None   • Number of children: 3   • Years of education: None   • Highest education level: None   Occupational History   • None   Tobacco Use   • Smoking status: Never   • Smokeless tobacco: Never   Vaping Use   • Vaping Use: Never used   Substance and Sexual Activity   • Alcohol use: Never   • Drug use: Never   • Sexual activity: Yes     Partners: Male   Other Topics Concern   • None   Social History Narrative   • None     Social Determinants of Health     Financial Resource Strain: Not on file   Food Insecurity: Not on file   Transportation Needs: Not on file   Physical Activity: Not on file   Stress: Not on file   Social Connections: Not on file   Intimate Partner Violence: Not on file   Housing Stability: Not on file     Family History   Problem Relation Age of Onset   • No Known Problems Mother    • Hypertension Father    • Breast cancer Sister         52   • No Known Problems Daughter    • No Known Problems Maternal Grandmother    • No Known Problems Maternal Grandfather    • No Known Problems Paternal Grandmother    • No Known Problems Paternal Grandfather    • No Known Problems Daughter    • Breast cancer Maternal Aunt    • Stomach cancer Maternal Aunt        Review of Systems    Objective   Physical Exam  OBGyn Exam     Objective      Ht 5' 3 5" (1 613 m)   Wt 70 3 kg (155 lb)   BMI 27 03 kg/m²     General:   alert and oriented, in no acute distress   Neck:    Breast:    Heart: regular rate and rhythm, S1, S2 normal, no murmur, click, rub or gallop   Lungs: clear to auscultation bilaterally   Abdomen: soft, non-tender, without masses or organomegaly   Vulva: normal   Vagina: Without erythema or lesions or discharge  Normal   Cervix: Without lesions or discharge or cervicitis    No Cervical motion tenderness   Uterus: top normal size, retroverted, non-tender   Adnexa: no mass, fullness, tenderness   Rectum: deferred    Psych:  Normal mood and affect   Skin:  Without obvious lesions   Eyes: symmetric, with normal movements and reactivity   Musculoskeletal:  Normal muscle tone and movements appreciated

## 2022-12-11 ENCOUNTER — ANESTHESIA EVENT (OUTPATIENT)
Dept: PERIOP | Facility: HOSPITAL | Age: 47
End: 2022-12-11

## 2022-12-13 ENCOUNTER — ANESTHESIA (OUTPATIENT)
Dept: PERIOP | Facility: HOSPITAL | Age: 47
End: 2022-12-13

## 2022-12-13 ENCOUNTER — HOSPITAL ENCOUNTER (OUTPATIENT)
Facility: HOSPITAL | Age: 47
Setting detail: OUTPATIENT SURGERY
Discharge: HOME | End: 2022-12-13
Attending: OBSTETRICS & GYNECOLOGY | Admitting: OBSTETRICS & GYNECOLOGY

## 2022-12-13 VITALS
RESPIRATION RATE: 16 BRPM | WEIGHT: 151.68 LBS | TEMPERATURE: 98.9 F | DIASTOLIC BLOOD PRESSURE: 56 MMHG | HEIGHT: 64 IN | HEART RATE: 53 BPM | OXYGEN SATURATION: 98 % | BODY MASS INDEX: 25.89 KG/M2 | SYSTOLIC BLOOD PRESSURE: 122 MMHG

## 2022-12-13 DIAGNOSIS — N92.1 MENOMETRORRHAGIA: ICD-10-CM

## 2022-12-13 DIAGNOSIS — N84.0 ENDOMETRIAL POLYP: ICD-10-CM

## 2022-12-13 LAB
EXT PREGNANCY TEST URINE: NEGATIVE
EXT. CONTROL: NORMAL

## 2022-12-13 RX ORDER — FENTANYL CITRATE 50 UG/ML
INJECTION, SOLUTION INTRAMUSCULAR; INTRAVENOUS AS NEEDED
Status: DISCONTINUED | OUTPATIENT
Start: 2022-12-13 | End: 2022-12-13

## 2022-12-13 RX ORDER — MAGNESIUM HYDROXIDE 1200 MG/15ML
LIQUID ORAL AS NEEDED
Status: DISCONTINUED | OUTPATIENT
Start: 2022-12-13 | End: 2022-12-13 | Stop reason: HOSPADM

## 2022-12-13 RX ORDER — FENTANYL CITRATE/PF 50 MCG/ML
25 SYRINGE (ML) INJECTION
Status: DISCONTINUED | OUTPATIENT
Start: 2022-12-13 | End: 2022-12-13 | Stop reason: HOSPADM

## 2022-12-13 RX ORDER — KETOROLAC TROMETHAMINE 30 MG/ML
INJECTION, SOLUTION INTRAMUSCULAR; INTRAVENOUS AS NEEDED
Status: DISCONTINUED | OUTPATIENT
Start: 2022-12-13 | End: 2022-12-13

## 2022-12-13 RX ORDER — ONDANSETRON 2 MG/ML
4 INJECTION INTRAMUSCULAR; INTRAVENOUS ONCE AS NEEDED
Status: COMPLETED | OUTPATIENT
Start: 2022-12-13 | End: 2022-12-13

## 2022-12-13 RX ORDER — SODIUM CHLORIDE 9 MG/ML
125 INJECTION, SOLUTION INTRAVENOUS CONTINUOUS
Status: DISCONTINUED | OUTPATIENT
Start: 2022-12-13 | End: 2022-12-13 | Stop reason: HOSPADM

## 2022-12-13 RX ORDER — MIDAZOLAM HYDROCHLORIDE 2 MG/2ML
INJECTION, SOLUTION INTRAMUSCULAR; INTRAVENOUS AS NEEDED
Status: DISCONTINUED | OUTPATIENT
Start: 2022-12-13 | End: 2022-12-13

## 2022-12-13 RX ORDER — ONDANSETRON 2 MG/ML
INJECTION INTRAMUSCULAR; INTRAVENOUS AS NEEDED
Status: DISCONTINUED | OUTPATIENT
Start: 2022-12-13 | End: 2022-12-13

## 2022-12-13 RX ORDER — ACETAMINOPHEN 325 MG/1
975 TABLET ORAL EVERY 6 HOURS PRN
Status: DISCONTINUED | OUTPATIENT
Start: 2022-12-13 | End: 2022-12-13 | Stop reason: HOSPADM

## 2022-12-13 RX ORDER — FERROUS SULFATE 325(65) MG
325 TABLET ORAL
COMMUNITY

## 2022-12-13 RX ORDER — DEXAMETHASONE SODIUM PHOSPHATE 10 MG/ML
INJECTION, SOLUTION INTRAMUSCULAR; INTRAVENOUS AS NEEDED
Status: DISCONTINUED | OUTPATIENT
Start: 2022-12-13 | End: 2022-12-13

## 2022-12-13 RX ORDER — ONDANSETRON 2 MG/ML
4 INJECTION INTRAMUSCULAR; INTRAVENOUS EVERY 6 HOURS PRN
Status: DISCONTINUED | OUTPATIENT
Start: 2022-12-13 | End: 2022-12-13 | Stop reason: HOSPADM

## 2022-12-13 RX ORDER — LIDOCAINE HYDROCHLORIDE 20 MG/ML
INJECTION, SOLUTION EPIDURAL; INFILTRATION; INTRACAUDAL; PERINEURAL AS NEEDED
Status: DISCONTINUED | OUTPATIENT
Start: 2022-12-13 | End: 2022-12-13

## 2022-12-13 RX ORDER — SODIUM CHLORIDE 9 MG/ML
INJECTION, SOLUTION INTRAVENOUS AS NEEDED
Status: DISCONTINUED | OUTPATIENT
Start: 2022-12-13 | End: 2022-12-13 | Stop reason: HOSPADM

## 2022-12-13 RX ORDER — PROPOFOL 10 MG/ML
INJECTION, EMULSION INTRAVENOUS AS NEEDED
Status: DISCONTINUED | OUTPATIENT
Start: 2022-12-13 | End: 2022-12-13

## 2022-12-13 RX ADMIN — DEXAMETHASONE SODIUM PHOSPHATE 10 MG: 10 INJECTION INTRAMUSCULAR; INTRAVENOUS at 07:35

## 2022-12-13 RX ADMIN — FENTANYL CITRATE 50 MCG: 50 INJECTION INTRAMUSCULAR; INTRAVENOUS at 07:55

## 2022-12-13 RX ADMIN — MIDAZOLAM 1 MG: 1 INJECTION INTRAMUSCULAR; INTRAVENOUS at 07:29

## 2022-12-13 RX ADMIN — MIDAZOLAM 1 MG: 1 INJECTION INTRAMUSCULAR; INTRAVENOUS at 07:22

## 2022-12-13 RX ADMIN — ONDANSETRON 4 MG: 2 INJECTION INTRAMUSCULAR; INTRAVENOUS at 08:49

## 2022-12-13 RX ADMIN — KETOROLAC TROMETHAMINE 15 MG: 30 INJECTION, SOLUTION INTRAMUSCULAR at 07:49

## 2022-12-13 RX ADMIN — SODIUM CHLORIDE 125 ML/HR: 0.9 INJECTION, SOLUTION INTRAVENOUS at 06:26

## 2022-12-13 RX ADMIN — LIDOCAINE HYDROCHLORIDE 40 MG: 20 INJECTION, SOLUTION EPIDURAL; INFILTRATION; INTRACAUDAL; PERINEURAL at 07:30

## 2022-12-13 RX ADMIN — ONDANSETRON 4 MG: 2 INJECTION INTRAMUSCULAR; INTRAVENOUS at 07:35

## 2022-12-13 RX ADMIN — PROPOFOL 150 MG: 10 INJECTION, EMULSION INTRAVENOUS at 07:30

## 2022-12-13 RX ADMIN — FENTANYL CITRATE 50 MCG: 50 INJECTION INTRAMUSCULAR; INTRAVENOUS at 07:35

## 2022-12-13 NOTE — ANESTHESIA POSTPROCEDURE EVALUATION
Post-Op Assessment Note    CV Status:  Stable    Pain management: adequate     Mental Status:  Alert and awake   Hydration Status:  Euvolemic   PONV Controlled:  Controlled   Airway Patency:  Patent      Post Op Vitals Reviewed: Yes      Staff: Anesthesiologist         No notable events documented      BP      Temp      Pulse    Resp      SpO2      /56   Pulse (!) 53   Temp 98 9 °F (37 2 °C) (Temporal)   Resp 16   Ht 5' 3 5" (1 613 m)   Wt 68 8 kg (151 lb 10 8 oz)   LMP 12/12/2022   SpO2 98%   BMI 26 45 kg/m²

## 2022-12-13 NOTE — OP NOTE
OPERATIVE REPORT  PATIENT NAME: Noah Abarca    :  1975  MRN: [id number]  Pt Location: [facility] OR ROOM 05    SURGERY DATE: 2022    Surgeon(s) and Role:     * Aliya Cross MD - Primary     * Yue Dahl MD - Assisting    Preop Diagnosis:  Menometrorrhagia [N92 1]  Endometrial polyp [N84 0]    Post-Op Diagnosis Codes:     * Menometrorrhagia [N92 1]     * Endometrial polyp [N84 0]    Procedure(s) (LRB):  D&C W/ HYSTEROSCOPY (N/A)  Endometrial Polypectomy (N/A)    Specimen(s):  ID Type Source Tests Collected by Time Destination   1 : ENDOMETRIAL CURRETTINGS & POLYP Tissue Endometrium TISSUE EXAM Aliya Cross MD 2022 1523        Estimated Blood Loss:   Minimal    Drains:  None    Anesthesia Type:   General LMA    Operative Indications:  Menometrorrhagia [N92 1]  Endometrial polyp [N84 0]      Operative Findings:   1  External genitalia grossly normal in appearance  No ulcerations, no lacerations, no lesions  2   Bimanual exam revealed midposition uterus with normal contours and freely mobile  No adnexal masses palpated bilaterally  3   Vagina and cervix were grossly normal in appearance without any lacerations or lesions  4  Uterus sounded to 8 5 cm   5  Hysteroscopic examination revealed proliferative endometrial lining  Polypoid tissue was noted at 9 o'clock and 2 o'clock  Bilateral ostia were visualized  Complications:   None apparent    Procedure and Technique:      The patient was taken to the operating room  General LMA anesthesia (LMA) was administered  Sequential compression devices were placed, and the patient was positioned on the OR table in the dorsal lithotomy position  All pressure points were padded, and a bethany hugger was placed to maintain control of core body temperature  A bimanual exam was performed, and the uterus was midposition and normal in size and consistency with no palpable uterine or adnexal masses    The patient was prepped and draped in the usual sterile fashion using chlorhexidine  A time out was performed to confirm correct patient and procedure  A straight catheter was introduced into the bladder, which was drained of 150 mL of clear yellow urine  A weighted speculum was inserted into the vagina, and a Monroy retractor was used to visualize the anterior lip of the cervix, which was then grasped with a single toothed tenaculum  The Monroy retractor was removed from the vagina  The uterus was sounded to 8 5 cm  The cervix was serially dilated to 16F using Hanks dilators for introduction of the hysteroscope  Hysteroscope was introduced under direct visualization using normal saline solution as the distention media  The hysteroscope was advanced to the uterine fundus, and the entire uterine cavity was inspected in a systematic manner  There was noted to be the above findings  The hysteroscope was withdrawn  Sharp curetting was performed, starting at the 12'oclock position and rotating a total of 360 degrees to cover all surfaces  Endometrial and polypoid tissue was obtained and sent for pathology  The hysteroscope was then re-introduced under direct visualization  The entire uterine cavity was inspected in a systematic manner  Adequate curetting was confirmed, and the hysteroscope was withdrawn  The fluid deficit reached 50mL  The single toothed tenaculum was removed from the anterior lip of the cervix  Mild oozing was noted from bilateral tenaculum sites and round forceps were applied  Round forceps were removed and good hemostasis was confirmed  The weighted speculum was removed from the vagina  At the conclusion of the procedure, all needle, sponge, and instrument counts were correct x2  Dr Ivory Miller was present and participated in all key portions of the case        Patient Disposition:  PACU        SIGNATURE: Nikhil Lucero MD  DATE: December 13, 2022  TIME: 8:13 AM

## 2022-12-13 NOTE — INTERVAL H&P NOTE
H&P reviewed  After examining the patient I find no changes in the patients condition since the H&P had been written      Vitals:    12/13/22 0548   BP: 117/58   Pulse: 61   Resp: 14   Temp: 98 °F (36 7 °C)   SpO2: 98%

## 2022-12-13 NOTE — ANESTHESIA PREPROCEDURE EVALUATION
Procedure:  (EUA) (Pelvis)  D&C W/ HYSTEROSCOPY (Uterus)  Endometrial Polypectomy (Uterus)         Physical Exam    Airway    Mallampati score: I         Dental       Cardiovascular  Rhythm: regular, Rate: normal, Cardiovascular exam normal    Pulmonary  Pulmonary exam normal     Other Findings        Anesthesia Plan  ASA Score- 2     Anesthesia Type- general with ASA Monitors  Additional Monitors:   Airway Plan: LMA  Plan Factors-Exercise tolerance (METS): >4 METS  Chart reviewed  Existing labs reviewed  Patient summary reviewed  Patient is not a current smoker  Obstructive sleep apnea risk education given perioperatively  Induction- intravenous  Postoperative Plan-     Informed Consent- Anesthetic plan and risks discussed with patient

## 2022-12-14 ENCOUNTER — TELEPHONE (OUTPATIENT)
Dept: GYNECOLOGY | Facility: CLINIC | Age: 47
End: 2022-12-14

## 2022-12-17 ENCOUNTER — OFFICE VISIT (OUTPATIENT)
Dept: URGENT CARE | Age: 47
End: 2022-12-17

## 2022-12-17 ENCOUNTER — NURSE TRIAGE (OUTPATIENT)
Dept: OTHER | Facility: OTHER | Age: 47
End: 2022-12-17

## 2022-12-17 VITALS — OXYGEN SATURATION: 98 % | TEMPERATURE: 98.2 F | HEART RATE: 90 BPM | RESPIRATION RATE: 12 BRPM

## 2022-12-17 DIAGNOSIS — J02.9 SORE THROAT: Primary | ICD-10-CM

## 2022-12-17 RX ORDER — PREDNISONE 10 MG/1
10 TABLET ORAL DAILY
Qty: 21 TABLET | Refills: 0 | Status: SHIPPED | OUTPATIENT
Start: 2022-12-17

## 2022-12-17 RX ORDER — AZITHROMYCIN 250 MG/1
TABLET, FILM COATED ORAL
Qty: 6 TABLET | Refills: 0 | Status: SHIPPED | OUTPATIENT
Start: 2022-12-17 | End: 2022-12-21

## 2022-12-17 NOTE — PROGRESS NOTES
[address] [facility]        NAME: Ivory Lopez is a 50 y o  female  : 1975    MRN: [id number]  DATE: 2022  TIME: 9:10 AM    Assessment and Plan   Sore throat [J02 9]  1  Sore throat  azithromycin (ZITHROMAX) 250 mg tablet    predniSONE 10 mg tablet        Instructions given, patient states amoxicillin does not work for her    Patient Instructions       Follow up with PCP in 3-5 days  Proceed to  ER if symptoms worsen  Chief Complaint     Chief Complaint   Patient presents with   • Sore Throat     Started 4 days ago with tickle/scratchy throat; felt like she could not swallow yesterday   • Earache     bilaterally   • Headache         History of Present Illness       HPI  Patient presents today complaining of sore throat started about 4 days ago, she is having a very itchy take ovarian scratchy throat  Patient felt like she cannot swallow yesterday  She is also having bilateral earaches  Patient is also complaining headache  She had a D&C for polypectomy about 4 days ago  Patient does not have any fevers or chills    Review of Systems   Review of Systems  Per hpi     Current Medications       Current Outpatient Medications:   •  azithromycin (ZITHROMAX) 250 mg tablet, Take 2 tablets today then 1 tablet daily x 4 days, Disp: 6 tablet, Rfl: 0  •  predniSONE 10 mg tablet, Take 1 tablet (10 mg total) by mouth daily 6 tab day 1, 5 tab day 2, 4 tab day 3, 3 tab day 4, 2 tab day 5, 1 tab day 6, Disp: 21 tablet, Rfl: 0  •  ferrous sulfate 325 (65 Fe) mg tablet, Take 325 mg by mouth daily with breakfast, Disp: , Rfl:   •  MAGNESIUM SULFATE PO, Take 1 tablet by mouth in the morning, Disp: , Rfl:   •  Nutritional Supplements (BALANCED NUTRITIONAL SHAKE PLS PO), Take by mouth in the morning, Disp: , Rfl:   •  TURMERIC PO, Take 1 tablet by mouth 2 (two) times a day, Disp: , Rfl:     Current Allergies     Allergies as of 2022   • (No Known Allergies)            The following portions of the patient's history were reviewed and updated as appropriate: allergies, current medications, past family history, past medical history, past social history, past surgical history and problem list      Past Medical History:   Diagnosis Date   • COVID 04/2021       Past Surgical History:   Procedure Laterality Date   • AUGMENTATION MAMMAPLASTY Bilateral     2011, saline   • AUGMENTATION MAMMAPLASTY Bilateral     implants redone 6/30/21   • COSMETIC SURGERY Bilateral     Breast implants   • [place] HYSTEROSCOPY,W/ENDO BX N/A 12/13/2022    Procedure: D&C W/ HYSTEROSCOPY;  Surgeon: Roopa Basurto MD;  Location: [facility];  Service: Gynecology   • [place] HYSTEROSCOPY,W/ENDO BX N/A 12/13/2022    Procedure: Endometrial Polypectomy;  Surgeon: Roopa Basurto MD;  Location[facility];  Service: Gynecology       Family History   Problem Relation Age of Onset   • No Known Problems Mother    • Hypertension Father    • Breast cancer Sister         52   • No Known Problems Daughter    • No Known Problems Maternal Grandmother    • No Known Problems Maternal Grandfather    • No Known Problems Paternal Grandmother    • No Known Problems Paternal Grandfather    • No Known Problems Daughter    • Breast cancer Maternal Aunt    • Stomach cancer Maternal Aunt          Medications have been verified  Objective   Pulse 90   Temp 98 2 °F (36 8 °C) (Temporal)   Resp 12   LMP 12/12/2022   SpO2 98%   Patient's last menstrual period was 12/12/2022  Physical Exam     Physical Exam  Constitutional:       General: She is not in acute distress  Appearance: Normal appearance  HENT:      Head: Normocephalic  Nose: No congestion or rhinorrhea  Mouth/Throat:      Mouth: Mucous membranes are moist       Pharynx: Posterior oropharyngeal erythema present  No oropharyngeal exudate  Tonsils: Tonsillar exudate present  1+ on the right  1+ on the left  Eyes:      General:         Right eye: No discharge           Left eye: No discharge  Conjunctiva/sclera: Conjunctivae normal    Cardiovascular:      Rate and Rhythm: Normal rate and regular rhythm  Pulses: Normal pulses  Pulmonary:      Effort: Pulmonary effort is normal  No respiratory distress  Abdominal:      General: Abdomen is flat  There is no distension  Palpations: Abdomen is soft  Tenderness: There is no abdominal tenderness  Musculoskeletal:      Cervical back: Neck supple  Skin:     General: Skin is warm  Capillary Refill: Capillary refill takes less than 2 seconds  Neurological:      Mental Status: She is alert and oriented to person, place, and time

## 2022-12-17 NOTE — TELEPHONE ENCOUNTER
Regarding: Sore throat, headache, and ear ache  ----- Message from Yann French sent at 12/17/2022  7:24 AM EST -----  "I have a sore throat and it hurts when I swallow and my sinus is bothering me  I have a headache and my ears hurt   I had a hysterectomy on 12/13/22 and not sure if its from that "

## 2022-12-17 NOTE — TELEPHONE ENCOUNTER
Patient called in stating she has pain 9/10 in throat, nasal congestions,headache,ear pain, and cough  States she had hysterectomy on 12/13/2022  Denies any fever, bleeding or difficulty from procedure  Advised UC for symptoms  Provided location to [address]  Patient verbalized understanding

## 2022-12-17 NOTE — TELEPHONE ENCOUNTER
Reason for Disposition  • SEVERE (e g , excruciating) throat pain    Answer Assessment - Initial Assessment Questions  1  ONSET: "When did the throat start hurting?" (Hours or days ago)       12/13/2022  2  SEVERITY: "How bad is the sore throat?" (Scale 1-10; mild, moderate or severe)    - MILD (1-3):  doesn't interfere with eating or normal activities    - MODERATE (4-7): interferes with eating some solids and normal activities    - SEVERE (8-10):  excruciating pain, interferes with most normal activities    - SEVERE DYSPHAGIA: can't swallow liquids, drooling     9/10  3  STREP EXPOSURE: "Has there been any exposure to strep within the past week?" If Yes, ask: "What type of contact occurred?"     Denies   4  VIRAL SYMPTOMS: "Are there any symptoms of a cold, such as a runny nose, cough, hoarse voice or red eyes?"     Nasal congestion   5  FEVER: "Do you have a fever?" If Yes, ask: "What is your temperature, how was it measured, and when did it start?"      Denies   6  PUS ON THE TONSILS: "Is there pus on the tonsils in the back of your throat?"      Denies   7   OTHER SYMPTOMS: "Do you have any other symptoms?" (e g , difficulty breathing, headache, rash)     Phlegm , dry cough , ear pain   Tylenol    Protocols used: SORE THROAT-ADULT-

## 2022-12-17 NOTE — PATIENT INSTRUCTIONS
Pharyngitis   AMBULATORY CARE:   Pharyngitis , or sore throat, is inflammation of the tissues and structures in your pharynx (throat)  Pharyngitis is most often caused by bacteria  It may also be caused by a cold or flu virus  Other causes include smoking, allergies, or acid reflux  Signs and symptoms that may occur with pharyngitis:   Sore throat or pain when you swallow    Fever, chills, and body aches    Hoarse or raspy voice    Cough, runny or stuffy nose, itchy or watery eyes    Headache    Upset stomach and loss of appetite    Mild neck stiffness    Swollen glands that feel like hard lumps when you touch your neck    White and yellow pus-filled blisters in the back of your throat    Call 911 for any of the following: You have trouble breathing or swallowing because your throat is swollen or sore  Seek care immediately if:   You are drooling because it hurts too much to swallow  Your fever is higher than 102? F (39?C) or lasts longer than 3 days  You are confused  You taste blood in your throat  Contact your healthcare provider if:   Your throat pain gets worse  You have a painful lump in your throat that does not go away after 5 days  Your symptoms do not improve after 5 days  You have questions or concerns about your condition or care  Treatment for pharyngitis:  Viral pharyngitis will go away on its own without treatment  Your sore throat should start to feel better in 3 to 5 days for both viral and bacterial infections  You may need any of the following:  Antibiotics  treat a bacterial infection  NSAIDs , such as ibuprofen, help decrease swelling, pain, and fever  NSAIDs can cause stomach bleeding or kidney problems in certain people  If you take blood thinner medicine, always ask your healthcare provider if NSAIDs are safe for you  Always read the medicine label and follow directions  Acetaminophen  decreases pain and fever  It is available without a doctor's order  Ask how much to take and how often to take it  Follow directions  Acetaminophen can cause liver damage if not taken correctly  Manage your symptoms:   Gargle salt water  Mix ¼ teaspoon salt in an 8 ounce glass of warm water and gargle  This may help decrease swelling in your throat  Drink liquids as directed  You may need to drink more liquids than usual  Liquids may help soothe your throat and prevent dehydration  Ask how much liquid to drink each day and which liquids are best for you  Use a cool-steam humidifier  to help moisten the air in your room and calm your cough  Soothe your throat  with cough drops, ice, soft foods, or popsicles  Prevent the spread of pharyngitis:  Cover your mouth and nose when you cough or sneeze  Do not share food or drinks  Wash your hands often  Use soap and water  If soap and water are unavailable, use an alcohol based hand   Follow up with your doctor as directed:  Write down your questions so you remember to ask them during your visits  © Copyright [employer] 2022 Information is for End User's use only and may not be sold, redistributed or otherwise used for commercial purposes  All illustrations and images included in CareNotes® are the copyrighted property of A D A M , Inc  or [facility] Ame Izquierdo   The above information is an  only  It is not intended as medical advice for individual conditions or treatments  Talk to your doctor, nurse or pharmacist before following any medical regimen to see if it is safe and effective for you

## 2022-12-22 ENCOUNTER — OFFICE VISIT (OUTPATIENT)
Dept: GYNECOLOGY | Facility: CLINIC | Age: 47
End: 2022-12-22

## 2022-12-22 ENCOUNTER — ULTRASOUND (OUTPATIENT)
Dept: GYNECOLOGY | Facility: CLINIC | Age: 47
End: 2022-12-22

## 2022-12-22 VITALS
HEIGHT: 64 IN | WEIGHT: 153 LBS | DIASTOLIC BLOOD PRESSURE: 60 MMHG | SYSTOLIC BLOOD PRESSURE: 106 MMHG | BODY MASS INDEX: 26.12 KG/M2

## 2022-12-22 DIAGNOSIS — N83.202 LEFT OVARIAN CYST: ICD-10-CM

## 2022-12-22 DIAGNOSIS — N83.202 LEFT OVARIAN CYST: Primary | ICD-10-CM

## 2022-12-22 DIAGNOSIS — N84.0 ENDOMETRIAL POLYP: ICD-10-CM

## 2022-12-22 DIAGNOSIS — N92.1 MENOMETRORRHAGIA: Primary | ICD-10-CM

## 2022-12-22 DIAGNOSIS — N80.03 ADENOMYOSIS: ICD-10-CM

## 2022-12-22 NOTE — PROGRESS NOTES
Assessment/Plan   Diagnoses and all orders for this visit:    Menometrorrhagia    Left ovarian cyst    Adenomyosis    Endometrial polyp    1  menometrorrhagia with presumed endometrial polyp-status post hysteroscopy with D&C with polypectomy 12/13/2022  Pathology demonstrated benign appearing endometrium without definite polyp tissue  Patient was shown the intraoperative photos and the pathology report  She has had no bleeding since the procedure was done  She will call return with any issues  2  likely adenomyosis-again confirmed on ultrasound today  To watch for symptoms of menometrorrhagia going forward  3  left ovarian cyst- today, 2 follicles measuring 2 5 and 2 2 cm were noted  The previous 4 2 cm complex cyst with septations seems to have resolved  Patient was counseled about the findings  She denies any pain or discomfort  Would recommend follow-up in 3 months time to continue to monitor  4  contraception-condoms  5  prior history of fibroids-none documented on ultrasound today  Follow-up 3 months for ultrasound and office visit with me or as needed  Subjective   Patient ID: Elayne Robert is a 50 y o  female  Vitals:    12/22/22 0807   BP: 106/60     Patient was seen today for follow-up visit with ultrasound  Please see assessment plan and ultrasound note for details        The following portions of the patient's history were reviewed and updated as appropriate: allergies, current medications, past family history, past medical history, past social history, past surgical history and problem list   Past Medical History:   Diagnosis Date   • COVID 04/2021     Past Surgical History:   Procedure Laterality Date   • AUGMENTATION MAMMAPLASTY Bilateral     2011, saline   • AUGMENTATION MAMMAPLASTY Bilateral     implants redone 6/30/21   • COSMETIC SURGERY Bilateral     Breast implants   • [place] HYSTEROSCOPY,W/ENDO BX N/A 12/13/2022    Procedure: D&C W/ HYSTEROSCOPY;  Surgeon: Eduarda Martinez MD; Location: [facility];  Service: Gynecology   • [place] HYSTEROSCOPY,W/ENDO BX N/A 2022    Procedure: Endometrial Polypectomy;  Surgeon: Samanta Weiner MD;  Location[facility];  Service: Gynecology     OB History    Para Term  AB Living   4 3 3   1 3   SAB IAB Ectopic Multiple Live Births   1       3      # Outcome Date GA Lbr Mina/2nd Weight Sex Delivery Anes PTL Lv   4 SAB 2017           3 Term      Vag-Spont   VENKAT   2 Term      Vag-Spont   VENKAT   1 Term      Vag-Spont   VENKAT       Current Outpatient Medications:   •  ferrous sulfate 325 (65 Fe) mg tablet, Take 325 mg by mouth daily with breakfast, Disp: , Rfl:   •  MAGNESIUM SULFATE PO, Take 1 tablet by mouth in the morning, Disp: , Rfl:   •  Nutritional Supplements (BALANCED NUTRITIONAL SHAKE PLS PO), Take by mouth in the morning, Disp: , Rfl:   •  predniSONE 10 mg tablet, Take 1 tablet (10 mg total) by mouth daily 6 tab day 1, 5 tab day 2, 4 tab day 3, 3 tab day 4, 2 tab day 5, 1 tab day 6, Disp: 21 tablet, Rfl: 0  •  TURMERIC PO, Take 1 tablet by mouth 2 (two) times a day, Disp: , Rfl:   No Known Allergies  Social History     Socioeconomic History   • Marital status: /Civil Union     Spouse name: None   • Number of children: 3   • Years of education: None   • Highest education level: None   Occupational History   • None   Tobacco Use   • Smoking status: Never   • Smokeless tobacco: Never   Vaping Use   • Vaping Use: Never used   Substance and Sexual Activity   • Alcohol use: Never   • Drug use: Never   • Sexual activity: Yes     Partners: Male   Other Topics Concern   • None   Social History Narrative   • None     Social Determinants of Health     Financial Resource Strain: Not on file   Food Insecurity: Not on file   Transportation Needs: Not on file   Physical Activity: Not on file   Stress: Not on file   Social Connections: Not on file   Intimate Partner Violence: Not on file   Housing Stability: Not on file     Family History Problem Relation Age of Onset   • No Known Problems Mother    • Hypertension Father    • Breast cancer Sister         52   • No Known Problems Daughter    • No Known Problems Maternal Grandmother    • No Known Problems Maternal Grandfather    • No Known Problems Paternal Grandmother    • No Known Problems Paternal Grandfather    • No Known Problems Daughter    • Breast cancer Maternal Aunt    • Stomach cancer Maternal Aunt        Review of Systems   Constitutional: Negative for chills, diaphoresis, fatigue and fever  Respiratory: Negative for apnea, cough, chest tightness, shortness of breath and wheezing  Cardiovascular: Negative for chest pain, palpitations and leg swelling  Gastrointestinal: Negative for abdominal distention, abdominal pain, anal bleeding, constipation, diarrhea, nausea, rectal pain and vomiting  Genitourinary: Negative for difficulty urinating, dyspareunia, dysuria, frequency, hematuria, menstrual problem, pelvic pain, urgency, vaginal bleeding, vaginal discharge and vaginal pain  Musculoskeletal: Negative for arthralgias, back pain and myalgias  Skin: Negative for color change and rash  Neurological: Negative for dizziness, syncope, light-headedness, numbness and headaches  Hematological: Negative for adenopathy  Does not bruise/bleed easily  Psychiatric/Behavioral: Negative for dysphoric mood and sleep disturbance  The patient is not nervous/anxious          Objective   Physical Exam  OBGyn Exam     Objective      /60 (BP Location: Left arm, Patient Position: Sitting)   Ht 5' 3 5" (1 613 m)   Wt 69 4 kg (153 lb)   LMP 12/12/2022   BMI 26 68 kg/m²     General:   alert and oriented, in no acute distress   Neck:    Breast:    Heart:    Lungs:    Abdomen:  Nontender   Vulva:    Vagina:    Cervix:    Uterus:    Adnexa:    Rectum:     Psych:  Normal mood and affect   Skin:  Without obvious lesions   Eyes: symmetric, with normal movements and reactivity   Musculoskeletal:  Normal muscle tone and movements appreciated

## 2022-12-22 NOTE — PROGRESS NOTES
AMB US Pelvic Non OB    Date/Time: 12/22/2022 6:51 AM  Performed by: Sander Arroyo  Authorized by: Marv Gore MD   Universal Protocol:  Patient identity confirmed: verbally with patient      Procedure details:     Technique:  Transvaginal US, Non-OB    Position: lithotomy exam    Uterine findings:     Length (cm): 11 02    Height (cm):  5 99    Width (cm):  8 24    Endometrial stripe: identified      Endometrium thickness (mm):  9 5  Left ovary findings:     Left ovary:  Visualized    Length (cm): 5 32    Height (cm): 2 31    Width (cm): 2 68  Right ovary findings:     Right ovary:  Visualized    Length (cm): 3 17    Height (cm): 1 56    Width (cm): 1 92  Other findings:     Free pelvic fluid: not identified      Free peritoneal fluid: not identified    Post-Procedure Details:     Impression:  Retroverted uterus is inhomogeneous throughout without fibroids  Low level echogenicities are present throughout the myometrium suggestive of adenomyosis  There are nabothian cysts present in cervix  The right ovary appears within normal limits  The left ovary demonstrates two follicles; 0 1FM and 6 8JC  No free fluid  Tolerance: Tolerated well, no immediate complications    Complications: no complications    Additional Procedure Comments:      Smart Balloon F8 E8C-RS transvaginal transducer Serial # [id number] was used to perform the examination today and subsequently followed with high level disinfection utilizing Trophon EPR procedure  Ultrasound performed at:     [address] [address]  Phone:  [phone]  Fax:  [phone]

## 2022-12-23 ENCOUNTER — TELEPHONE (OUTPATIENT)
Dept: GASTROENTEROLOGY | Facility: MEDICAL CENTER | Age: 47
End: 2022-12-23

## 2022-12-23 NOTE — TELEPHONE ENCOUNTER
Patient will need a consult before her colonoscopy due to having an abdominal procedure on 12/13/22  Please schedule patient for a consult  Thank you

## 2022-12-23 NOTE — TELEPHONE ENCOUNTER
Patient called saying that she is returning a phone that she had got from the office regarding schedule consult for colonoscopy, patient is asking if the office can give her back a call

## 2023-03-13 ENCOUNTER — TELEPHONE (OUTPATIENT)
Dept: GASTROENTEROLOGY | Facility: CLINIC | Age: 48
End: 2023-03-13

## 2023-03-13 ENCOUNTER — PREP FOR PROCEDURE (OUTPATIENT)
Dept: GASTROENTEROLOGY | Facility: CLINIC | Age: 48
End: 2023-03-13

## 2023-03-13 DIAGNOSIS — Z12.11 SCREENING FOR COLON CANCER: Primary | ICD-10-CM

## 2023-03-13 NOTE — TELEPHONE ENCOUNTER
Scheduled date of colonoscopy (as of today):4/27/23    Physician performing colonoscopy:Dr Mullins    Location of colonoscopy:[place]

## 2023-03-22 NOTE — PROGRESS NOTES
AMB US Pelvic Non OB    Date/Time: 3/23/2023 8:00 AM  Performed by: Sarai Murray  Authorized by: Remi Winchester MD   Universal Protocol:  Consent given by: patient  Patient understanding: patient states understanding of the procedure being performed  Patient identity confirmed: verbally with patient      Procedure details:     Technique:  Transvaginal US, Non-OB    Position: lithotomy exam    Uterine findings:     Length (cm): 10 08    Height (cm):  6 85    Width (cm):  8 13    Endometrial stripe: identified      Endometrium thickness (mm):  18 9  Left ovary findings:     Left ovary:  Visualized    Length (cm): 2 81    Height (cm): 1 79    Width (cm): 1 94  Right ovary findings:     Right ovary:  Visualized    Length (cm): 4 75    Height (cm): 2 57    Width (cm): 5 14  Other findings:     Free pelvic fluid: not identified      Free peritoneal fluid: not identified    Post-Procedure Details:     Impression:  Retroverted uterus is inhomogeneous throughout without fibroids  Low level echogenicities are present throughout the myometrium suggestive of adenomyosis  There are nabothian cysts present in cervix  The left ovary appears within normal limits  The right ovary demonstrates a 3 7cm simple cyst  No free fluid  Tolerance: Tolerated well, no immediate complications    Complications: no complications    Additional Procedure Comments:      [employer] F8 E8C-RS transvaginal transducer Serial # [id number] was used to perform the examination today and subsequently followed with high level disinfection utilizing Trophon EPR procedure  Ultrasound performed at:     [address] [address] [address] [facility]  Phone:  [phone]  Fax:  [phone]

## 2023-03-23 ENCOUNTER — OFFICE VISIT (OUTPATIENT)
Dept: GYNECOLOGY | Facility: CLINIC | Age: 48
End: 2023-03-23

## 2023-03-23 ENCOUNTER — ULTRASOUND (OUTPATIENT)
Dept: GYNECOLOGY | Facility: CLINIC | Age: 48
End: 2023-03-23

## 2023-03-23 VITALS
HEART RATE: 60 BPM | HEIGHT: 64 IN | DIASTOLIC BLOOD PRESSURE: 64 MMHG | BODY MASS INDEX: 26.8 KG/M2 | WEIGHT: 157 LBS | SYSTOLIC BLOOD PRESSURE: 118 MMHG

## 2023-03-23 DIAGNOSIS — N92.1 MENOMETRORRHAGIA: ICD-10-CM

## 2023-03-23 DIAGNOSIS — N83.201 RIGHT OVARIAN CYST: Primary | ICD-10-CM

## 2023-03-23 DIAGNOSIS — R10.9 ABDOMINAL PAIN, UNSPECIFIED ABDOMINAL LOCATION: ICD-10-CM

## 2023-03-23 DIAGNOSIS — N83.202 LEFT OVARIAN CYST: Primary | ICD-10-CM

## 2023-03-23 NOTE — PROGRESS NOTES
Assessment/Plan   Diagnoses and all orders for this visit:    Right ovarian cyst    Abdominal pain, unspecified abdominal location    Menometrorrhagia    1  menometrorrhagia with presumed endometrial polyp-status post hysteroscopy with D&C with polypectomy done 12/13/2022  Pathology was benign without definitive polyp seen  Is very happy, has normal menses at this time after the D&C  She will continue to follow and call or return with any menometrorrhagia symptoms  2  likely adenomyosis- again suggested on ultrasound today with nabothian cyst on the cervix  Patient denies any complaints  Will return with any issues  3  previous left ovarian cyst-resolved on ultrasound today  Was lastly 2 5 and 2 2 cm cysts with previous 4 2 cm complex cyst   4  right ovarian cyst-3 7 cm cyst noted right ovary  Patient denies any symptoms  She was counseled about this finding  Sounded office visit with me  5  contraception-condoms  6  history of possible fibroids-none noted again on ultrasound today  7  other- her family had 8 girls and 3 boys  's family had 6 boys and 6 girls  Follow-up 3 months for ultrasound and office visit with me  Subjective   Patient ID: Shaheen Guerra is a 50 y o  female  Vitals:    03/23/23 0749   BP: 118/64   Pulse: 60     Patient was seen today for follow-up visit with ultrasound  Please see assessment plan and ultrasound note for details        The following portions of the patient's history were reviewed and updated as appropriate: allergies, current medications, past family history, past medical history, past social history, past surgical history and problem list   Past Medical History:   Diagnosis Date   • COVID 04/2021     Past Surgical History:   Procedure Laterality Date   • AUGMENTATION MAMMAPLASTY Bilateral     2011, saline   • AUGMENTATION MAMMAPLASTY Bilateral     implants redone 6/30/21   • COSMETIC SURGERY Bilateral     Breast implants   • [place] HYSTEROSCOPY BX ENDOMETRIUM&/POLYPC W/WO D&C N/A 2022    Procedure: D&C W/ HYSTEROSCOPY;  Surgeon: Donna Gunderson MD;  [facility];  Service: Gynecology   • [place] HYSTEROSCOPY BX ENDOMETRIUM&/POLYPC W/WO D&C N/A 2022    Procedure: Endometrial Polypectomy;  Surgeon: Donna Gunderson MD;  [facility];  Service: Gynecology     OB History    Para Term  AB Living   4 3 3   1 3   SAB IAB Ectopic Multiple Live Births   1       3      # Outcome Date GA Lbr Mina/2nd Weight Sex Delivery Anes PTL Lv   4 SAB 2017           3 Term      Vag-Spont   VENKAT   2 Term      Vag-Spont   VENKAT   1 Term      Vag-Spont   VENKAT       Current Outpatient Medications:   •  ferrous sulfate 325 (65 Fe) mg tablet, Take 325 mg by mouth daily with breakfast, Disp: , Rfl:   •  MAGNESIUM SULFATE PO, Take 1 tablet by mouth in the morning, Disp: , Rfl:   •  Nutritional Supplements (BALANCED NUTRITIONAL SHAKE PLS PO), Take by mouth in the morning, Disp: , Rfl:   •  predniSONE 10 mg tablet, Take 1 tablet (10 mg total) by mouth daily 6 tab day 1, 5 tab day 2, 4 tab day 3, 3 tab day 4, 2 tab day 5, 1 tab day 6, Disp: 21 tablet, Rfl: 0  •  TURMERIC PO, Take 1 tablet by mouth 2 (two) times a day, Disp: , Rfl:   No Known Allergies  Social History     Socioeconomic History   • Marital status: /Civil Union     Spouse name: None   • Number of children: 3   • Years of education: None   • Highest education level: None   Occupational History   • None   Tobacco Use   • Smoking status: Never   • Smokeless tobacco: Never   Vaping Use   • Vaping Use: Never used   Substance and Sexual Activity   • Alcohol use: Never   • Drug use: Never   • Sexual activity: Yes     Partners: Male   Other Topics Concern   • None   Social History Narrative   • None     Social Determinants of Health     Financial Resource Strain: Not on file   Food Insecurity: Not on file   Transportation Needs: Not on file   Physical Activity: Not on file   Stress: Not on file   Social Connections: Not on file   Intimate Partner Violence: Not on file   Housing Stability: Not on file     Family History   Problem Relation Age of Onset   • No Known Problems Mother    • Hypertension Father    • Breast cancer Sister         52   • No Known Problems Daughter    • No Known Problems Maternal Grandmother    • No Known Problems Maternal Grandfather    • No Known Problems Paternal Grandmother    • No Known Problems Paternal Grandfather    • No Known Problems Daughter    • Breast cancer Maternal Aunt    • Stomach cancer Maternal Aunt        Review of Systems   Constitutional: Negative for chills, diaphoresis, fatigue and fever  Respiratory: Negative for apnea, cough, chest tightness, shortness of breath and wheezing  Cardiovascular: Negative for chest pain, palpitations and leg swelling  Gastrointestinal: Negative for abdominal distention, abdominal pain, anal bleeding, constipation, diarrhea, nausea, rectal pain and vomiting  Genitourinary: Negative for difficulty urinating, dyspareunia, dysuria, frequency, hematuria, menstrual problem, pelvic pain, urgency, vaginal bleeding, vaginal discharge and vaginal pain  Musculoskeletal: Negative for arthralgias, back pain and myalgias  Skin: Negative for color change and rash  Neurological: Negative for dizziness, syncope, light-headedness, numbness and headaches  Hematological: Negative for adenopathy  Does not bruise/bleed easily  Psychiatric/Behavioral: Negative for dysphoric mood and sleep disturbance  The patient is not nervous/anxious          Objective   Physical Exam  OBGyn Exam     Objective      /64 (BP Location: Right arm, Patient Position: Sitting, Cuff Size: Standard)   Pulse 60   Ht 5' 3 5" (1 613 m)   Wt 71 2 kg (157 lb)   LMP 03/14/2023 (Exact Date)   BMI 27 38 kg/m²     General:   alert and oriented, in no acute distress   Neck:    Breast:    Heart:    Lungs:    Abdomen:  Nontender   Vulva:    Vagina: Cervix:    Uterus:    Adnexa:    Rectum:     Psych:  Normal mood and affect   Skin:  Without obvious lesions   Eyes: symmetric, with normal movements and reactivity   Musculoskeletal:  Normal muscle tone and movements appreciated

## 2023-04-21 RX ORDER — SODIUM CHLORIDE 9 MG/ML
125 INJECTION, SOLUTION INTRAVENOUS CONTINUOUS
Status: CANCELLED | OUTPATIENT
Start: 2023-04-21

## 2023-04-24 ENCOUNTER — HOSPITAL ENCOUNTER (OUTPATIENT)
Dept: GASTROENTEROLOGY | Facility: MEDICAL CENTER | Age: 48
Setting detail: OUTPATIENT SURGERY
Discharge: HOME | End: 2023-04-24
Attending: INTERNAL MEDICINE | Admitting: INTERNAL MEDICINE

## 2023-04-24 VITALS
SYSTOLIC BLOOD PRESSURE: 128 MMHG | HEIGHT: 63 IN | BODY MASS INDEX: 27.81 KG/M2 | HEART RATE: 55 BPM | TEMPERATURE: 98.3 F | WEIGHT: 156.97 LBS | RESPIRATION RATE: 15 BRPM | OXYGEN SATURATION: 100 % | DIASTOLIC BLOOD PRESSURE: 61 MMHG

## 2023-04-24 DIAGNOSIS — Z12.11 SCREENING FOR COLON CANCER: ICD-10-CM

## 2023-04-24 RX ORDER — SODIUM CHLORIDE 9 MG/ML
125 INJECTION, SOLUTION INTRAVENOUS CONTINUOUS
Status: DISCONTINUED | OUTPATIENT
Start: 2023-04-24 | End: 2023-04-28 | Stop reason: HOSPADM

## 2023-06-09 ENCOUNTER — ULTRASOUND (OUTPATIENT)
Dept: GYNECOLOGY | Facility: CLINIC | Age: 48
End: 2023-06-09
Payer: COMMERCIAL

## 2023-06-09 ENCOUNTER — OFFICE VISIT (OUTPATIENT)
Dept: GYNECOLOGY | Facility: CLINIC | Age: 48
End: 2023-06-09
Payer: COMMERCIAL

## 2023-06-09 VITALS
BODY MASS INDEX: 28.14 KG/M2 | HEIGHT: 63 IN | SYSTOLIC BLOOD PRESSURE: 116 MMHG | DIASTOLIC BLOOD PRESSURE: 72 MMHG | WEIGHT: 158.8 LBS

## 2023-06-09 DIAGNOSIS — N83.201 RIGHT OVARIAN CYST: Primary | ICD-10-CM

## 2023-06-09 DIAGNOSIS — N80.03 ADENOMYOSIS: ICD-10-CM

## 2023-06-09 DIAGNOSIS — N83.202 LEFT OVARIAN CYST: Primary | ICD-10-CM

## 2023-06-09 DIAGNOSIS — R10.9 ABDOMINAL PAIN, UNSPECIFIED ABDOMINAL LOCATION: ICD-10-CM

## 2023-06-09 PROCEDURE — 99213 OFFICE O/P EST LOW 20 MIN: CPT | Performed by: OBSTETRICS & GYNECOLOGY

## 2023-06-09 PROCEDURE — 76830 TRANSVAGINAL US NON-OB: CPT | Performed by: OBSTETRICS & GYNECOLOGY

## 2023-06-09 NOTE — PROGRESS NOTES
AMB US Pelvic Non OB    Date/Time: 6/9/2023 8:00 AM    Performed by: Valarie Sotomayor  Authorized by: Nile Long MD  Universal Protocol:  Consent given by: patient  Patient identity confirmed: verbally with patient      Procedure details:     Indications: ovarian cysts      Technique:  Transvaginal US, Non-OB    Position: lithotomy exam    Uterine findings:     Length (cm): 10 31    Height (cm):  6 5    Width (cm):  7 83    Endometrial stripe: identified      Endometrium thickness (mm):  7 5  Left ovary findings:     Left ovary:  Visualized    Length (cm): 3 53    Height (cm): 2 02    Width (cm): 2 38  Right ovary findings:     Right ovary:  Visualized    Length (cm): 3 44    Height (cm): 2 11    Width (cm): 2 18  Other findings:     Free pelvic fluid: identified    Post-Procedure Details:     Impression:  Retroverted uterus is inhomogeneous throughout without fibroids  Low level echogenicities are again present throughout the myometrium suggestive of adenomyosis  There are nabothian cysts present in cervix  Bilateral ovaries appear within normal limits  The left ovary demonstrates a 3 7IS follicle  The previously noted left ovarian cyst is not seen  A small amount of free fluid is noted in the cul de sac  Tolerance: Tolerated well, no immediate complications    Complications: no complications    Additional Procedure Comments:      Siving Egil Kvaleberg F8 E8C-RS transvaginal transducer Serial # [id number] was used to perform the examination today and subsequently followed with high level disinfection utilizing Trophon EPR procedure  Ultrasound performed at:     [id number] [address] [address] [facility]  Phone:  [phone]  Fax:  [phone]

## 2023-06-09 NOTE — PROGRESS NOTES
"Assessment/Plan   Diagnoses and all orders for this visit:    Right ovarian cyst    Adenomyosis    Abdominal pain, unspecified abdominal location    1  resolved right ovarian cyst/history of bilateral ovarian cyst- ultrasound today with resolution of any cyst   Patient was relieved to hear this  She will call or return with any issues  2  previous history of menometrorrhagia with presumed endometrial polyp- status post hysteroscopy with D&C with polypectomy done 12/13/2022  Pathology was benign with no definitive polyp seen  She has had normal menses since [facility]  was done  In fact, her last menses was first week of May so she may be skipping this cycle  She will call or return with any recurrence of menometrorrhagia  3   Perimenopausal- may be late for her menses due to this  She denies any risk of pregnancy  To call return with any issues  4  probable adenomyosis-again noted on ultrasound today  Patient denies any menometrorrhagia or significant dysmenorrhea  To call with any such symptoms  No fibroids were again seen on ultrasound  5  contraception-condoms  6  other-patient states like \"she feels funny down there\"  She thinks it is related to her recent weight gain  She denies any urinary or bowel movement issues  Ultrasound was with resolution of cysts and she was relieved to hear this  Did offer pelvic exam today, she wished to defer  She did see gastroenterologist recently with good findings by report  Recommend she call or return as needed especially if any severe abdominal or pelvic pain, fevers, heavy/irregular menses or other issues  Otherwise, follow-up October 2023 for yearly exam or as needed  Subjective   Patient ID: Dylan Johnson is a 50 y o  female  Vitals:    06/09/23 0802   BP: 116/72     Patient was seen today for ultrasound and follow-up visit  Please see assessment plan for details        The following portions of the patient's history were reviewed and updated as " appropriate: allergies, current medications, past family history, past medical history, past social history, past surgical history and problem list   Past Medical History:   Diagnosis Date   • Anemia    • COVID 2021     Past Surgical History:   Procedure Laterality Date   • AUGMENTATION MAMMAPLASTY Bilateral     , saline   • AUGMENTATION MAMMAPLASTY Bilateral     implants redone 21   • COSMETIC SURGERY Bilateral     Breast implants   • [place] HYSTEROSCOPY BX ENDOMETRIUM&/POLYPC W/WO D&C N/A 2022    Procedure: D&C W/ HYSTEROSCOPY;  Surgeon: Cristal Ojeda MD;  [facility];  Service: Gynecology   • [place] HYSTEROSCOPY BX ENDOMETRIUM&/POLYPC W/WO D&C N/A 2022    Procedure: Endometrial Polypectomy;  Surgeon: Cristal Ojeda MD;  [facility];  Service: Gynecology     OB History    Para Term  AB Living   4 3 3   1 3   SAB IAB Ectopic Multiple Live Births   1       3      # Outcome Date GA Lbr Mina/2nd Weight Sex Delivery Anes PTL Lv   4 SAB 2017           3 Term      Vag-Spont   VENKAT   2 Term      Vag-Spont   VENKAT   1 Term      Vag-Spont   VENKAT       Current Outpatient Medications:   •  ferrous sulfate 325 (65 Fe) mg tablet, Take 325 mg by mouth daily with breakfast, Disp: , Rfl:   •  MAGNESIUM SULFATE PO, Take 1 tablet by mouth in the morning, Disp: , Rfl:   •  Nutritional Supplements (BALANCED NUTRITIONAL SHAKE PLS PO), Take by mouth in the morning, Disp: , Rfl:   •  TURMERIC PO, Take 1 tablet by mouth 2 (two) times a day, Disp: , Rfl:   No Known Allergies  Social History     Socioeconomic History   • Marital status: /Civil Union     Spouse name: None   • Number of children: 3   • Years of education: None   • Highest education level: None   Occupational History   • None   Tobacco Use   • Smoking status: Never   • Smokeless tobacco: Never   Vaping Use   • Vaping Use: Never used   Substance and Sexual Activity   • Alcohol use: Never   • Drug use: Never   • Sexual activity: Yes     Partners: Male   Other Topics Concern   • None   Social History Narrative   • None     Social Determinants of Health     Financial Resource Strain: Low Risk  (5/17/2021)    Overall Financial Resource Strain (CARDIA)    • Difficulty of Paying Living Expenses: Not hard at all   Food Insecurity: No Food Insecurity (5/17/2021)    Hunger Vital Sign    • Worried About Running Out of Food in the Last Year: Never true    • Ran Out of Food in the Last Year: Never true   Transportation Needs: No Transportation Needs (5/17/2021)    PRAPARE - Transportation    • Lack of Transportation (Medical): No    • Lack of Transportation (Non-Medical): No   Physical Activity: Inactive (5/17/2021)    Exercise Vital Sign    • Days of Exercise per Week: 0 days    • Minutes of Exercise per Session: 0 min   Stress: No Stress Concern Present (5/17/2021)    Henrry[address] Syed Puentes    • Feeling of Stress : Not at all   Social Connections:  Moderately Integrated (5/17/2021)    Social Connection and Isolation Panel [NHANES]    • Frequency of Communication with Friends and Family: More than three times a week    • Frequency of Social Gatherings with Friends and Family: More than three times a week    • Attends Oriental orthodox Services: More than 4 times per year    • Active Member of Clubs or Organizations: No    • Attends Club or Organization Meetings: Never    • Marital Status:    Intimate Partner Violence: Not At Risk (5/17/2021)    Humiliation, Afraid, Rape, and Kick questionnaire    • Fear of Current or Ex-Partner: No    • Emotionally Abused: No    • Physically Abused: No    • Sexually Abused: No   Housing Stability: Low Risk  (5/17/2021)    Housing Stability Vital Sign    • Unable to Pay for Housing in the Last Year: No    • Number of Places Lived in the Last Year: 1    • Unstable Housing in the Last Year: No     Family History   Problem Relation Age of Onset   • No Known "Problems Mother    • Hypertension Father    • Breast cancer Sister         52   • No Known Problems Daughter    • No Known Problems Maternal Grandmother    • No Known Problems Maternal Grandfather    • No Known Problems Paternal Grandmother    • No Known Problems Paternal Grandfather    • No Known Problems Daughter    • Breast cancer Maternal Aunt    • Stomach cancer Maternal Aunt        Review of Systems   Constitutional: Negative for chills, diaphoresis, fatigue and fever  Respiratory: Negative for apnea, cough, chest tightness, shortness of breath and wheezing  Cardiovascular: Negative for chest pain, palpitations and leg swelling  Gastrointestinal: Positive for abdominal pain  Negative for abdominal distention, anal bleeding, constipation, diarrhea, nausea, rectal pain and vomiting  Genitourinary: Negative for difficulty urinating, dyspareunia, dysuria, frequency, hematuria, menstrual problem, pelvic pain, urgency, vaginal bleeding, vaginal discharge and vaginal pain  Musculoskeletal: Negative for arthralgias, back pain and myalgias  Skin: Negative for color change and rash  Neurological: Negative for dizziness, syncope, light-headedness, numbness and headaches  Hematological: Negative for adenopathy  Does not bruise/bleed easily  Psychiatric/Behavioral: Negative for dysphoric mood and sleep disturbance  The patient is not nervous/anxious          Objective   Physical Exam  OBGyn Exam     Objective      /72 (BP Location: Right arm, Patient Position: Sitting, Cuff Size: Standard)   Ht 5' 3\" (1 6 m)   Wt 72 kg (158 lb 12 8 oz)   BMI 28 13 kg/m²     General:   alert and oriented, in no acute distress   Neck:    Breast:    Heart:    Lungs:    Abdomen:  Nontender   Vulva:    Vagina:    Cervix:    Uterus:    Adnexa:    Rectum:     Psych:  Normal mood and affect   Skin:  Without obvious lesions   Eyes: symmetric, with normal movements and reactivity   Musculoskeletal:  Normal muscle tone and " movements appreciated

## 2023-07-24 ENCOUNTER — OFFICE VISIT (OUTPATIENT)
Dept: INTERNAL MEDICINE CLINIC | Age: 48
End: 2023-07-24
Payer: COMMERCIAL

## 2023-07-24 VITALS
BODY MASS INDEX: 29.41 KG/M2 | WEIGHT: 166 LBS | HEART RATE: 62 BPM | DIASTOLIC BLOOD PRESSURE: 66 MMHG | SYSTOLIC BLOOD PRESSURE: 102 MMHG | TEMPERATURE: 97.5 F | OXYGEN SATURATION: 97 % | HEIGHT: 63 IN

## 2023-07-24 DIAGNOSIS — Z23 NEED FOR DIPHTHERIA-TETANUS-PERTUSSIS (TDAP) VACCINE: ICD-10-CM

## 2023-07-24 DIAGNOSIS — Z00.00 ANNUAL PHYSICAL EXAM: ICD-10-CM

## 2023-07-24 DIAGNOSIS — Z12.31 ENCOUNTER FOR SCREENING MAMMOGRAM FOR MALIGNANT NEOPLASM OF BREAST: Primary | ICD-10-CM

## 2023-07-24 PROBLEM — I83.93 SPIDER VEINS OF BOTH LOWER EXTREMITIES: Status: RESOLVED | Noted: 2022-09-06 | Resolved: 2023-07-24

## 2023-07-24 PROBLEM — J30.9 CHRONIC ALLERGIC RHINITIS: Status: RESOLVED | Noted: 2022-04-21 | Resolved: 2023-07-24

## 2023-07-24 PROBLEM — N80.03 ADENOMYOSIS: Status: RESOLVED | Noted: 2022-10-28 | Resolved: 2023-07-24

## 2023-07-24 PROBLEM — U07.1 COVID-19 VIRUS INFECTION: Status: RESOLVED | Noted: 2021-04-21 | Resolved: 2023-07-24

## 2023-07-24 PROBLEM — R10.9 ABDOMINAL PAIN: Status: RESOLVED | Noted: 2021-04-21 | Resolved: 2023-07-24

## 2023-07-24 PROCEDURE — 90715 TDAP VACCINE 7 YRS/> IM: CPT

## 2023-07-24 PROCEDURE — 90471 IMMUNIZATION ADMIN: CPT

## 2023-07-24 PROCEDURE — 99396 PREV VISIT EST AGE 40-64: CPT | Performed by: INTERNAL MEDICINE

## 2023-07-24 NOTE — PROGRESS NOTES
ADULT ANNUAL  [facility]    NAME: Anitha Hinjoosa  AGE: 50 y.o. SEX: female  : 1975     DATE: 2023     Assessment and Plan:     Problem List Items Addressed This Visit        Other    Annual physical exam - Primary    Relevant Orders    UA w Reflex to Microscopic w Reflex to Culture       Immunizations and preventive care screenings were discussed with patient today. Appropriate education was printed on patient's after visit summary. Counseling:  Alcohol/drug use: discussed moderation in alcohol intake, the recommendations for healthy alcohol use, and avoidance of illicit drug use. Dental Health: discussed importance of regular tooth brushing, flossing, and dental visits. Injury prevention: discussed safety/seat belts, safety helmets, smoke detectors, carbon dioxide detectors, and smoking near bedding or upholstery. Sexual health: discussed sexually transmitted diseases, partner selection, use of condoms, avoidance of unintended pregnancy, and contraceptive alternatives. · Exercise: the importance of regular exercise/physical activity was discussed. Recommend exercise 3-5 times per week for at least 30 minutes. No follow-ups on file. Chief Complaint:     No chief complaint on file. History of Present Illness:     Adult Annual Physical   Patient here for a comprehensive physical exam. The patient reports no problems. Hip pain shaking of the leg before her perod  Diet and Physical Activity  · Diet/Nutrition: well balanced diet, limited junk food, low calorie diet and low carb diet. · Exercise: walking.       Depression Screening  PHQ-2/9 Depression Screening    Little interest or pleasure in doing things: 0 - not at all  Feeling down, depressed, or hopeless: 0 - not at all  PHQ-2 Score: 0  PHQ-2 Interpretation: Negative depression screen       General Health  · Sleep: Frequent awakening because of the restless leg. · Hearing: decreased - left. · Vision: wears glasses. · Dental: no dental visits for >1 year. /GYN Health  · Patient is: premenopausal  · Last menstrual period: Beginning of the month  · Contraceptive method: none. Review of Systems:     Review of Systems   Constitutional: Positive for fatigue (Occasional or sometime fatigue). Negative for chills. HENT: Negative for congestion, ear pain, hearing loss, postnasal drip, sinus pressure, sore throat and voice change. Eyes: Negative for pain, discharge and visual disturbance. Respiratory: Negative for cough, chest tightness and shortness of breath. Cardiovascular: Negative for chest pain, palpitations and leg swelling. Gastrointestinal: Negative for abdominal pain, blood in stool, diarrhea, nausea and rectal pain. Genitourinary: Negative for difficulty urinating, dysuria and urgency. Musculoskeletal: Positive for arthralgias (Leg pain and restless leg at night). Negative for joint swelling. Skin: Negative for rash. Allergic/Immunologic: Negative for environmental allergies and food allergies. Neurological: Negative for dizziness, tremors, weakness, numbness and headaches. Hematological: Negative for adenopathy. Psychiatric/Behavioral: Negative for behavioral problems and hallucinations.       Past Medical History:     Past Medical History:   Diagnosis Date   • Anemia    • COVID 04/2021      Past Surgical History:     Past Surgical History:   Procedure Laterality Date   • AUGMENTATION MAMMAPLASTY Bilateral     2011, saline   • AUGMENTATION MAMMAPLASTY Bilateral     implants redone 6/30/21   • COSMETIC SURGERY Bilateral     Breast implants   • [place] HYSTEROSCOPY BX ENDOMETRIUM&/POLYPC W/WO D&C N/A 12/13/2022    Procedure: D&C W/ HYSTEROSCOPY;  Surgeon: Julienne Phelps MD;  Location: [facility] OR;  Service: Gynecology   • [place] HYSTEROSCOPY BX ENDOMETRIUM&/POLYPC W/WO D&C N/A 12/13/2022    Procedure: Endometrial Polypectomy;  Surgeon: Glenys Barrera MD;  [facility];  Service: Gynecology      Social History:     Social History     Socioeconomic History   • Marital status: /Civil Union     Spouse name: None   • Number of children: 3   • Years of education: None   • Highest education level: None   Occupational History   • None   Tobacco Use   • Smoking status: Never   • Smokeless tobacco: Never   Vaping Use   • Vaping Use: Never used   Substance and Sexual Activity   • Alcohol use: Never   • Drug use: Never   • Sexual activity: Yes     Partners: Male   Other Topics Concern   • None   Social History Narrative   • None     Social Determinants of Health     Financial Resource Strain: Low Risk  (5/17/2021)    Overall Financial Resource Strain (CARDIA)    • Difficulty of Paying Living Expenses: Not hard at all   Food Insecurity: No Food Insecurity (5/17/2021)    Hunger Vital Sign    • Worried About Running Out of Food in the Last Year: Never true    • Ran Out of Food in the Last Year: Never true   Transportation Needs: No Transportation Needs (5/17/2021)    PRAPARE - Transportation    • Lack of Transportation (Medical): No    • Lack of Transportation (Non-Medical): No   Physical Activity: Inactive (5/17/2021)    Exercise Vital Sign    • Days of Exercise per Week: 0 days    • Minutes of Exercise per Session: 0 min   Stress: No Stress Concern Present (5/17/2021)    [address] [facility]    • Feeling of Stress : Not at all   Social Connections:  Moderately Integrated (5/17/2021)    Social Connection and Isolation Panel [NHANES]    • Frequency of Communication with Friends and Family: More than three times a week    • Frequency of Social Gatherings with Friends and Family: More than three times a week    • Attends Christian Services: More than 4 times per year    • Active Member of Clubs or Organizations: No    • Attends Club or Organization Meetings: Never    • Marital Status:    Intimate Partner Violence: Not At Risk (5/17/2021)    Humiliation, Afraid, Rape, and Kick questionnaire    • Fear of Current or Ex-Partner: No    • Emotionally Abused: No    • Physically Abused: No    • Sexually Abused: No   Housing Stability: Low Risk  (5/17/2021)    Housing Stability Vital Sign    • Unable to Pay for Housing in the Last Year: No    • Number of Places Lived in the Last Year: 1    • Unstable Housing in the Last Year: No      Family History:     Family History   Problem Relation Age of Onset   • No Known Problems Mother    • Hypertension Father    • Breast cancer Sister         52   • No Known Problems Daughter    • No Known Problems Maternal Grandmother    • No Known Problems Maternal Grandfather    • No Known Problems Paternal Grandmother    • No Known Problems Paternal Grandfather    • No Known Problems Daughter    • Breast cancer Maternal Aunt    • Stomach cancer Maternal Aunt       Current Medications:     Current Outpatient Medications   Medication Sig Dispense Refill   • ferrous sulfate 325 (65 Fe) mg tablet Take 325 mg by mouth daily with breakfast     • MAGNESIUM SULFATE PO Take 1 tablet by mouth in the morning     • Nutritional Supplements (BALANCED NUTRITIONAL SHAKE PLS PO) Take by mouth in the morning     • TURMERIC PO Take 1 tablet by mouth 2 (two) times a day       No current facility-administered medications for this visit. Allergies:     No Known Allergies   Physical Exam:     /66 (BP Location: Left arm, Patient Position: Sitting, Cuff Size: Standard)   Pulse 62   Temp 97.5 °F (36.4 °C) (Temporal)   Ht 5' 3" (1.6 m)   Wt 75.3 kg (166 lb)   SpO2 97% Comment: room air  BMI 29.41 kg/m²     Physical Exam  Vitals and nursing note reviewed. Constitutional:       General: She is not in acute distress. Appearance: She is well-developed. HENT:      Head: Normocephalic and atraumatic. Right Ear:  There is impacted cerumen (I cleaned the wax with the curette and the Q-tip). Eyes:      Conjunctiva/sclera: Conjunctivae normal.   Cardiovascular:      Rate and Rhythm: Normal rate and regular rhythm. Heart sounds: No murmur heard. Pulmonary:      Effort: Pulmonary effort is normal. No respiratory distress. Breath sounds: Normal breath sounds. Abdominal:      Palpations: Abdomen is soft. Tenderness: There is no abdominal tenderness. Musculoskeletal:         General: No swelling. Cervical back: Neck supple. Skin:     General: Skin is warm and dry. Capillary Refill: Capillary refill takes less than 2 seconds. Neurological:      Mental Status: She is alert.    Psychiatric:         Mood and Affect: Mood normal.          Danny Weiner MD  [address] [facility]

## 2023-08-02 ENCOUNTER — APPOINTMENT (OUTPATIENT)
Dept: LAB | Age: 48
End: 2023-08-02
Payer: COMMERCIAL

## 2023-08-02 DIAGNOSIS — Z00.00 ANNUAL PHYSICAL EXAM: ICD-10-CM

## 2023-08-02 LAB
25(OH)D3 SERPL-MCNC: 21 NG/ML (ref 30–100)
ALBUMIN SERPL BCP-MCNC: 3.4 G/DL (ref 3.5–5)
ALP SERPL-CCNC: 65 U/L (ref 46–116)
ALT SERPL W P-5'-P-CCNC: 22 U/L (ref 12–78)
ANION GAP SERPL CALCULATED.3IONS-SCNC: 4 MMOL/L
AST SERPL W P-5'-P-CCNC: 16 U/L (ref 5–45)
BACTERIA UR QL AUTO: ABNORMAL /HPF
BILIRUB SERPL-MCNC: 0.35 MG/DL (ref 0.2–1)
BILIRUB UR QL STRIP: NEGATIVE
BUN SERPL-MCNC: 16 MG/DL (ref 5–25)
CALCIUM ALBUM COR SERPL-MCNC: 9.9 MG/DL (ref 8.3–10.1)
CALCIUM SERPL-MCNC: 9.4 MG/DL (ref 8.3–10.1)
CHLORIDE SERPL-SCNC: 108 MMOL/L (ref 96–108)
CHOLEST SERPL-MCNC: 226 MG/DL
CLARITY UR: CLEAR
CO2 SERPL-SCNC: 28 MMOL/L (ref 21–32)
COLOR UR: ABNORMAL
CREAT SERPL-MCNC: 0.78 MG/DL (ref 0.6–1.3)
ERYTHROCYTE [DISTWIDTH] IN BLOOD BY AUTOMATED COUNT: 14.6 % (ref 11.6–15.1)
EST. AVERAGE GLUCOSE BLD GHB EST-MCNC: 117 MG/DL
GFR SERPL CREATININE-BSD FRML MDRD: 89 ML/MIN/1.73SQ M
GLUCOSE P FAST SERPL-MCNC: 94 MG/DL (ref 65–99)
GLUCOSE UR STRIP-MCNC: NEGATIVE MG/DL
HBA1C MFR BLD: 5.7 %
HCT VFR BLD AUTO: 41.3 % (ref 34.8–46.1)
HDLC SERPL-MCNC: 52 MG/DL
HGB BLD-MCNC: 12.9 G/DL (ref 11.5–15.4)
HGB UR QL STRIP.AUTO: ABNORMAL
KETONES UR STRIP-MCNC: NEGATIVE MG/DL
LDLC SERPL CALC-MCNC: 153 MG/DL (ref 0–100)
LEUKOCYTE ESTERASE UR QL STRIP: NEGATIVE
MCH RBC QN AUTO: 26.5 PG (ref 26.8–34.3)
MCHC RBC AUTO-ENTMCNC: 31.2 G/DL (ref 31.4–37.4)
MCV RBC AUTO: 85 FL (ref 82–98)
MUCOUS THREADS UR QL AUTO: ABNORMAL
NITRITE UR QL STRIP: NEGATIVE
NON-SQ EPI CELLS URNS QL MICRO: ABNORMAL /HPF
NONHDLC SERPL-MCNC: 174 MG/DL
PH UR STRIP.AUTO: 6 [PH]
PLATELET # BLD AUTO: 252 THOUSANDS/UL (ref 149–390)
PMV BLD AUTO: 11.5 FL (ref 8.9–12.7)
POTASSIUM SERPL-SCNC: 4.4 MMOL/L (ref 3.5–5.3)
PROT SERPL-MCNC: 7.1 G/DL (ref 6.4–8.4)
PROT UR STRIP-MCNC: ABNORMAL MG/DL
RBC # BLD AUTO: 4.86 MILLION/UL (ref 3.81–5.12)
RBC #/AREA URNS AUTO: ABNORMAL /HPF
SODIUM SERPL-SCNC: 140 MMOL/L (ref 135–147)
SP GR UR STRIP.AUTO: 1.02 (ref 1–1.03)
TRIGL SERPL-MCNC: 107 MG/DL
TSH SERPL DL<=0.05 MIU/L-ACNC: 1.49 UIU/ML (ref 0.45–4.5)
UROBILINOGEN UR STRIP-ACNC: <2 MG/DL
WBC # BLD AUTO: 3.08 THOUSAND/UL (ref 4.31–10.16)
WBC #/AREA URNS AUTO: ABNORMAL /HPF

## 2023-08-02 PROCEDURE — 81001 URINALYSIS AUTO W/SCOPE: CPT | Performed by: INTERNAL MEDICINE

## 2023-08-02 PROCEDURE — 84443 ASSAY THYROID STIM HORMONE: CPT

## 2023-08-02 PROCEDURE — 82306 VITAMIN D 25 HYDROXY: CPT

## 2023-08-02 PROCEDURE — 80053 COMPREHEN METABOLIC PANEL: CPT

## 2023-08-02 PROCEDURE — 80061 LIPID PANEL: CPT

## 2023-08-02 PROCEDURE — 85027 COMPLETE CBC AUTOMATED: CPT

## 2023-08-02 PROCEDURE — 36415 COLL VENOUS BLD VENIPUNCTURE: CPT

## 2023-08-02 PROCEDURE — 83036 HEMOGLOBIN GLYCOSYLATED A1C: CPT

## 2023-10-20 ENCOUNTER — ANNUAL EXAM (OUTPATIENT)
Dept: GYNECOLOGY | Facility: CLINIC | Age: 48
End: 2023-10-20

## 2023-10-20 VITALS
WEIGHT: 160.4 LBS | DIASTOLIC BLOOD PRESSURE: 78 MMHG | HEIGHT: 64 IN | SYSTOLIC BLOOD PRESSURE: 120 MMHG | BODY MASS INDEX: 27.39 KG/M2

## 2023-10-20 DIAGNOSIS — M25.552 LEFT HIP PAIN: ICD-10-CM

## 2023-10-20 DIAGNOSIS — R10.32 LLQ PAIN: ICD-10-CM

## 2023-10-20 DIAGNOSIS — Z01.419 ENCOUNTER FOR GYNECOLOGICAL EXAMINATION WITHOUT ABNORMAL FINDING: Primary | ICD-10-CM

## 2023-10-20 DIAGNOSIS — Z12.31 ENCOUNTER FOR SCREENING MAMMOGRAM FOR BREAST CANCER: ICD-10-CM

## 2023-10-20 NOTE — PROGRESS NOTES
Assessment/Plan   Diagnoses and all orders for this visit:    Encounter for gynecological examination without abnormal finding    Encounter for screening mammogram for breast cancer  -     Mammo screening bilateral w 3d & cad; Future    LLQ pain    Left hip pain    1. the exam-Pap smear deferred, self breast awareness reviewed, calcium/vitamin D recommendations discussed, mammogram request given, colonoscopy up-to-date follow-up as per specialist.  2. left hip/lower quadrant discomfort-notes about a 3-month history of discomfort in the left hip. She does state that it is worsened prior to her menstrual cycles and then improves after the cycle is done. Exam is entirely benign today. She does have prior history of right ovarian cyst which resolved on ultrasound most recently June 2023. Discussed options. She will return in the near future for ultrasound and office visit with me to rule out any other adnexal cyst.  Also, suggested she might follow-up with her primary doctor regarding primary hip etiology. She does do a lot of standing on her job. She does go to the pool and does exercises with improvement. Physical therapy would also be an option. 3. history of menometrorrhagia/presumed endometrial polyp-status post hysteroscopy with D&C with planned polypectomy on 12/13/2022. Pathology was benign without definitive polyp. Patient has had much improved menses with lighter flow and resolved menometrorrhagia. She is very appreciative of this. 4.  Perimenopausal symptoms-denies any current complaints. 5.  Probable adenomyosis-noted on most recent ultrasounds. She denies menorrhagia or dysmenorrhea at this time. 6.  Contraception-condoms  7. 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 [employer]. Happy 48th birthday next week  Follow-up near future for ultrasound and office visit with me. Subjective   Patient ID: Pete Garza is a 50 y.o. female.     Vitals: 10/20/23 0702   BP: 120/78     Patient was seen today for yearly exam.  Please see assessment plan for details.         The following portions of the patient's history were reviewed and updated as appropriate: allergies, current medications, past family history, past medical history, past social history, past surgical history, and problem list.  Past Medical History:   Diagnosis Date    Anemia     COVID 2021     Past Surgical History:   Procedure Laterality Date    AUGMENTATION MAMMAPLASTY Bilateral     , saline    AUGMENTATION MAMMAPLASTY Bilateral     implants redone 21    COSMETIC SURGERY Bilateral     Breast implants    AL HYSTEROSCOPY BX ENDOMETRIUM&/POLYPC W/WO D&C N/A 2022    Procedure: D&C W/ HYSTEROSCOPY;  Surgeon: Angelia Roberts MD;  [facility];  Service: Gynecology    AL HYSTEROSCOPY BX ENDOMETRIUM&/POLYPC W/WO D&C N/A 2022    Procedure: Endometrial Polypectomy;  Surgeon: Angelia Roberts MD;  [facility];  Service: Gynecology     OB History    Para Term  AB Living   4 3 3   1 3   SAB IAB Ectopic Multiple Live Births   1       3      # Outcome Date GA Lbr Mina/2nd Weight Sex Delivery Anes PTL Lv   4 SAB 2017           3 Term      Vag-Spont   VENKAT   2 Term      Vag-Spont   VENKAT   1 Term      Vag-Spont   VENKAT       Current Outpatient Medications:     MAGNESIUM SULFATE PO, Take 1 tablet by mouth in the morning, Disp: , Rfl:     TURMERIC PO, Take 1 tablet by mouth 2 (two) times a day, Disp: , Rfl:     ferrous sulfate 325 (65 Fe) mg tablet, Take 325 mg by mouth daily with breakfast (Patient not taking: Reported on 10/20/2023), Disp: , Rfl:     Nutritional Supplements (BALANCED NUTRITIONAL SHAKE PLS PO), Take by mouth in the morning (Patient not taking: Reported on 10/20/2023), Disp: , Rfl:   No Known Allergies  Social History     Socioeconomic History    Marital status: /Civil Union     Spouse name: None    Number of children: 3    Years of education: None    Highest education level: None   Occupational History    None   Tobacco Use    Smoking status: Never    Smokeless tobacco: Never   Vaping Use    Vaping Use: Never used   Substance and Sexual Activity    Alcohol use: Never    Drug use: Never    Sexual activity: Yes     Partners: Male   Other Topics Concern    None   Social History Narrative    None     Social Determinants of Health     Financial Resource Strain: Low Risk  (5/17/2021)    Overall Financial Resource Strain (CARDIA)     Difficulty of Paying Living Expenses: Not hard at all   Food Insecurity: No Food Insecurity (5/17/2021)    Hunger Vital Sign     Worried About Running Out of Food in the Last Year: Never true     [address] in the Last Year: Never true   Transportation Needs: No Transportation Needs (5/17/2021)    PRAPARE - Transportation     Lack of Transportation (Medical): No     Lack of Transportation (Non-Medical): No   Physical Activity: Inactive (5/17/2021)    Exercise Vital Sign     Days of Exercise per Week: 0 days     Minutes of Exercise per Session: 0 min   Stress: No Stress Concern Present (5/17/2021)    [facility]     Feeling of Stress : Not at all   Social Connections:  Moderately Integrated (5/17/2021)    Social Connection and Isolation Panel [NHANES]     Frequency of Communication with Friends and Family: More than three times a week     Frequency of Social Gatherings with Friends and Family: More than three times a week     Attends Yazidism Services: More than 4 times per year     Active Member of [employer] or Organizations: No     Attends Club or Organization Meetings: Never     Marital Status:    Intimate Partner Violence: Not At Risk (5/17/2021)    Humiliation, Afraid, Rape, and Kick questionnaire     Fear of Current or Ex-Partner: No     Emotionally Abused: No     Physically Abused: No     Sexually Abused: No   Housing Stability: Low Risk (5/17/2021)    Housing Stability Vital Sign     Unable to Pay for Housing in the Last Year: No     Number of Places Lived in the Last Year: 1     Unstable Housing in the Last Year: No     Family History   Problem Relation Age of Onset    No Known Problems Mother     Hypertension Father     Breast cancer Sister         52    No Known Problems Daughter     No Known Problems Maternal Grandmother     No Known Problems Maternal Grandfather     No Known Problems Paternal Grandmother     No Known Problems Paternal Grandfather     No Known Problems Daughter     Breast cancer Maternal Aunt     Stomach cancer Maternal Aunt        Review of Systems   Constitutional:  Negative for chills, diaphoresis, fatigue and fever. Respiratory:  Negative for apnea, cough, chest tightness, shortness of breath and wheezing. Cardiovascular:  Negative for chest pain, palpitations and leg swelling. Gastrointestinal:  Negative for abdominal distention, abdominal pain, anal bleeding, constipation, diarrhea, nausea, rectal pain and vomiting. Genitourinary:  Negative for difficulty urinating, dyspareunia, dysuria, frequency, hematuria, menstrual problem, pelvic pain, urgency, vaginal bleeding, vaginal discharge and vaginal pain. Musculoskeletal:  Negative for arthralgias, back pain and myalgias. Skin:  Negative for color change and rash. Neurological:  Negative for dizziness, syncope, light-headedness, numbness and headaches. Hematological:  Negative for adenopathy. Does not bruise/bleed easily. Psychiatric/Behavioral:  Negative for dysphoric mood and sleep disturbance. The patient is not nervous/anxious.         Objective   Physical Exam  OBGyn Exam     Objective      /78 (BP Location: Right arm, Patient Position: Sitting)   Ht 5' 4" (1.626 m)   Wt 72.8 kg (160 lb 6.4 oz)   LMP 10/16/2023   BMI 27.53 kg/m²     General:   alert and oriented, in no acute distress   Neck: normal to inspection and palpation   Breast: normal appearance, no masses or tenderness   Heart:    Lungs:    Abdomen: soft, non-tender, without masses or organomegaly   Vulva: normal   Vagina: Without erythema or lesions or discharge. Small amount of menstrual blood   Cervix: Without lesions or discharge or cervicitis. No Cervical motion tenderness.   Small amount of menstrual blood   Uterus: top normal size, anteverted, non-tender   Adnexa: no mass, fullness, tenderness   Rectum: negative    Psych:  Normal mood and affect   Skin:  Without obvious lesions   Eyes: symmetric, with normal movements and reactivity   Musculoskeletal:  Normal muscle tone and movements appreciated

## 2023-11-30 ENCOUNTER — OFFICE VISIT (OUTPATIENT)
Dept: GYNECOLOGY | Facility: CLINIC | Age: 48
End: 2023-11-30
Payer: COMMERCIAL

## 2023-11-30 ENCOUNTER — ULTRASOUND (OUTPATIENT)
Dept: GYNECOLOGY | Facility: CLINIC | Age: 48
End: 2023-11-30
Payer: COMMERCIAL

## 2023-11-30 VITALS — WEIGHT: 166.4 LBS | BODY MASS INDEX: 28.56 KG/M2 | SYSTOLIC BLOOD PRESSURE: 118 MMHG | DIASTOLIC BLOOD PRESSURE: 64 MMHG

## 2023-11-30 DIAGNOSIS — N83.201 CYST OF RIGHT OVARY: ICD-10-CM

## 2023-11-30 DIAGNOSIS — R10.32 LLQ PAIN: Primary | ICD-10-CM

## 2023-11-30 DIAGNOSIS — N92.1 MENOMETRORRHAGIA: Primary | ICD-10-CM

## 2023-11-30 DIAGNOSIS — N94.6 DYSMENORRHEA: ICD-10-CM

## 2023-11-30 DIAGNOSIS — N80.03 ADENOMYOSIS: ICD-10-CM

## 2023-11-30 PROCEDURE — 76830 TRANSVAGINAL US NON-OB: CPT | Performed by: OBSTETRICS & GYNECOLOGY

## 2023-11-30 PROCEDURE — 99214 OFFICE O/P EST MOD 30 MIN: CPT | Performed by: OBSTETRICS & GYNECOLOGY

## 2023-11-30 RX ORDER — NAPROXEN SODIUM 550 MG/1
550 TABLET ORAL 2 TIMES DAILY PRN
Qty: 30 TABLET | Refills: 1 | Status: SHIPPED | OUTPATIENT
Start: 2023-11-30

## 2023-11-30 NOTE — PROGRESS NOTES
Assessment/Plan   Diagnoses and all orders for this visit:    Menometrorrhagia    Cyst of right ovary    Adenomyosis    Dysmenorrhea  -     naproxen sodium (ANAPROX) 550 mg tablet; Take 1 tablet (550 mg total) by mouth 2 (two) times a day as needed for mild pain    1. right ovarian cyst-noted on ultrasound today, 4.2 cm simple cyst.  Patient is counseled out that finding. Given the simple nature, it is unlikely to be with any significant pathology finding. She does note intermittent back pain which could be related to the cyst.  However, the back pain appears to be mostly with menses so more likely represents dysmenorrhea. To follow-up in 6 to 8 weeks time for ultrasound with sonohysterogram or as needed. 2. left hip/lower quadrant discomfort-denies any current left-sided discomfort at this time. 3. menometrorrhagia-had hysteroscopy with D&C 12/13/2022 for this finding and presumed endometrial polyp. At [facility] , no definite polyp was seen. Her menstrual cycles were much improved until this past month when she noted 3 episodes of bleeding over the past month. Each lasted about 7 days, off for a week or 2 and then recurred. Given this, she will return in the near future for sonohysterogram with endometrial biopsy. TSH and CBC were normal from August 2023. She denies pregnancy, to check urine pregnancy test at next visit. 4. perimenopausal symptoms-denies any current complaints  5. adenomyosis-suggested on ultrasound today and also previous ultrasounds. This was written down for her and she will research it. Likely, it is contributing to her bleeding issues and dysmenorrhea. We did briefly discuss treatment options including OCP, progesterone, IUD, endometrial ablation, Depo-Provera, and even hysterectomy. Will discuss further at follow-up visit. 6.  Dysmenorrhea-does note severe back pain with menstrual bleeding. She does take Tylenol with some improvement. Suggested ibuprofen or naproxen.   She is interested in prescription for naproxen, sent to pharmacy. 7.  Contraception-condoms  8. Other-continues as a  in [facility] with [employer] with [employer] and [employer]. Follow-up 6 to 8 weeks time for ultrasound with sonohysterogram/endometrial biopsy or as needed. Subjective   Patient ID: Larry De Jesus is a 46 y.o. female. Vitals:    23 1125   BP: 118/64     Patient was seen today for ultrasound follow-up office visit with me. Please see assessment plan for details.         The following portions of the patient's history were reviewed and updated as appropriate: allergies, current medications, past family history, past medical history, past social history, past surgical history, and problem list.  Past Medical History:   Diagnosis Date    Anemia     COVID 2021     Past Surgical History:   Procedure Laterality Date    AUGMENTATION MAMMAPLASTY Bilateral     , saline    AUGMENTATION MAMMAPLASTY Bilateral     implants redone 21    COSMETIC SURGERY Bilateral     Breast implants    [place] HYSTEROSCOPY BX ENDOMETRIUM&/POLYPC W/WO D&C N/A 2022    Procedure: D&C W/ HYSTEROSCOPY;  Surgeon: Wil Howell MD;  [facility];  Service: Gynecology    [place] HYSTEROSCOPY BX ENDOMETRIUM&/POLYPC W/WO D&C N/A 2022    Procedure: Endometrial Polypectomy;  Surgeon: Wil Howell MD;  [facility];  Service: Gynecology     OB History    Para Term  AB Living   4 3 3   1 3   SAB IAB Ectopic Multiple Live Births   1       3      # Outcome Date GA Lbr Mina/2nd Weight Sex Delivery Anes PTL Lv   4 SAB 2017           3 Term      Vag-Spont   VENKAT   2 Term      Vag-Spont   VENKAT   1 Term      Vag-Spont   VENKAT       Current Outpatient Medications:     MAGNESIUM SULFATE PO, Take 1 tablet by mouth in the morning, Disp: , Rfl:     naproxen sodium (ANAPROX) 550 mg tablet, Take 1 tablet (550 mg total) by mouth 2 (two) times a day as needed for mild pain, Disp: 30 tablet, Rfl: 1    TURMERIC PO, Take 1 tablet by mouth 2 (two) times a day, Disp: , Rfl:     ferrous sulfate 325 (65 Fe) mg tablet, Take 325 mg by mouth daily with breakfast (Patient not taking: Reported on 10/20/2023), Disp: , Rfl:     Nutritional Supplements (BALANCED NUTRITIONAL SHAKE PLS PO), Take by mouth in the morning (Patient not taking: Reported on 10/20/2023), Disp: , Rfl:   No Known Allergies  Social History     Socioeconomic History    Marital status: /Civil Union     Spouse name: None    Number of children: 3    Years of education: None    Highest education level: None   Occupational History    None   Tobacco Use    Smoking status: Never    Smokeless tobacco: Never   Vaping Use    Vaping Use: Never used   Substance and Sexual Activity    Alcohol use: Never    Drug use: Never    Sexual activity: Yes     Partners: Male   Other Topics Concern    None   Social History Narrative    None     Social Determinants of Health     Financial Resource Strain: Low Risk  (5/17/2021)    Overall Financial Resource Strain (CARDIA)     Difficulty of Paying Living Expenses: Not hard at all   Food Insecurity: No Food Insecurity (5/17/2021)    Hunger Vital Sign     Worried About Running Out of Food in the Last Year: Never true     Ran Out of Food in the Last Year: Never true   Transportation Needs: No Transportation Needs (5/17/2021)    PRAPARE - Transportation     Lack of Transportation (Medical): No     Lack of Transportation (Non-Medical): No   Physical Activity: Inactive (5/17/2021)    Exercise Vital Sign     Days of Exercise per Week: 0 days     Minutes of Exercise per Session: 0 min   Stress: No Stress Concern Present (5/17/2021)    [facility]     Feeling of Stress : Not at all   Social Connections:  Moderately Integrated (5/17/2021)    Social Connection and Isolation Panel [NHANES]     Frequency of Communication with Friends and Family: More than three times a week     Frequency of Social Gatherings with Friends and Family: More than three times a week     Attends Sabianism Services: More than 4 times per year     Active Member of [employer] or Organizations: No     Attends Club or Organization Meetings: Never     Marital Status:    Intimate Partner Violence: Not At Risk (5/17/2021)    Humiliation, Afraid, Rape, and Kick questionnaire     Fear of Current or Ex-Partner: No     Emotionally Abused: No     Physically Abused: No     Sexually Abused: No   Housing Stability: Low Risk  (5/17/2021)    Housing Stability Vital Sign     Unable to Pay for Housing in the Last Year: No     Number of [address] in the Last Year: 1     Unstable Housing in the Last Year: No     Family History   Problem Relation Age of Onset    No Known Problems Mother     Hypertension Father     Breast cancer Sister         52    No Known Problems Daughter     No Known Problems Maternal Grandmother     No Known Problems Maternal Grandfather     No Known Problems Paternal Grandmother     No Known Problems Paternal Grandfather     No Known Problems Daughter     Breast cancer Maternal Aunt     Stomach cancer Maternal Aunt        Review of Systems   Constitutional:  Negative for chills, diaphoresis, fatigue and fever. Respiratory:  Negative for apnea, cough, chest tightness, shortness of breath and wheezing. Cardiovascular:  Negative for chest pain, palpitations and leg swelling. Gastrointestinal:  Negative for abdominal distention, abdominal pain, anal bleeding, constipation, diarrhea, nausea, rectal pain and vomiting. Genitourinary:  Positive for menstrual problem. Negative for difficulty urinating, dyspareunia, dysuria, frequency, hematuria, pelvic pain, urgency, vaginal bleeding, vaginal discharge and vaginal pain. Musculoskeletal:  Negative for arthralgias, back pain and myalgias. Skin:  Negative for color change and rash.    Neurological:  Negative for dizziness, syncope, light-headedness, numbness and headaches. Hematological:  Negative for adenopathy. Does not bruise/bleed easily. Psychiatric/Behavioral:  Negative for dysphoric mood and sleep disturbance. The patient is not nervous/anxious.     Dysmenorrhea    Objective   Physical Exam  OBGyn Exam     Objective      /64 (BP Location: Left arm, Patient Position: Sitting)   Wt 75.5 kg (166 lb 6.4 oz)   BMI 28.56 kg/m²     General:   alert and oriented, in no acute distress   Neck:    Breast:    Heart:    Lungs:    Abdomen: soft, non-tender, without masses or organomegaly   Vulva:    Vagina:    Cervix:    Uterus:    Adnexa:    Rectum:     Psych:  Normal mood and affect   Skin:  Without obvious lesions   Eyes: symmetric, with normal movements and reactivity   Musculoskeletal:  Normal muscle tone and movements appreciated

## 2023-11-30 NOTE — PROGRESS NOTES
AMB US Pelvic Non OB    Date/Time: 11/30/2023 11:20 AM    Performed by: Maciej   Authorized by: Janice Reyes MD  Universal Protocol:  Consent given by: patient  Patient identity confirmed: verbally with patient    Procedure details:     Technique:  Transvaginal US, Non-OB    Position: lithotomy exam    Uterine findings:     Length (cm): 9.55    Height (cm):  6.97    Width (cm):  9.55    Endometrial stripe: identified      Endometrium thickness (mm):  15.2  Left ovary findings:     Left ovary:  Visualized    Length (cm): 3.72    Height (cm): 1.98    Width (cm): 2.11  Right ovary findings:     Right ovary:  Visualized    Length (cm): 6.56    Height (cm): 3.89    Width (cm): 4.05  Other findings:     Free pelvic fluid: not identified      Free peritoneal fluid: not identified    Post-Procedure Details:     Impression:  Retroverted uterus is inhomogeneous throughout without fibroids. Low level echogenicities are again present throughout the myometrium suggestive of adenomyosis. There are nabothian cysts present in cervix. The left ovary appears within normal limits. The right ovary demonstrates a 4.2cm simple cyst. Small amount of free fluid is noted in the cul de sac. Tolerance: Tolerated well, no immediate complications    Complications: no complications    Additional Procedure Comments:      Geofusion F8 E8C-RS transvaginal transducer Serial # [id number] was used to perform the examination today and subsequently followed with high level disinfection utilizing Trophon EPR procedure. Ultrasound performed at:     [facility]  [address] [facility]  Phone:  [phone]  Fax:  [phone]

## 2024-01-24 NOTE — PROGRESS NOTES
AMB US Pelvic Non OB    Date/Time: 1/25/2024 7:40 AM    Performed by: Kenya Magallanes  Authorized by: Collin Jarquin MD  Universal Protocol:  Consent: Verbal consent obtained.  Consent given by: patient  Patient understanding: patient states understanding of the procedure being performed  Patient identity confirmed: verbally with patient    Procedure details:     SIS Procedure: Yes    Indications: non-obstetric vaginal bleeding      Technique:  Transvaginal US, Non-OB    Position: lithotomy exam    Uterine findings:     Length (cm): 10.72    Height (cm):  7.28    Width (cm):  9.11    Endometrial stripe: identified      Endometrium thickness (mm):  16  Left ovary findings:     Left ovary:  Visualized    Length (cm): 3.23    Height (cm): 1.71    Width (cm): 1.86  Right ovary findings:     Right ovary:  Visualized    Length (cm): 3.5    Height (cm): 2.07    Width (cm): 2.19  Other findings:     Free pelvic fluid: not identified      Free peritoneal fluid: not identified    Post-Procedure Details:     Impression:  Retroverted uterus is inhomogeneous throughout without fibroids. Low level echogenicities are again present throughout the myometrium suggestive of adenomyosis.There are nabothian cysts present in cervix. Bilateral ovaries appear within normal limits.     Tolerance:  Tolerated well, no immediate complications    Complications: no complications    Additional Procedure Comments:      COMMUN[employer]RA[employer]VESTMENTS F8 E8C-RS transvaginal transducer Serial # [id number] was used to perform the examination today and subsequently followed with high level disinfection utilizing Trophon EPR procedure.       Ultrasound performed at:     [facility]  [facility] [address] [address]  Phone:  [phone]  Fax:  [phone]    Sonohysterogram    Date/Time: 1/25/2024 7:40 AM    Performed by: Collin Jarquin MD  Authorized by: Collin Jarquin MD  Universal Protocol:  Consent: Verbal consent obtained.  Consent given by: patient  Patient  understanding: patient states understanding of the procedure being performed  Patient identity confirmed: verbally with patient    Pre-procedure:     Prepped with: Hibiclens    Procedure:     Cervix cleaned and prepped: yes      Tenaculum applied to cervix: yes      Uterus sounded: yes      Catheter inserted: yes      Uterine cavity distended with saline: yes    Post-procedure:     Patient observed: yes      Post procedure instructions given to patient: yes      Patient tolerated procedure well with no complications: yes    Comments:      Sonohysterogram demonstrates multiple filling defects, likely polyps.  Endometrial biopsy    Date/Time: 1/25/2024 7:40 AM    Performed by: Collin Jarquin MD  Authorized by: Collin Jarquin MD  Universal Protocol:  Consent given by: patient  Patient understanding: patient states understanding of the procedure being performed  Patient identity confirmed: verbally with patient    Procedure:     Procedure: endometrial biopsy with Pipelle      A bivalve speculum was placed in the vagina: yes      Cervix cleaned and prepped: yes      Specimen collected: specimen collected and sent to pathology      Patient tolerated procedure well with no complications: yes

## 2024-01-25 ENCOUNTER — PROCEDURE VISIT (OUTPATIENT)
Dept: GYNECOLOGY | Facility: CLINIC | Age: 49
End: 2024-01-25

## 2024-01-25 ENCOUNTER — ULTRASOUND (OUTPATIENT)
Dept: GYNECOLOGY | Facility: CLINIC | Age: 49
End: 2024-01-25

## 2024-01-25 DIAGNOSIS — N80.03 ADENOMYOSIS: ICD-10-CM

## 2024-01-25 DIAGNOSIS — N84.0 ENDOMETRIAL POLYP: ICD-10-CM

## 2024-01-25 DIAGNOSIS — N83.201 CYST OF RIGHT OVARY: ICD-10-CM

## 2024-01-25 DIAGNOSIS — N94.6 DYSMENORRHEA: ICD-10-CM

## 2024-01-25 DIAGNOSIS — N92.1 MENOMETRORRHAGIA: Primary | ICD-10-CM

## 2024-01-25 DIAGNOSIS — N93.9 ABNORMAL UTERINE BLEEDING (AUB): Primary | ICD-10-CM

## 2024-01-25 PROCEDURE — 88342 IMHCHEM/IMCYTCHM 1ST ANTB: CPT | Performed by: SPECIALIST

## 2024-01-25 PROCEDURE — 88305 TISSUE EXAM BY PATHOLOGIST: CPT | Performed by: SPECIALIST

## 2024-01-25 NOTE — PROGRESS NOTES
Assessment/Plan   Diagnoses and all orders for this visit:    Menometrorrhagia    Cyst of right ovary    Endometrial polyp    Dysmenorrhea    Adenomyosis    1. menometrorrhagia-status post D&C 12/13/2022 for this indication and presumed endometrial polyp.  No definite polyp was seen.  She had improved menses until November 2023 when she began to have heavy bleeding with 3.'s over 1 month timeframe.  On 1/2/2024, had extremely heavy bleeding with severe cramps.  Sonohysterogram today demonstrates endometrial polyp with adenomyosis.  Endometrial biopsy was done and we will inform her of the findings.  Reviewed medical options.  Given the polyp, would recommend hysteroscopy with D&C, could consider endometrial ablation as well.  Patient also counseled about medical options and hysterectomy as well given the adenomyosis and long history of heavy bleeding with cramps.  She wishes to proceed with hysterectomy.  Counseled about laparoscopic total hysterectomy with possible BSO, possible bladder cystoscopy.  Also counseled about vaginal hysterectomy.  Risk and benefits of the procedure were discussed in detail and all questions were answered.  Consent was signed, and the patient was scheduled for the surgery in the near future.  She had numerous questions about risks and activity instructions and they were reviewed in detail.  2.  Resolved right ovarian cyst-4.2 cm simple cyst right ovary has resolved on ultrasound today.  Patient was relieved to hear this.  3.  History of left hip/left lower quadrant discomfort-denies any complaints.  4.  Perimenopausal symptoms-denies any current complaints.  5.  Adenomyosis-has been suggested on numerous ultrasounds and again today.  Likely this is significantly contributing to her symptoms.  To proceed with surgery as noted above.  6. dysmenorrhea-related to adenomyosis.  Recommend ibuprofen or naproxen as needed.  7. Contraception-condoms  8.  Other-continues as  in [place] with  her restaurant, [employer] with Mexican and [place] food  Follow-up 2 weeks post procedure.    Subjective   Patient ID: Kandy Stone is a 48 y.o. female.    There were no vitals filed for this visit.  Patient was seen today for sonohysterogram with endometrial biopsy.  Please see assessment and plan in procedure note for details.      The following portions of the patient's history were reviewed and updated as appropriate: allergies, current medications, past family history, past medical history, past social history, past surgical history, and problem list.  Past Medical History:   Diagnosis Date    Anemia     COVID 2021     Past Surgical History:   Procedure Laterality Date    AUGMENTATION MAMMAPLASTY Bilateral     , saline    AUGMENTATION MAMMAPLASTY Bilateral     implants redone 19    COSMETIC SURGERY Bilateral     Breast implants    [place] HYSTEROSCOPY BX ENDOMETRIUM&/POLYPC W/WO D&C N/A 2022    Procedure: D&C W/ HYSTEROSCOPY;  Surgeon: Collin Jarquin MD;  Location: [facility];  Service: Gynecology    [place] HYSTEROSCOPY BX ENDOMETRIUM&/POLYPC W/WO D&C N/A 2022    Procedure: Endometrial Polypectomy;  Surgeon: Collin Jarquin MD;  Location: [facility];  Service: Gynecology     OB History    Para Term  AB Living   4 3 3   1 3   SAB IAB Ectopic Multiple Live Births   1       3      # Outcome Date GA Lbr [place]ina/2nd Weight Sex Delivery Anes PTL Lv   4 SAB 2017           3 Term      Vag-Spont   VENKAT   2 Term      Vag-Spont   VENKAT   1 Term      Vag-Spont   VENKAT       Current Outpatient Medications:     ferrous sulfate 325 (65 Fe) mg tablet, Take 325 mg by mouth daily with breakfast (Patient not taking: Reported on 10/20/2023), Disp: , Rfl:     MAGNESIUM SULFATE PO, Take 1 tablet by mouth in the morning, Disp: , Rfl:     naproxen sodium (ANAPROX) 550 mg tablet, Take 1 tablet (550 mg total) by mouth 2 (two) times a day as needed for mild pain, Disp: 30 tablet, Rfl: 1    Nutritional  Supplements (BALANCED NUTRITIONAL SHAKE PLS PO), Take by mouth in the morning (Patient not taking: Reported on 10/20/2023), Disp: , Rfl:     TURMERIC PO, Take 1 tablet by mouth 2 (two) times a day, Disp: , Rfl:   No Known Allergies  Social History     Socioeconomic History    Marital status: /Civil Union     Spouse name: Not on file    Number of children: 3    Years of education: Not on file    Highest education level: Not on file   Occupational History    Not on file   Tobacco Use    Smoking status: Never    Smokeless tobacco: Never   Vaping Use    Vaping status: Never Used   Substance and Sexual Activity    Alcohol use: Never    Drug use: Never    Sexual activity: Yes     Partners: Male   Other Topics Concern    Not on file   Social History Narrative    Not on file     Social Determinants of Health     Financial Resource Strain: Low Risk  (5/17/2021)    Overall Financial Resource Strain (CARDIA)     Difficulty of Paying Living Expenses: Not hard at all   Food Insecurity: No Food Insecurity (5/17/2021)    Hunger Vital Sign     Worried About Running Out of Food in the Last Year: Never true     Ran Out of Food in the Last Year: Never true   Transportation Needs: No Transportation Needs (5/17/2021)    PRAPARE - Transportation     Lack of Transportation (Medical): No     Lack of Transportation (Non-Medical): No   Physical Activity: Inactive (5/17/2021)    Exercise Vital Sign     Days of Exercise per Week: 0 days     Minutes of Exercise per Session: 0 min   Stress: No Stress Concern Present (5/17/2021)    [employer] - Occupational Stress Questionnaire     Feeling of Stress : Not at all   Social Connections: Moderately Integrated (5/17/2021)    Social Connection and Isolation Panel [NHANES]     Frequency of Communication with Friends and Family: More than three times a week     Frequency of Social Gatherings with Friends and Family: More than three times a week     Attends Anabaptism  Services: More than 4 times per year     Active Member of Clubs or Organizations: No     Attends Club or Organization Meetings: Never     Marital Status:    Intimate Partner Violence: Not At Risk (5/17/2021)    Humiliation, Afraid, Rape, and Kick questionnaire     Fear of Current or Ex-Partner: No     Emotionally Abused: No     Physically Abused: No     Sexually Abused: No   Housing Stability: Low Risk  (5/17/2021)    Housing Stability Vital Sign     Unable to Pay for Housing in the Last Year: No     Number of Places Lived in the Last Year: 1     Unstable Housing in the Last Year: No     Family History   Problem Relation Age of Onset    No Known Problems Mother     Hypertension Father     Breast cancer Sister         47    No Known Problems Daughter     No Known Problems Maternal Grandmother     No Known Problems Maternal Grandfather     No Known Problems Paternal Grandmother     No Known Problems Paternal Grandfather     No Known Problems Daughter     Breast cancer Maternal Aunt     Stomach cancer Maternal Aunt        Review of Systems   Constitutional:  Negative for chills, diaphoresis, fatigue and fever.   Respiratory:  Negative for apnea, cough, chest tightness, shortness of breath and wheezing.    Cardiovascular:  Negative for chest pain, palpitations and leg swelling.   Gastrointestinal:  Negative for abdominal distention, abdominal pain, anal bleeding, constipation, diarrhea, nausea, rectal pain and vomiting.   Genitourinary:  Positive for menstrual problem. Negative for difficulty urinating, dyspareunia, dysuria, frequency, hematuria, pelvic pain, urgency, vaginal bleeding, vaginal discharge and vaginal pain.   Musculoskeletal:  Negative for arthralgias, back pain and myalgias.   Skin:  Negative for color change and rash.   Neurological:  Negative for dizziness, syncope, light-headedness, numbness and headaches.   Hematological:  Negative for adenopathy. Does not bruise/bleed easily.    Psychiatric/Behavioral:  Negative for dysphoric mood and sleep disturbance. The patient is not nervous/anxious.        Objective   Physical Exam  OBGyn Exam     Objective      There were no vitals taken for this visit.    General:   alert and oriented, in no acute distress   Neck:    Breast:    Heart: regular rate and rhythm, S1, S2 normal, no murmur, click, rub or gallop   Lungs: clear to auscultation bilaterally   Abdomen: soft, non-tender, without masses or organomegaly   Vulva: normal   Vagina: Without erythema or lesions or discharge.  Normal   Cervix: Without lesions or discharge or cervicitis.  No Cervical motion tenderness   Uterus: top normal size, retroverted, non-tender   Adnexa: no mass, fullness, tenderness   Rectum: deferred    Psych:  Normal mood and affect   Skin:  Without obvious lesions   Eyes: symmetric, with normal movements and reactivity   Musculoskeletal:  Normal muscle tone and movements appreciated

## 2024-01-25 NOTE — PATIENT INSTRUCTIONS
No outpatient medications have been marked as taking for the 1/25/24 encounter (Procedure visit) with Collin Jarquin MD.

## 2024-01-31 PROCEDURE — 88305 TISSUE EXAM BY PATHOLOGIST: CPT | Performed by: SPECIALIST

## 2024-01-31 PROCEDURE — 88342 IMHCHEM/IMCYTCHM 1ST ANTB: CPT | Performed by: SPECIALIST

## 2024-01-31 NOTE — RESULT ENCOUNTER NOTE
Endometrial biopsy is benign, please inform the patient.  Okay to proceed with hysterectomy as scheduled.

## 2024-02-06 ENCOUNTER — TELEPHONE (OUTPATIENT)
Dept: GYNECOLOGY | Facility: CLINIC | Age: 49
End: 2024-02-06

## 2024-02-06 NOTE — TELEPHONE ENCOUNTER
Received a message from Manuel that pt wants to cancel her surgery for March 5, 2024 with Dr Jarquin.. Left pt a message to see if she just wsants to change the date or if she wants to cancel her surgery completely.. Will await for pt to return call

## 2024-10-22 ENCOUNTER — OFFICE VISIT (OUTPATIENT)
Dept: INTERNAL MEDICINE CLINIC | Age: 49
End: 2024-10-22
Payer: COMMERCIAL

## 2024-10-22 VITALS
HEIGHT: 64 IN | WEIGHT: 177 LBS | SYSTOLIC BLOOD PRESSURE: 110 MMHG | BODY MASS INDEX: 30.22 KG/M2 | TEMPERATURE: 97.5 F | OXYGEN SATURATION: 96 % | DIASTOLIC BLOOD PRESSURE: 78 MMHG | HEART RATE: 72 BPM

## 2024-10-22 DIAGNOSIS — Z12.31 ENCOUNTER FOR SCREENING MAMMOGRAM FOR MALIGNANT NEOPLASM OF BREAST: Primary | ICD-10-CM

## 2024-10-22 DIAGNOSIS — Z00.00 ANNUAL PHYSICAL EXAM: ICD-10-CM

## 2024-10-22 PROCEDURE — 99396 PREV VISIT EST AGE 40-64: CPT | Performed by: INTERNAL MEDICINE

## 2024-10-22 RX ORDER — DIPHENOXYLATE HYDROCHLORIDE AND ATROPINE SULFATE 2.5; .025 MG/1; MG/1
1 TABLET ORAL DAILY
COMMUNITY

## 2024-10-22 NOTE — ASSESSMENT & PLAN NOTE
Orders:    UA w Reflex to Microscopic w Reflex to Culture; Future    Lipid Panel with Direct LDL reflex; Future    Hemoglobin A1C; Future    CBC and Platelet; Future    Comprehensive metabolic panel; Future    TSH, 3rd generation with Free T4 reflex; Future    Vitamin D 25 hydroxy; Future

## 2024-10-22 NOTE — PATIENT INSTRUCTIONS
"Patient Education     Routine physical for adults   The Basics   Written by the doctors and editors at [facility]   What is a physical? -- A physical is a routine visit, or \"check-up,\" with your doctor. You might also hear it called a \"wellness visit\" or \"preventive visit.\"  During each visit, the doctor will:   Ask about your physical and mental health   Ask about your habits, behaviors, and lifestyle   Do an exam   Give you vaccines if needed   Talk to you about any medicines you take   Give advice about your health   Answer your questions  Getting regular check-ups is an important part of taking care of your health. It can help your doctor find and treat any problems you have. But it's also important for preventing health problems.  A routine physical is different from a \"sick visit.\" A sick visit is when you see a doctor because of a health concern or problem. Since physicals are scheduled ahead of time, you can think about what you want to ask the doctor.  How often should I get a physical? -- It depends on your age and health. In general, for people age 21 years and older:   If you are younger than 50 years, you might be able to get a physical every 3 years.   If you are 50 years or older, your doctor might recommend a physical every year.  If you have an ongoing health condition, like diabetes or high blood pressure, your doctor will probably want to see you more often.  What happens during a physical? -- In general, each visit will include:   Physical exam - The doctor or nurse will check your height, weight, heart rate, and blood pressure. They will also look at your eyes and ears. They will ask about how you are feeling and whether you have any symptoms that bother you.   Medicines - It's a good idea to bring a list of all the medicines you take to each doctor visit. Your doctor will talk to you about your medicines and answer any questions. Tell them if you are having any side effects that bother you. You " "should also tell them if you are having trouble paying for any of your medicines.   Habits and behaviors - This includes:   Your diet   Your exercise habits   Whether you smoke, drink alcohol, or use drugs   Whether you are sexually active   Whether you feel safe at home  Your doctor will talk to you about things you can do to improve your health and lower your risk of health problems. They will also offer help and support. For example, if you want to quit smoking, they can give you advice and might prescribe medicines. If you want to improve your diet or get more physical activity, they can help you with this, too.   Lab tests, if needed - The tests you get will depend on your age and situation. For example, your doctor might want to check your:   Cholesterol   Blood sugar   Iron level   Vaccines - The recommended vaccines will depend on your age, health, and what vaccines you already had. Vaccines are very important because they can prevent certain serious or deadly infections.   Discussion of screening - \"Screening\" means checking for diseases or other health problems before they cause symptoms. Your doctor can recommend screening based on your age, risk, and preferences. This might include tests to check for:   Cancer, such as breast, prostate, cervical, ovarian, colorectal, prostate, lung, or skin cancer   Sexually transmitted infections, such as chlamydia and gonorrhea   Mental health conditions like depression and anxiety  Your doctor will talk to you about the different types of screening tests. They can help you decide which screenings to have. They can also explain what the results might mean.   Answering questions - The physical is a good time to ask the doctor or nurse questions about your health. If needed, they can refer you to other doctors or specialists, too.  Adults older than 65 years often need other care, too. As you get older, your doctor will talk to you about:   How to prevent falling at " home   Hearing or vision tests   Memory testing   How to take your medicines safely   Making sure that you have the help and support you need at home  All topics are updated as new evidence becomes available and our peer review process is complete.  This topic retrieved from [employer] on: May 02, 2024.  Topic 533146 Version 1.0  Release: 32.4.3 - C32.122  © 2024 UpToDate, Inc. and/or its affiliates. All rights reserved.  Consumer Information Use and Disclaimer   Disclaimer: This generalized information is a limited summary of diagnosis, treatment, and/or medication information. It is not meant to be comprehensive and should be used as a tool to help the user understand and/or assess potential diagnostic and treatment options. It does NOT include all information about conditions, treatments, medications, side effects, or risks that may apply to a specific patient. It is not intended to be medical advice or a substitute for the medical advice, diagnosis, or treatment of a health care provider based on the health care provider's examination and assessment of a patient's specific and unique circumstances. Patients must speak with a health care provider for complete information about their health, medical questions, and treatment options, including any risks or benefits regarding use of medications. This information does not endorse any treatments or medications as safe, effective, or approved for treating a specific patient. UpToDate, Inc. and its affiliates disclaim any warranty or liability relating to this information or the use thereof.The use of this information is governed by the Terms of Use, available at https://www.woltersYouTernuwer.com/en/know/clinical-effectiveness-terms. 2024© UpToDate, Inc. and its affiliates and/or licensors. All rights reserved.  Copyright   © 2024 UpToDate, Inc. and/or its affiliates. All rights reserved.    Patient Education     Diet and health   The Basics   Written by the doctors and  "editors at [employer]   Why is it important to eat a healthy diet? -- It's important to eat a healthy diet because eating the right foods can keep you healthy now and later in life. It can lower the risk of problems like heart disease, diabetes, high blood pressure, and some types of cancer. It can also help you live longer and improve your quality of life.  What kind of diet is best? -- There is no 1 specific diet that experts recommend for everyone. People choose what foods to eat for many different reasons. These include personal preference, culture, Nondenominational, allergies or intolerances, and nutritional goals. People also need to consider the cost and availability of different foods.  In general, experts recommend a diet that:   Includes lots of vegetables, fruits, beans, nuts, and whole grains   Limits red and processed meats, unhealthy fats, sugar, salt, and alcohol  What are dietary patterns? -- A dietary \"pattern\" means generally eating certain types of foods while limiting others. Some people need to follow a specific dietary pattern because of their health needs. For example, if you have high blood pressure, your doctor might recommend a diet low in salt.  If you are trying to improve your health in general, choosing a healthy dietary pattern can help. This does not have to mean being very strict about what you eat or avoid. The goal is to think about getting plenty of healthy foods while limiting less healthy foods.  Examples of dietary patterns include:   Mediterranean diet - This involves eating a lot of fruits, vegetables, nuts, and whole grains, and uses olive oil instead of other fats. It also includes some fish, poultry, and dairy products, but not a lot of red meat. Following this diet can help your overall health, and might even lower your risk of having a stroke.   Plant-based diets - These patterns focus on vegetables, fruits, grains, beans, and nuts. They limit or avoid food that comes from " "animals, such as meat and dairy. There are different types of plant-based diets, including vegetarian and vegan.   Low-fat diet - A low-fat diet involves limiting calories from fat. This might help some people keep weight off if that is their goal, but it does not have many other health benefits. If you choose to follow a low-fat diet, it is also important to focus on getting lots of whole grains, legumes, fruits, and vegetables. Limit refined grains and sugar.   Low-cholesterol diet - Cholesterol is found in foods with a lot of saturated fat, like red meat, butter, and cheese. A low-cholesterol diet focuses on limiting the amount of cholesterol that you eat. Limiting the cholesterol in your diet can also help lower the amount of unhealthy fats that you eat.  Which foods are especially healthy? -- Foods that are especially healthy include:   Fruits and vegetables - Eating a diet with lots of fruits and vegetables can help prevent heart disease and stroke. It might also help prevent certain types of cancer. Try to eat fruits and vegetables at each meal and also for snacks. If you don't have fresh fruits and vegetables available, you can eat frozen or canned ones instead. Doctors recommend eating at least 5 servings of fruits or vegetables each day.   Whole grains - Whole-grain foods include 100 percent whole-wheat bread, steel cut oats, and whole-grain pasta. These are healthier than foods made with \"refined\" grains, like white bread and white rice. Eating lots of whole grains instead of refined grains has been shown to help with weight control. It can also lower the risk of several health problems, including colon cancer, heart disease, and diabetes. Doctors recommend that most people try to eat 5 to 8 servings of whole-grain, high-fiber foods each day.   Foods with fiber - Eating foods with a lot of fiber can help prevent heart disease and stroke. If you have type 2 diabetes, it can also help control your blood " "sugar. Foods that have a lot of fiber include vegetables, fruits, beans, nuts, oatmeal, and whole-grain breads and cereals. You can tell how much fiber is in a food by reading the nutrition label (figure 1). Doctors recommend that most people eat about 25 to 34 grams of fiber each day.   Foods with calcium and vitamin D - Babies, children, and adults need calcium and vitamin D to help keep their bones strong. Adults also need calcium and vitamin D to help prevent osteoporosis. Osteoporosis is a condition that causes bones to get \"thin\" and break more easily than usual. Different foods and drinks have calcium and vitamin D in them (figure 2). People who don't get enough calcium and vitamin D in their diet might need to take a supplement. Doctors recommend that most people have 2 to 3 servings of foods with calcium and vitamin D each day.   Foods with protein - Protein helps your muscles and bones stay strong. Healthy foods with a lot of protein include chicken, fish, eggs, beans, nuts, and soy products. Red meat also has a lot of protein, but it also contains fats, which can be unhealthy. Doctors recommend that most people try to eat about 5 servings of protein each day.   Healthy fats - There are different types of fats. Some types of fats are better for your body than others. Healthy fats are \"monounsaturated\" or \"polyunsaturated\" fats. These are found in fatty fish, nuts and nut butters, and avocados. Use plant-based oils when cooking. Examples of these oils include olive, canola, safflower, sunflower, and corn oil. Eating foods with healthy fats, while avoiding or limiting foods with unhealthy fats, might lower the risk of heart disease.   Foods with folate - Folate is a vitamin that is important for pregnant people, since it helps prevent certain birth defects. It is also called \"folic acid.\" Anyone who could get pregnant should get at least 400 micrograms of folic acid daily, whether or not they are actively " "trying to get pregnant. Folate is found in many breakfast cereals, oranges, orange juice, and green leafy vegetables.  What foods should I avoid or limit? -- To eat a healthy diet, there are some things that you should avoid or limit. They include:   Unhealthy fats - \"Trans\" fats are especially unhealthy. They are found in margarines, many fast foods, and some store-bought baked goods. \"Saturated\" fats are found in animal products like meats, egg yolks, butter, cheese, and full-fat milk products. Unhealthy fats can raise your cholesterol level and increase your chance of getting heart disease.   Sugar - To have a healthy diet, it's important to limit or avoid added sugar, sweets, and refined grains. Refined grains are found in white bread, white rice, most pastas, and most packaged \"snack\" foods.  Avoiding sugar-sweetened beverages, like soda and sports drinks, can also help improve your health.  Avoid canned fruits in \"heavy\" syrup.   Red and processed meats - Studies have shown that eating a lot of red meat can increase your risk of certain health problems, including heart disease and cancer. You should limit the amount of red meat that you eat. This is also true for processed meats like sausage, hot dogs, and sheehan.  Can I drink alcohol as part of a healthy diet? -- Not drinking alcohol at all is the healthiest choice. Regular drinking can raise a person's chances of getting liver disease and certain types of cancers. In females, even 1 drink a day can increase the risk of getting breast cancer.  If you do choose to drink, most doctors recommend limiting alcohol to no more than:   1 drink a day for females   2 drinks a day for males  The limits are different because, generally, the female body takes longer to break down alcohol.  How many calories do I need each day? -- Calories give your body energy. The number of calories that you need each day depends on your weight, height, age, sex, and how active you " "are.  Your doctor or nurse can tell you about how many calories you should eat each day. You can also work with a dietitian (nutrition expert) to learn more about your dietary needs and options.  What if I am having trouble improving my diet? -- It can be hard to change the way that you eat. Remember that even small changes can improve your health.  Here are some tips that might help:   Try to make fruits and vegetables part of every meal. If you don't have fresh fruits and vegetables, frozen or canned are good options. Look for products without added salt or sugar.   Keep a bowl of fruit out for snacking.   When you can, choose whole grains instead of refined grains. Choose chicken, fish, and beans instead of red meat and cheese.   Try to eat prepared and processed foods less often.   Try flavored seltzer or water instead of soda or juice.   When eating at fast food restaurants, look for healthier items, like broiled chicken or salad.  If you have questions about which foods you should or should not eat, ask your doctor, nurse, or dietitian. The right diet for you will depend, in part, on your health and any medical conditions you have.  All topics are updated as new evidence becomes available and our peer review process is complete.  This topic retrieved from [employer] on: Feb 28, 2024.  Topic 11948 Version 28.0  Release: 32.2.4 - C32.58  © 2024 UpToDate, Inc. and/or its affiliates. All rights reserved.  figure 1: Nutrition label - Fiber     This is an example of a nutrition label. To figure out how much fiber is in a food, look for the line that says \"Dietary Fiber.\" It's also important to look at the serving size. This food has 7 grams of fiber in each serving, and each serving is 1 cup.  Graphic 91190 Version 8.0  figure 2: Foods and drinks with calcium and vitamin D     Foods rich in calcium include ice cream, soy milk, breads, kale, broccoli, milk, cheese, cottage cheese, almonds, yogurt, ready-to-eat cereals, " "beans, and tofu. Foods rich in vitamin D include milk, fortified plant-based \"milks\" (soy, almond), canned tuna fish, cod liver oil, yogurt, ready-to-eat-cereals, cooked salmon, canned sardines, mackerel, and eggs. Some of these foods are rich in both.  Graphic 27714 Version 4.0  Consumer Information Use and Disclaimer   Disclaimer: This generalized information is a limited summary of diagnosis, treatment, and/or medication information. It is not meant to be comprehensive and should be used as a tool to help the user understand and/or assess potential diagnostic and treatment options. It does NOT include all information about conditions, treatments, medications, side effects, or risks that may apply to a specific patient. It is not intended to be medical advice or a substitute for the medical advice, diagnosis, or treatment of a health care provider based on the health care provider's examination and assessment of a patient's specific and unique circumstances. Patients must speak with a health care provider for complete information about their health, medical questions, and treatment options, including any risks or benefits regarding use of medications. This information does not endorse any treatments or medications as safe, effective, or approved for treating a specific patient. UpToDate, Inc. and its affiliates disclaim any warranty or liability relating to this information or the use thereof.The use of this information is governed by the Terms of Use, available at https://www.woltersKonotoruwer.com/en/know/clinical-effectiveness-terms. 2024© UpToDate, Inc. and its affiliates and/or licensors. All rights reserved.  Copyright   © 2024 UpToDate, Inc. and/or its affiliates. All rights reserved.    Patient Education     Exercise and movement   The Basics   Written by the doctors and editors at [employer]   What are the benefits of movement? -- Moving your body has many benefits. It can:   Burn calories, which helps people " manage their weight   Help control blood sugar levels in people with diabetes   Lower blood pressure, especially in people with high blood pressure   Lower stress, and help with depression and anxiety   Keep bones strong, so they don't get thin and break easily   Lower the chance of dying from heart disease  Adding even small amounts of physical activity to your daily routine can improve your health.  What are the main types of exercise? -- There are 3 main types of exercise:   Aerobic exercise - This raises your heart rate. Examples include walking, running, dancing, riding a bike, and swimming.   Muscle strengthening - This helps make your muscles stronger. You can do it using weights, exercise bands, or weight machines. You can also use your own body weight, as with push-ups, or by lifting items in your home, like jugs of water.   Stretching - These help your muscles and joints move more easily.  It's important to have all 3 types in your exercise program. That way, your body, muscles, and joints can be as healthy as possible.  Should I talk to my doctor or nurse before I start exercising? -- If you have not exercised before or have not exercised in a long time, talk with your doctor or nurse before you start a very active exercise program.  If you have heart disease or risk factors for heart disease (like high blood pressure or diabetes), your doctor or nurse might recommend that you have an exercise test before starting an exercise program.  When you begin an exercise program, start slowly. For example, do the exercise at a slow pace or for a few minutes only. Over time, you can exercise faster and for longer periods of time.  What should I do when I exercise? -- Each time you exercise, you should:   Warm up - This can help keep you from hurting your muscles when you exercise. To warm up, do a light aerobic exercise (such as walking slowly) or stretch for 5 to 10 minutes.   Work out - Try to get a mix of  aerobic exercise, muscle strengthening, and stretching. During an aerobic workout, you can walk fast, swim, run, or use an exercise machine, for example. Other activities, like dancing or playing tennis, are also forms of aerobic exercise. You should also take time to stretch all of your joints, including your neck, shoulders, back, hips, and knees. At least 2 times a week, you can do muscle strengthening exercises as part of your workout.   Cool down - This helps keep you from feeling dizzy after you exercise and helps prevent muscle cramps. To cool down, you can stretch or do a light aerobic exercise for 5 minutes.  Some people go to a gym or do group exercise classes. But you can exercise even without these things. Some exercises can be done even in a small space. You can also try online videos or smartphone apps to get ideas for different types of exercise.  How often should I exercise? -- Doctors recommend that people exercise at least 30 minutes a day, on 5 or more days of the week.  If you can't exercise for 30 minutes straight, try to exercise for 10 minutes at a time, 3 or 4 times a day. Even exercising for shorter amounts of time is good for you, especially if it means spending less time sitting.  When should I call my doctor or nurse? -- If you have any of the following symptoms when you exercise, stop exercising and call your doctor or nurse right away:   Pain or pressure in your chest, arms, throat, jaw, or back   Nausea or vomiting   Feeling like your heart is fluttering or racing very fast   Feeling dizzy or faint  What if I don't have time to exercise? -- Many people have very busy lives and might not think that they have time to exercise. But it's important to try to find time, even if you are tired or work a lot. Exercise can increase your energy level, which can make you feel better and might even help you get more work done.  Even if it's hard to set aside a lot of time to exercise, you can still  improve your health by moving your body more. There are many ways that you can be more active. For example, you can:   Take the stairs instead of the elevator.   Park in a parking space that is farther away from the door.   Take a longer route when you walk from one place to another.  Spending a lot of time sitting still (for example, watching TV or working on the computer) is bad for your health. Try to get up and move around whenever you can. Even small amounts of movement, like taking short walks, doing household chores, or gardening, can improve your health. Finding activities that you enjoy, or doing them with other people, can help you add more movement into your daily life.  What else should I do when I exercise? -- To exercise safely and avoid problems, it's important to:   Drink fluids during and after exercising (but avoid drinks with a lot of caffeine or sugar).   Avoid exercising outside if it is too hot or cold.   Wear layers of clothes, so that you can take them off if you get too hot.   Wear shoes that fit well and support your feet.   Be aware of your surroundings if you exercise outside.  All topics are updated as new evidence becomes available and our peer review process is complete.  This topic retrieved from [employer] on: May 18, 2024.  Topic 16750 Version 31.0  Release: 32.4.3 - C32.137  © 2024 UpToDate, Inc. and/or its affiliates. All rights reserved.  Consumer Information Use and Disclaimer   Disclaimer: This generalized information is a limited summary of diagnosis, treatment, and/or medication information. It is not meant to be comprehensive and should be used as a tool to help the user understand and/or assess potential diagnostic and treatment options. It does NOT include all information about conditions, treatments, medications, side effects, or risks that may apply to a specific patient. It is not intended to be medical advice or a substitute for the medical advice, diagnosis, or  treatment of a health care provider based on the health care provider's examination and assessment of a patient's specific and unique circumstances. Patients must speak with a health care provider for complete information about their health, medical questions, and treatment options, including any risks or benefits regarding use of medications. This information does not endorse any treatments or medications as safe, effective, or approved for treating a specific patient. [employer] and its affiliates disclaim any warranty or liability relating to this information or the use thereof.The use of this information is governed by the Terms of Use, available at https://www.[employer].com/en/know/clinical-effectiveness-terms. 2024© UpToDate, Inc. and its affiliates and/or licensors. All rights reserved.  Copyright   © 2024 UpToDate, Inc. and/or its affiliates. All rights reserved.

## 2024-10-22 NOTE — PROGRESS NOTES
Adult Annual Physical  Name: Kandy Stone      : 1975      MRN: [id number]  Encounter Provider: Burton Grewal MD  Encounter Date: 10/22/2024   Encounter department: [facility]    Assessment & Plan  Encounter for screening mammogram for malignant neoplasm of breast    Orders:    Mammo screening bilateral w 3d and cad; Future    Annual physical exam    Orders:    UA w Reflex to Microscopic w Reflex to Culture; Future    Lipid Panel with Direct LDL reflex; Future    Hemoglobin A1C; Future    CBC and Platelet; Future    Comprehensive metabolic panel; Future    TSH, 3rd generation with Free T4 reflex; Future    Vitamin D 25 hydroxy; Future    Immunizations and preventive care screenings were discussed with patient today. Appropriate education was printed on patient's after visit summary.    Counseling:  Alcohol/drug use: discussed moderation in alcohol intake, the recommendations for healthy alcohol use, and avoidance of illicit drug use.  Dental Health: discussed importance of regular tooth brushing, flossing, and dental visits.  Injury prevention: discussed safety/seat belts, safety helmets, smoke detectors, carbon monoxide detectors, and smoking near bedding or upholstery.  Sexual health: discussed sexually transmitted diseases, partner selection, use of condoms, avoidance of unintended pregnancy, and contraceptive alternatives.  Exercise: the importance of regular exercise/physical activity was discussed. Recommend exercise 3-5 times per week for at least 30 minutes.          History of Present Illness     Adult Annual Physical:  Patient presents for annual physical.     Diet and Physical Activity:  - Diet/Nutrition: well balanced diet, limited junk food and low calorie diet.  - Exercise: walking, moderate cardiovascular exercise, 1-2 times a week on average and strength training exercises.    Depression Screening:  - PHQ-2 Score: 0    General Health:  - Sleep:  sleeps well and > 8 hours of sleep on average.  - Hearing: normal hearing left ear.  - Vision: wears glasses and goes for regular eye exams.  - Dental: regular dental visits.    /GYN Health:  - Follows with GYN: yes.   - Menopause: premenopausal.   - Last menstrual cycle: 10/22/2024.   - History of STDs: no  - Contraception:. None      Advanced Care Planning:  - Has an advanced directive?: no    - Has a durable medical POA?: no    - ACP document given to patient?: no      Review of Systems   Constitutional:  Positive for fatigue and unexpected weight change (Weight gain). Negative for chills.   HENT:  Negative for congestion, ear pain, hearing loss, postnasal drip, sinus pressure, sore throat and voice change.    Eyes:  Negative for pain, discharge and visual disturbance.   Respiratory:  Negative for cough, chest tightness and shortness of breath.    Cardiovascular:  Negative for chest pain, palpitations and leg swelling.   Gastrointestinal:  Positive for abdominal pain (Pain in the epigastric). Negative for blood in stool, diarrhea, nausea and rectal pain.   Genitourinary:  Negative for difficulty urinating, dysuria and urgency.   Musculoskeletal:  Positive for myalgias. Negative for arthralgias and joint swelling.   Skin:  Negative for rash.   Allergic/Immunologic: Negative for environmental allergies and food allergies.   Neurological:  Negative for dizziness, tremors, weakness, numbness and headaches.   Hematological:  Negative for adenopathy.   Psychiatric/Behavioral:  Negative for behavioral problems and hallucinations.      Medical History Reviewed by provider this encounter:       Current Outpatient Medications on File Prior to Visit   Medication Sig Dispense Refill    MAGNESIUM SULFATE PO Take 1 tablet by mouth in the morning      multivitamin (THERAGRAN) TABS Take 1 tablet by mouth daily      TURMERIC PO Take 1 tablet by mouth 2 (two) times a day      [DISCONTINUED] ferrous sulfate 325 (65 Fe) mg tablet  "Take 325 mg by mouth daily with breakfast      [DISCONTINUED] naproxen sodium (ANAPROX) 550 mg tablet Take 1 tablet (550 mg total) by mouth 2 (two) times a day as needed for mild pain 30 tablet 1    [DISCONTINUED] Nutritional Supplements (BALANCED NUTRITIONAL SHAKE PLS PO) Take by mouth in the morning       No current facility-administered medications on file prior to visit.        Objective     /78 (BP Location: Left arm, Patient Position: Sitting, Cuff Size: Large)   Pulse 72   Temp 97.5 °F (36.4 °C) (Temporal)   Ht 5' 4\" (1.626 m)   Wt 80.3 kg (177 lb)   SpO2 96%   BMI 30.38 kg/m²     Physical Exam  HENT:      Head: Normocephalic.   Eyes:      Pupils: Pupils are equal, round, and reactive to light.   Cardiovascular:      Rate and Rhythm: Normal rate and regular rhythm.      Heart sounds: No murmur heard.  Pulmonary:      Effort: Pulmonary effort is normal.      Breath sounds: Normal breath sounds.   Abdominal:      General: Bowel sounds are normal.      Palpations: Abdomen is soft.      Tenderness: There is abdominal tenderness in the epigastric area.      Hernia: No hernia is present.   Musculoskeletal:      Cervical back: Normal range of motion.   Skin:     General: Skin is warm.   Neurological:      General: No focal deficit present.      Mental Status: She is alert. She is disoriented.   Psychiatric:         Behavior: Behavior normal.         Thought Content: Thought content normal.         "

## 2024-10-23 ENCOUNTER — TELEPHONE (OUTPATIENT)
Dept: INTERNAL MEDICINE CLINIC | Age: 49
End: 2024-10-23

## 2024-10-23 ENCOUNTER — APPOINTMENT (OUTPATIENT)
Dept: LAB | Facility: CLINIC | Age: 49
End: 2024-10-23
Payer: COMMERCIAL

## 2024-10-23 DIAGNOSIS — Z01.818 PRE-OP TESTING: ICD-10-CM

## 2024-10-23 DIAGNOSIS — Z00.00 ANNUAL PHYSICAL EXAM: ICD-10-CM

## 2024-10-23 LAB
25(OH)D3 SERPL-MCNC: 33.2 NG/ML (ref 30–100)
ALBUMIN SERPL BCG-MCNC: 4.1 G/DL (ref 3.5–5)
ALP SERPL-CCNC: 57 U/L (ref 34–104)
ALT SERPL W P-5'-P-CCNC: 17 U/L (ref 7–52)
ANION GAP SERPL CALCULATED.3IONS-SCNC: 7 MMOL/L (ref 4–13)
AST SERPL W P-5'-P-CCNC: 19 U/L (ref 13–39)
BACTERIA UR QL AUTO: ABNORMAL /HPF
BILIRUB SERPL-MCNC: 0.53 MG/DL (ref 0.2–1)
BILIRUB UR QL STRIP: NEGATIVE
BUN SERPL-MCNC: 18 MG/DL (ref 5–25)
CALCIUM SERPL-MCNC: 9.1 MG/DL (ref 8.4–10.2)
CHLORIDE SERPL-SCNC: 106 MMOL/L (ref 96–108)
CHOLEST SERPL-MCNC: 263 MG/DL
CLARITY UR: CLEAR
CO2 SERPL-SCNC: 26 MMOL/L (ref 21–32)
COLOR UR: ABNORMAL
CREAT SERPL-MCNC: 0.77 MG/DL (ref 0.6–1.3)
ERYTHROCYTE [DISTWIDTH] IN BLOOD BY AUTOMATED COUNT: 13.7 % (ref 11.6–15.1)
EST. AVERAGE GLUCOSE BLD GHB EST-MCNC: 108 MG/DL
GFR SERPL CREATININE-BSD FRML MDRD: 90 ML/MIN/1.73SQ M
GLUCOSE P FAST SERPL-MCNC: 91 MG/DL (ref 65–99)
GLUCOSE UR STRIP-MCNC: NEGATIVE MG/DL
HBA1C MFR BLD: 5.4 %
HCT VFR BLD AUTO: 41.4 % (ref 34.8–46.1)
HDLC SERPL-MCNC: 57 MG/DL
HGB BLD-MCNC: 13.4 G/DL (ref 11.5–15.4)
HGB UR QL STRIP.AUTO: ABNORMAL
HYALINE CASTS #/AREA URNS LPF: ABNORMAL /LPF
KETONES UR STRIP-MCNC: ABNORMAL MG/DL
LDLC SERPL CALC-MCNC: 195 MG/DL (ref 0–100)
LEUKOCYTE ESTERASE UR QL STRIP: NEGATIVE
MCH RBC QN AUTO: 28.3 PG (ref 26.8–34.3)
MCHC RBC AUTO-ENTMCNC: 32.4 G/DL (ref 31.4–37.4)
MCV RBC AUTO: 87 FL (ref 82–98)
NITRITE UR QL STRIP: NEGATIVE
NON-SQ EPI CELLS URNS QL MICRO: ABNORMAL /HPF
PH UR STRIP.AUTO: 5.5 [PH]
PLATELET # BLD AUTO: 292 THOUSANDS/UL (ref 149–390)
PMV BLD AUTO: 10.7 FL (ref 8.9–12.7)
POTASSIUM SERPL-SCNC: 4.5 MMOL/L (ref 3.5–5.3)
PROT SERPL-MCNC: 7.3 G/DL (ref 6.4–8.4)
PROT UR STRIP-MCNC: NEGATIVE MG/DL
RBC # BLD AUTO: 4.74 MILLION/UL (ref 3.81–5.12)
RBC #/AREA URNS AUTO: ABNORMAL /HPF
SODIUM SERPL-SCNC: 139 MMOL/L (ref 135–147)
SP GR UR STRIP.AUTO: 1.02 (ref 1–1.03)
TRIGL SERPL-MCNC: 54 MG/DL
TSH SERPL DL<=0.05 MIU/L-ACNC: 1.01 UIU/ML (ref 0.45–4.5)
UROBILINOGEN UR STRIP-ACNC: <2 MG/DL
WBC # BLD AUTO: 2.84 THOUSAND/UL (ref 4.31–10.16)
WBC #/AREA URNS AUTO: ABNORMAL /HPF

## 2024-10-23 PROCEDURE — 83036 HEMOGLOBIN GLYCOSYLATED A1C: CPT

## 2024-10-23 PROCEDURE — 80061 LIPID PANEL: CPT

## 2024-10-23 PROCEDURE — 85027 COMPLETE CBC AUTOMATED: CPT

## 2024-10-23 PROCEDURE — 80053 COMPREHEN METABOLIC PANEL: CPT

## 2024-10-23 PROCEDURE — 82306 VITAMIN D 25 HYDROXY: CPT

## 2024-10-23 PROCEDURE — 36415 COLL VENOUS BLD VENIPUNCTURE: CPT

## 2024-10-23 PROCEDURE — 84443 ASSAY THYROID STIM HORMONE: CPT

## 2024-10-23 PROCEDURE — 81001 URINALYSIS AUTO W/SCOPE: CPT

## 2024-10-23 NOTE — TELEPHONE ENCOUNTER
Left message on machine for patient to call the office back to discuss results      ----- Message from Burton Grewal MD sent at 10/23/2024  2:57 PM EDT -----  Just let her know that her cholesterol was very high and give her the instruction for low-cholesterol diet also there was a blood in the urine most likely secondary to her.  It is I think her period and head

## 2024-12-23 ENCOUNTER — TELEPHONE (OUTPATIENT)
Dept: INTERNAL MEDICINE CLINIC | Facility: OTHER | Age: 49
End: 2024-12-23

## 2024-12-23 NOTE — TELEPHONE ENCOUNTER
Received lab coding error from [facility].  Form has been scanned into patients chart and given to Misael to address.

## 2025-04-28 ENCOUNTER — TELEPHONE (OUTPATIENT)
Age: 50
End: 2025-04-28

## 2025-04-28 ENCOUNTER — OFFICE VISIT (OUTPATIENT)
Dept: INTERNAL MEDICINE CLINIC | Age: 50
End: 2025-04-28
Payer: COMMERCIAL

## 2025-04-28 ENCOUNTER — APPOINTMENT (OUTPATIENT)
Dept: LAB | Age: 50
End: 2025-04-28
Payer: COMMERCIAL

## 2025-04-28 VITALS
TEMPERATURE: 98.1 F | OXYGEN SATURATION: 96 % | HEIGHT: 64 IN | BODY MASS INDEX: 30.73 KG/M2 | HEART RATE: 73 BPM | WEIGHT: 180 LBS | SYSTOLIC BLOOD PRESSURE: 110 MMHG | DIASTOLIC BLOOD PRESSURE: 78 MMHG

## 2025-04-28 DIAGNOSIS — E78.2 MIXED HYPERLIPIDEMIA: ICD-10-CM

## 2025-04-28 DIAGNOSIS — K64.8 OTHER HEMORRHOIDS: ICD-10-CM

## 2025-04-28 DIAGNOSIS — K62.5 RECTAL BLEEDING: ICD-10-CM

## 2025-04-28 DIAGNOSIS — Z12.31 ENCOUNTER FOR SCREENING MAMMOGRAM FOR MALIGNANT NEOPLASM OF BREAST: ICD-10-CM

## 2025-04-28 DIAGNOSIS — K62.9 ANAL LESION: Primary | ICD-10-CM

## 2025-04-28 DIAGNOSIS — Z12.4 CERVICAL CANCER SCREENING: ICD-10-CM

## 2025-04-28 LAB
BASOPHILS # BLD AUTO: 0.05 THOUSANDS/ÂΜL (ref 0–0.1)
BASOPHILS NFR BLD AUTO: 1 % (ref 0–1)
CHOLEST SERPL-MCNC: 222 MG/DL (ref ?–200)
EOSINOPHIL # BLD AUTO: 0.26 THOUSAND/ÂΜL (ref 0–0.61)
EOSINOPHIL NFR BLD AUTO: 7 % (ref 0–6)
ERYTHROCYTE [DISTWIDTH] IN BLOOD BY AUTOMATED COUNT: 13.8 % (ref 11.6–15.1)
EST. AVERAGE GLUCOSE BLD GHB EST-MCNC: 108 MG/DL
HBA1C MFR BLD: 5.4 %
HCT VFR BLD AUTO: 43.1 % (ref 34.8–46.1)
HDLC SERPL-MCNC: 62 MG/DL
HGB BLD-MCNC: 14.9 G/DL (ref 11.5–15.4)
IMM GRANULOCYTES # BLD AUTO: 0.01 THOUSAND/UL (ref 0–0.2)
IMM GRANULOCYTES NFR BLD AUTO: 0 % (ref 0–2)
LDLC SERPL CALC-MCNC: 148 MG/DL (ref 0–100)
LYMPHOCYTES # BLD AUTO: 1.57 THOUSANDS/ÂΜL (ref 0.6–4.47)
LYMPHOCYTES NFR BLD AUTO: 40 % (ref 14–44)
MCH RBC QN AUTO: 29.8 PG (ref 26.8–34.3)
MCHC RBC AUTO-ENTMCNC: 34.6 G/DL (ref 31.4–37.4)
MCV RBC AUTO: 86 FL (ref 82–98)
MONOCYTES # BLD AUTO: 0.24 THOUSAND/ÂΜL (ref 0.17–1.22)
MONOCYTES NFR BLD AUTO: 6 % (ref 4–12)
NEUTROPHILS # BLD AUTO: 1.8 THOUSANDS/ÂΜL (ref 1.85–7.62)
NEUTS SEG NFR BLD AUTO: 46 % (ref 43–75)
NONHDLC SERPL-MCNC: 160 MG/DL
NRBC BLD AUTO-RTO: 0 /100 WBCS
PLATELET # BLD AUTO: 312 THOUSANDS/UL (ref 149–390)
PMV BLD AUTO: 10 FL (ref 8.9–12.7)
RBC # BLD AUTO: 5 MILLION/UL (ref 3.81–5.12)
TRIGL SERPL-MCNC: 60 MG/DL (ref ?–150)
WBC # BLD AUTO: 3.93 THOUSAND/UL (ref 4.31–10.16)

## 2025-04-28 PROCEDURE — 80061 LIPID PANEL: CPT

## 2025-04-28 PROCEDURE — 99214 OFFICE O/P EST MOD 30 MIN: CPT | Performed by: INTERNAL MEDICINE

## 2025-04-28 PROCEDURE — 36415 COLL VENOUS BLD VENIPUNCTURE: CPT

## 2025-04-28 NOTE — PATIENT INSTRUCTIONS
Patient Education     How to take a sitz bath   The Basics   Written by the doctors and editors at [employer]   What is a sitz bath? -- A sitz bath is a shallow, warm bath that you sit in. It can help heal injuries in your genital area, perineum, or anal area. (The perineum is the area between the genitals and the anus.) A sitz bath can also help with symptoms like pain, swelling, itching, or burning.  Your doctor or nurse might suggest a sitz bath because you:   Had a vaginal childbirth - This is when a baby is born through the vagina. Sometimes, vaginal birth can cause the perineum to tear.   Have an anal fissure - This is a small tear in lining of the anus.   Have hemorrhoids - These are swollen veins in the lower rectum that can cause pain or itching.   Have another condition that is causing symptoms in the genital or anal area  How do I take a sitz bath?    Fill a clean bathtub with 2 to 3 inches (5 to 8 cm) of warm water - Make sure that the water is not too hot. If you do not have a bathtub, you can buy a special basin that fits over your toilet seat.   Use plain water - Do not add soap, bubble bath, or anything else to the water. If you want, you can add 1 cup of Epsom salt.   Sit in the water for 5 to 10 minutes at least 2 to 3 times a day. You can take sitz baths more often if needed.   When you are done, drain the water and rinse the tub (or basin).   Gently pat the area dry with a clean towel - Do not rub the area too hard. You can also use a hair dryer on a cool, low setting to dry the area.  When should I call the doctor? -- Call your doctor for advice if:   Your pain is getting worse.   You have a fever of 100.4°F (38°C) or higher.   You have heavy bleeding.  All topics are updated as new evidence becomes available and our peer review process is complete.  This topic retrieved from [employer] on: Apr 11, 2024.  Topic 046774 Version 1.0  Release: 32.3.2 - C32.100  © 2024 UpToDate, Inc. and/or its  affiliates. All rights reserved.  Consumer Information Use and Disclaimer   Disclaimer: This generalized information is a limited summary of diagnosis, treatment, and/or medication information. It is not meant to be comprehensive and should be used as a tool to help the user understand and/or assess potential diagnostic and treatment options. It does NOT include all information about conditions, treatments, medications, side effects, or risks that may apply to a specific patient. It is not intended to be medical advice or a substitute for the medical advice, diagnosis, or treatment of a health care provider based on the health care provider's examination and assessment of a patient's specific and unique circumstances. Patients must speak with a health care provider for complete information about their health, medical questions, and treatment options, including any risks or benefits regarding use of medications. This information does not endorse any treatments or medications as safe, effective, or approved for treating a specific patient. UpToDate, Inc. and its affiliates disclaim any warranty or liability relating to this information or the use thereof.The use of this information is governed by the Terms of Use, available at https://www.[employer].com/en/know/clinical-effectiveness-terms. 2024© UpToDate, Inc. and its affiliates and/or licensors. All rights reserved.  Copyright   © 2024 UpToDate, Inc. and/or its affiliates. All rights reserved.

## 2025-04-28 NOTE — TELEPHONE ENCOUNTER
Radha from [facility] called in to get pt scheduled in an urgent slot for an anal lesion and rectal bleeding. Transferred her over to triage and Ute took the call for further assistance.

## 2025-04-28 NOTE — PROGRESS NOTES
Name: Kandy Stone      : 1975      MRN: [id number]  Encounter Provider: Jaci Aguirre DO  Encounter Date: 2025   Encounter department: [facility]  :  Assessment & Plan  Anal lesion  -Anal lesion appears to be a hemorrhoid versus prolapse  - Will refer patient to colorectal surgery ASAP especially because it is bleeding on and off  Orders:    Ambulatory Referral to Colorectal Surgery; Future    Encounter for screening mammogram for malignant neoplasm of breast  -Patient declined mammogram which was already ordered by her PCP on 10/22/2024  - Continue to follow with PCP       Other hemorrhoids  -Patient was counseled to avoid constipation at all cost  - Will prescribe Preparation H  - Will refer her to colorectal surgery ASAP because of the bleeding and pain  -Patient was counseled to take a sitz bath  - She may use over-the-counter acetaminophen as needed  Orders:    phenylephrine-shark liver oil-mineral oil-petrolatum (PREPARATION H) 0.25-3-14-71.9 % rectal ointment; Insert into the rectum 2 (two) times a day as needed for hemorrhoids    Ambulatory Referral to Colorectal Surgery; Future    Rectal bleeding  -Likely secondary to hemorrhoids or other anal lesion  - Will refer patient to colorectal surgery ASAP  - Go to the emergency department if symptoms worsen  Orders:    phenylephrine-shark liver oil-mineral oil-petrolatum (PREPARATION H) 0.25-3-14-71.9 % rectal ointment; Insert into the rectum 2 (two) times a day as needed for hemorrhoids    Ambulatory Referral to Colorectal Surgery; Future    Mixed hyperlipidemia  Last LDL was elevated at 195  - Patient states that she has stopped the keto diet  - Will order repeat lipid panel and follow-up with the results  - Follow-up with PCP  Orders:    Lipid panel; Future    Cervical cancer screening  -Patient has possible GYN issues and needs a female GYN that she will be comfortable with  - Will refer her to OB  GYN  Orders:    Ambulatory Referral to Obstetrics / Gynecology; Future          Depression Screening and Follow-up Plan: Patient was screened for depression during today's encounter. They screened negative with a PHQ-2 score of 0.        History of Present Illness   HPI    Patient presents with complaints of some bumps in her anus that she noticed 6 days ago   Started with pain in the anal region and then she noticed the bumps.  She states that she has never had this problem in the past.  Travelled by air the day before for five hours one way prior to onset of symptoms.  She states that she had traveled to her country and stayed there for 5 days before returning.  Used to have constipation and now  takes magnesium and it helps.  She has bowel movements every day without constipation or diarrhea.  Has three babies all by  and the first baby was big and labour was long   She wants a referral to endo cos she gained a lot of wt and believes that it her menopause is causing it   Her lmp was at the beginning of April and she sees her periods every month but has not seen her OB/GYN.  She states that she also has stress incontinence   She has also gained wt and that bothers her a lot   No history of anal intercouse  Was doing a keto diet and her cholesterol hussain and she stopped it and wants to repeat the lipid panel  She denies  any fever, chills, night sweats, headache, dizziness, cough, chest pain, sob, palpitations, nausea, vomiting, diarrhea, constipation, hematochezia, hematuria, melena stools, arthralgias, myalgias, feelings of anxiety, depression or insomnia.    Review of Systems   Constitutional:  Positive for unexpected weight change (gained 43 lbs in less than 3 years). Negative for activity change, chills, fatigue and fever.   HENT:  Negative for ear pain, postnasal drip, rhinorrhea, sinus pressure and sore throat.    Eyes:  Negative for pain.   Respiratory:  Negative for cough, choking, chest tightness,  "shortness of breath and wheezing.    Cardiovascular:  Negative for chest pain, palpitations and leg swelling.   Gastrointestinal:  Positive for blood in stool (blood on the tissues when she wipes) and rectal pain. Negative for abdominal pain, constipation, diarrhea, nausea and vomiting.   Genitourinary:  Negative for dysuria and hematuria.   Musculoskeletal:  Positive for back pain. Negative for arthralgias, gait problem, joint swelling, myalgias and neck stiffness.   Skin:  Negative for pallor and rash.   Neurological:  Negative for dizziness, tremors, seizures, syncope, light-headedness and headaches.   Hematological:  Negative for adenopathy.   Psychiatric/Behavioral:  Negative for behavioral problems.        Objective   /78 (BP Location: Left arm, Patient Position: Sitting, Cuff Size: Large)   Pulse 73   Temp 98.1 °F (36.7 °C) (Temporal)   Ht 5' 4\" (1.626 m)   Wt 81.6 kg (180 lb)   SpO2 96%   BMI 30.90 kg/m²      Physical Exam  Exam conducted with a chaperone present (Deirdre Gardner MA).   Constitutional:       General: She is not in acute distress.     Appearance: She is well-developed. She is not diaphoretic.   HENT:      Head: Normocephalic and atraumatic.      Right Ear: External ear normal.      Left Ear: External ear normal.      Nose: Nose normal.      Mouth/Throat:      Mouth: Mucous membranes are dry.      Pharynx: Posterior oropharyngeal erythema present. No oropharyngeal exudate.   Eyes:      General: No scleral icterus.        Right eye: No discharge.         Left eye: No discharge.      Conjunctiva/sclera: Conjunctivae normal.      Pupils: Pupils are equal, round, and reactive to light.   Neck:      Thyroid: No thyromegaly.      Vascular: No JVD.      Trachea: No tracheal deviation.   Cardiovascular:      Rate and Rhythm: Normal rate and regular rhythm.      Heart sounds: Normal heart sounds. No murmur heard.     No friction rub. No gallop.   Pulmonary:      Effort: Pulmonary effort is " normal. No respiratory distress.      Breath sounds: Normal breath sounds. No wheezing or rales.   Chest:      Chest wall: No tenderness.   Abdominal:      General: Bowel sounds are normal. There is no distension.      Palpations: Abdomen is soft. There is no mass.      Tenderness: There is abdominal tenderness (mild tenderness in the lower abd) in the right lower quadrant, suprapubic area and left lower quadrant. There is no guarding or rebound.   Genitourinary:         Comments: Left lateral decubitus position  Musculoskeletal:         General: No tenderness or deformity. Normal range of motion.      Cervical back: Normal range of motion and neck supple.   Lymphadenopathy:      Cervical: No cervical adenopathy.   Skin:     General: Skin is warm and dry.      Coloration: Skin is not pale.      Findings: No erythema or rash.   Neurological:      Mental Status: She is alert and oriented to person, place, and time.      Cranial Nerves: No cranial nerve deficit.      Motor: No abnormal muscle tone.      Coordination: Coordination normal.      Deep Tendon Reflexes: Reflexes are normal and symmetric.   Psychiatric:         Behavior: Behavior normal.

## 2025-04-28 NOTE — TELEPHONE ENCOUNTER
Radha from pt's PCP calling to schedule Urgent appointment with Colon & Rectal for anal lesion with rectal bleeding. Referral in chart. Soonest appointment available is for 5/6/25 with Dr. Kelley. Booked this slot.  Please call pt back if able to offer sooner appointment. Pt is willing to go to any office but [place].

## 2025-04-28 NOTE — TELEPHONE ENCOUNTER
VM to see if patient could come in 4/30/25 at 1:20 p.m. with Dr Lynch at [facility].  Told patient to call to confirm this appointment.

## 2025-04-30 ENCOUNTER — OFFICE VISIT (OUTPATIENT)
Age: 50
End: 2025-04-30
Payer: COMMERCIAL

## 2025-04-30 VITALS
HEIGHT: 64 IN | DIASTOLIC BLOOD PRESSURE: 66 MMHG | WEIGHT: 181 LBS | SYSTOLIC BLOOD PRESSURE: 112 MMHG | BODY MASS INDEX: 30.9 KG/M2

## 2025-04-30 DIAGNOSIS — K62.5 RECTAL BLEEDING: ICD-10-CM

## 2025-04-30 DIAGNOSIS — K64.8 OTHER HEMORRHOIDS: ICD-10-CM

## 2025-04-30 DIAGNOSIS — K64.5 THROMBOSED EXTERNAL HEMORRHOID: Primary | ICD-10-CM

## 2025-04-30 DIAGNOSIS — K62.9 ANAL LESION: ICD-10-CM

## 2025-04-30 PROCEDURE — 99243 OFF/OP CNSLTJ NEW/EST LOW 30: CPT | Performed by: COLON & RECTAL SURGERY

## 2025-04-30 NOTE — PROGRESS NOTES
Colon and Rectal Surgery   Kandy Stone 49 y.o. female MRN [id number]  Encounter: [id number]  04/30/25 1:29 PM            Assessment: Kandy Stone is a 49 y.o. female who has an anal mass that is improving.       Plan:   Thrombosed external hemorrhoid  The patient has a resolving thrombosed external hemorrhoid.  She was concerned that it could be cancer.  My findings are those of a classic thrombosed external hemorrhoid.  It seems to be resolving with time.    I recommend that she return if the symptoms fail to resolve in the next 2 to 3 months.  My evaluation does not suggest that there is any neoplastic risk to the area.  I explained this to her to reassure her.      Subjective     HPI    Kandy Stone is a 49 y.o. female who is referred today by Dr. Jaci Aguirre for anal lesion; hemorrhoids; rectal bleeding.     The patient notes a week ago she first noticed an anal lump which was initially painful and bleeds on a daily basis throughout the whole day. Notes the pain has improved over time and the lump seems to be shrinking.  Using Preparation H as needed.      Last colonoscopy performed on 4/24/2023 by Dr. Mullins, with a 10 year recall.     Historical Information   Past Medical History:   Diagnosis Date    Anemia     COVID 04/2021     Past Surgical History:   Procedure Laterality Date    AUGMENTATION MAMMAPLASTY Bilateral     2011, saline    AUGMENTATION MAMMAPLASTY Bilateral     implants redone 6/30/21    COSMETIC SURGERY Bilateral     Breast implants    AL HYSTEROSCOPY BX ENDOMETRIUM&/POLYPC W/WO D&C N/A 12/13/2022    Procedure: D&C W/ HYSTEROSCOPY;  Surgeon: Collin Jarquin MD;  Location: [facility];  Service: Gynecology    AL HYSTEROSCOPY BX ENDOMETRIUM&/POLYPC W/WO D&C N/A 12/13/2022    Procedure: Endometrial Polypectomy;  Surgeon: Collin Jarquin MD;  Location: [facility];  Service: Gynecology       Meds/Allergies       Current Outpatient Medications:     phenylephrine-shark liver oil-mineral  "oil-petrolatum (PREPARATION H) 0.25-3-14-71.9 % rectal ointment, Insert into the rectum 2 (two) times a day as needed for hemorrhoids, Disp: 57 g, Rfl: 1  Allergies   Allergen Reactions    Pollen Extract Sneezing     Seasonal        Social History   Social History     Substance and Sexual Activity   Drug Use Never     Social History     Tobacco Use   Smoking Status Never   Smokeless Tobacco Never         Family History   Problem Relation Age of Onset    No Known Problems Mother     Hypertension Father     Breast cancer Sister         47    No Known Problems Daughter     No Known Problems Maternal Grandmother     No Known Problems Maternal Grandfather     No Known Problems Paternal Grandmother     No Known Problems Paternal Grandfather     No Known Problems Daughter     Breast cancer Maternal Aunt     Stomach cancer Maternal Aunt          Review of Systems   Constitutional: Negative.    Gastrointestinal:  Positive for rectal pain.       Objective   Current Vitals:  Vitals:    04/30/25 1306   BP: 112/66   Weight: 82.1 kg (181 lb)   Height: 5' 4\" (1.626 m)         Physical Exam  Constitutional:       Appearance: Normal appearance.   Genitourinary:     Rectum: Normal.      Comments: Resolving right posterior thrombosed external hemorrhoid  Neurological:      General: No focal deficit present.      Mental Status: She is alert and oriented to person, place, and time.               "

## 2025-04-30 NOTE — ASSESSMENT & PLAN NOTE
The patient has a resolving thrombosed external hemorrhoid.  She was concerned that it could be cancer.  My findings are those of a classic thrombosed external hemorrhoid.  It seems to be resolving with time.    I recommend that she return if the symptoms fail to resolve in the next 2 to 3 months.  My evaluation does not suggest that there is any neoplastic risk to the area.  I explained this to her to reassure her.

## 2025-05-11 ENCOUNTER — HOSPITAL ENCOUNTER (EMERGENCY)
Facility: HOSPITAL | Age: 50
Discharge: HOME | End: 2025-05-11
Attending: EMERGENCY MEDICINE | Admitting: EMERGENCY MEDICINE
Payer: COMMERCIAL

## 2025-05-11 ENCOUNTER — APPOINTMENT (EMERGENCY)
Dept: RADIOLOGY | Facility: HOSPITAL | Age: 50
End: 2025-05-11
Payer: COMMERCIAL

## 2025-05-11 ENCOUNTER — APPOINTMENT (EMERGENCY)
Dept: CT IMAGING | Facility: HOSPITAL | Age: 50
End: 2025-05-11
Payer: COMMERCIAL

## 2025-05-11 VITALS
TEMPERATURE: 98 F | OXYGEN SATURATION: 99 % | DIASTOLIC BLOOD PRESSURE: 58 MMHG | SYSTOLIC BLOOD PRESSURE: 97 MMHG | RESPIRATION RATE: 18 BRPM | HEART RATE: 76 BPM

## 2025-05-11 DIAGNOSIS — R10.13 EPIGASTRIC ABDOMINAL PAIN: Primary | ICD-10-CM

## 2025-05-11 DIAGNOSIS — R11.2 NAUSEA & VOMITING: ICD-10-CM

## 2025-05-11 LAB
ALBUMIN SERPL BCG-MCNC: 4.8 G/DL (ref 3.5–5)
ALP SERPL-CCNC: 77 U/L (ref 34–104)
ALT SERPL W P-5'-P-CCNC: 16 U/L (ref 7–52)
ANION GAP SERPL CALCULATED.3IONS-SCNC: 8 MMOL/L (ref 4–13)
APTT PPP: 24 SECONDS (ref 23–34)
AST SERPL W P-5'-P-CCNC: 18 U/L (ref 13–39)
ATRIAL RATE: 74 BPM
BASOPHILS # BLD AUTO: 0.04 THOUSANDS/ÂΜL (ref 0–0.1)
BASOPHILS NFR BLD AUTO: 1 % (ref 0–1)
BILIRUB SERPL-MCNC: 0.42 MG/DL (ref 0.2–1)
BILIRUB UR QL STRIP: NEGATIVE
BUN SERPL-MCNC: 14 MG/DL (ref 5–25)
CALCIUM SERPL-MCNC: 9.3 MG/DL (ref 8.4–10.2)
CHLORIDE SERPL-SCNC: 104 MMOL/L (ref 96–108)
CLARITY UR: CLEAR
CO2 SERPL-SCNC: 26 MMOL/L (ref 21–32)
COLOR UR: COLORLESS
CREAT SERPL-MCNC: 0.74 MG/DL (ref 0.6–1.3)
EOSINOPHIL # BLD AUTO: 0.06 THOUSAND/ÂΜL (ref 0–0.61)
EOSINOPHIL NFR BLD AUTO: 1 % (ref 0–6)
ERYTHROCYTE [DISTWIDTH] IN BLOOD BY AUTOMATED COUNT: 13.7 % (ref 11.6–15.1)
ETHANOL SERPL-MCNC: 82 MG/DL
GFR SERPL CREATININE-BSD FRML MDRD: 94 ML/MIN/1.73SQ M
GLUCOSE SERPL-MCNC: 103 MG/DL (ref 65–140)
GLUCOSE UR STRIP-MCNC: NEGATIVE MG/DL
HCG SERPL QL: NEGATIVE
HCT VFR BLD AUTO: 40.4 % (ref 34.8–46.1)
HGB BLD-MCNC: 13.1 G/DL (ref 11.5–15.4)
HGB UR QL STRIP.AUTO: NEGATIVE
IMM GRANULOCYTES # BLD AUTO: 0.02 THOUSAND/UL (ref 0–0.2)
IMM GRANULOCYTES NFR BLD AUTO: 0 % (ref 0–2)
INR PPP: 0.94 (ref 0.85–1.19)
KETONES UR STRIP-MCNC: NEGATIVE MG/DL
LACTATE SERPL-SCNC: 1.7 MMOL/L (ref 0.5–2)
LEUKOCYTE ESTERASE UR QL STRIP: NEGATIVE
LIPASE SERPL-CCNC: 43 U/L (ref 11–82)
LYMPHOCYTES # BLD AUTO: 1.02 THOUSANDS/ÂΜL (ref 0.6–4.47)
LYMPHOCYTES NFR BLD AUTO: 16 % (ref 14–44)
MAGNESIUM SERPL-MCNC: 2.1 MG/DL (ref 1.9–2.7)
MCH RBC QN AUTO: 28.1 PG (ref 26.8–34.3)
MCHC RBC AUTO-ENTMCNC: 32.4 G/DL (ref 31.4–37.4)
MCV RBC AUTO: 87 FL (ref 82–98)
MONOCYTES # BLD AUTO: 0.31 THOUSAND/ÂΜL (ref 0.17–1.22)
MONOCYTES NFR BLD AUTO: 5 % (ref 4–12)
NEUTROPHILS # BLD AUTO: 5 THOUSANDS/ÂΜL (ref 1.85–7.62)
NEUTS SEG NFR BLD AUTO: 77 % (ref 43–75)
NITRITE UR QL STRIP: NEGATIVE
NRBC BLD AUTO-RTO: 0 /100 WBCS
P AXIS: 70 DEGREES
PH UR STRIP.AUTO: 6 [PH]
PLATELET # BLD AUTO: 254 THOUSANDS/UL (ref 149–390)
PMV BLD AUTO: 9.9 FL (ref 8.9–12.7)
POTASSIUM SERPL-SCNC: 3.5 MMOL/L (ref 3.5–5.3)
PR INTERVAL: 200 MS
PROT SERPL-MCNC: 7.8 G/DL (ref 6.4–8.4)
PROT UR STRIP-MCNC: NEGATIVE MG/DL
PROTHROMBIN TIME: 13.3 SECONDS (ref 12.3–15)
QRS AXIS: 60 DEGREES
QRSD INTERVAL: 96 MS
QT INTERVAL: 428 MS
QTC INTERVAL: 475 MS
RBC # BLD AUTO: 4.66 MILLION/UL (ref 3.81–5.12)
SODIUM SERPL-SCNC: 138 MMOL/L (ref 135–147)
SP GR UR STRIP.AUTO: 1.02 (ref 1–1.03)
T WAVE AXIS: 61 DEGREES
UROBILINOGEN UR STRIP-ACNC: <2 MG/DL
VENTRICULAR RATE: 74 BPM
WBC # BLD AUTO: 6.45 THOUSAND/UL (ref 4.31–10.16)

## 2025-05-11 PROCEDURE — 85730 THROMBOPLASTIN TIME PARTIAL: CPT | Performed by: EMERGENCY MEDICINE

## 2025-05-11 PROCEDURE — 99285 EMERGENCY DEPT VISIT HI MDM: CPT | Performed by: EMERGENCY MEDICINE

## 2025-05-11 PROCEDURE — 85610 PROTHROMBIN TIME: CPT | Performed by: EMERGENCY MEDICINE

## 2025-05-11 PROCEDURE — 82077 ASSAY SPEC XCP UR&BREATH IA: CPT | Performed by: EMERGENCY MEDICINE

## 2025-05-11 PROCEDURE — 93010 ELECTROCARDIOGRAM REPORT: CPT | Performed by: STUDENT IN AN ORGANIZED HEALTH CARE EDUCATION/TRAINING PROGRAM

## 2025-05-11 PROCEDURE — 71045 X-RAY EXAM CHEST 1 VIEW: CPT

## 2025-05-11 PROCEDURE — 93005 ELECTROCARDIOGRAM TRACING: CPT

## 2025-05-11 PROCEDURE — 96365 THER/PROPH/DIAG IV INF INIT: CPT

## 2025-05-11 PROCEDURE — 96375 TX/PRO/DX INJ NEW DRUG ADDON: CPT

## 2025-05-11 PROCEDURE — 83690 ASSAY OF LIPASE: CPT

## 2025-05-11 PROCEDURE — 83605 ASSAY OF LACTIC ACID: CPT | Performed by: EMERGENCY MEDICINE

## 2025-05-11 PROCEDURE — 83735 ASSAY OF MAGNESIUM: CPT | Performed by: EMERGENCY MEDICINE

## 2025-05-11 PROCEDURE — 81003 URINALYSIS AUTO W/O SCOPE: CPT | Performed by: EMERGENCY MEDICINE

## 2025-05-11 PROCEDURE — 99284 EMERGENCY DEPT VISIT MOD MDM: CPT

## 2025-05-11 PROCEDURE — 36415 COLL VENOUS BLD VENIPUNCTURE: CPT | Performed by: EMERGENCY MEDICINE

## 2025-05-11 PROCEDURE — 96361 HYDRATE IV INFUSION ADD-ON: CPT

## 2025-05-11 PROCEDURE — 84703 CHORIONIC GONADOTROPIN ASSAY: CPT | Performed by: EMERGENCY MEDICINE

## 2025-05-11 PROCEDURE — 74177 CT ABD & PELVIS W/CONTRAST: CPT

## 2025-05-11 PROCEDURE — 80053 COMPREHEN METABOLIC PANEL: CPT

## 2025-05-11 PROCEDURE — 85025 COMPLETE CBC W/AUTO DIFF WBC: CPT

## 2025-05-11 RX ORDER — ONDANSETRON 2 MG/ML
4 INJECTION INTRAMUSCULAR; INTRAVENOUS ONCE
Status: COMPLETED | OUTPATIENT
Start: 2025-05-11 | End: 2025-05-11

## 2025-05-11 RX ORDER — ONDANSETRON 4 MG/1
4 TABLET, ORALLY DISINTEGRATING ORAL EVERY 6 HOURS PRN
Qty: 20 TABLET | Refills: 0 | Status: SHIPPED | OUTPATIENT
Start: 2025-05-11

## 2025-05-11 RX ORDER — ACETAMINOPHEN 10 MG/ML
1000 INJECTION, SOLUTION INTRAVENOUS ONCE
Status: COMPLETED | OUTPATIENT
Start: 2025-05-11 | End: 2025-05-11

## 2025-05-11 RX ORDER — FAMOTIDINE 10 MG/ML
20 INJECTION, SOLUTION INTRAVENOUS ONCE
Status: COMPLETED | OUTPATIENT
Start: 2025-05-11 | End: 2025-05-11

## 2025-05-11 RX ADMIN — SODIUM CHLORIDE 1000 ML: 0.9 INJECTION, SOLUTION INTRAVENOUS at 03:06

## 2025-05-11 RX ADMIN — ACETAMINOPHEN 1000 MG: 10 INJECTION INTRAVENOUS at 03:51

## 2025-05-11 RX ADMIN — ONDANSETRON 4 MG: 2 INJECTION INTRAMUSCULAR; INTRAVENOUS at 03:06

## 2025-05-11 RX ADMIN — FAMOTIDINE 20 MG: 10 INJECTION INTRAVENOUS at 03:12

## 2025-05-11 RX ADMIN — IOHEXOL 100 ML: 350 INJECTION, SOLUTION INTRAVENOUS at 04:00

## 2025-05-11 NOTE — DISCHARGE INSTRUCTIONS
Patient Education     Nausea and Vomiting, Adult ED   General Information   You came to the Emergency Department (ED) for nausea and vomiting. When you feel sick to your stomach, this is nausea. When you throw up, this is vomiting. Often, nausea and vomiting are caused by a virus. But they can also be caused by more serious things like an infection around the brain. The staff felt the risk of a serious cause for your nausea and vomiting is low.  If a virus is causing your nausea and vomiting, it is easy to spread from person to person. You can lower this risk by washing your hands often. Most of the time, your symptoms will go away without treatment in a few days.  You may be waiting on some test results. The staff will contact you if there are concerning results.  What care is needed at home?   Call your regular doctor to let them know you were in the ED. Make a follow-up appointment if you were told to.  Drink small amounts of fluid every 15 to 30 minutes. Good fluids to drink are water, broth, and oral electrolyte solutions. Sugar-free or very low sugar sports drinks are also OK.  Once you feel you can eat, start with crackers, toast, or cereal. Then eat small amounts of food more often.  Wash your hands often. This will help keep others healthy.  Avoid alcohol and caffeine.  If you have diabetes, check your blood sugar more often than usual. Being sick can affect your blood sugar levels.  When do I need to get emergency help?   Call for an ambulance right away if:   You have vomiting along with a fever and severe headache or stiff neck.  You have vomiting along with severe chest or belly pain or trouble breathing.  You are vomiting large amounts of blood (more than 1 teaspoon or 5 mL).  Return to the ED if:   You have signs of severe fluid loss, such as:  No urine for more than 8 hours.  Feel very light-headed or like you are going to pass out.  Feel weak like you are going to fall.  Feel like your heart is  beating very fast.  You feel extremely weak, like you are going to pass out.  You are vomiting multiple times every hour.  When do I need to call the doctor?   You develop early signs of fluid loss, such as:  Dark-colored urine.  Dry mouth.  Muscle cramps.  Lack of energy.  Feeling light-headed when you get up.  You have a small amount of blood (less than 1 teaspoon or 5 mL) in your vomit or bowel movements.  You throw up something that looks like coffee grounds.  You have a bowel movement that is black and looks like tar.  You are not able to keep fluids down.  You have a fever of 100.4º F (38°C) or higher or chills that do not go away after a day.  You have new or worsening symptoms.  Last Reviewed Date   2021-05-21  Consumer Information Use and Disclaimer   This generalized information is a limited summary of diagnosis, treatment, and/or medication information. It is not meant to be comprehensive and should be used as a tool to help the user understand and/or assess potential diagnostic and treatment options. It does NOT include all information about conditions, treatments, medications, side effects, or risks that may apply to a specific patient. It is not intended to be medical advice or a substitute for the medical advice, diagnosis, or treatment of a health care provider based on the health care provider's examination and assessment of a patient’s specific and unique circumstances. Patients must speak with a health care provider for complete information about their health, medical questions, and treatment options, including any risks or benefits regarding use of medications. This information does not endorse any treatments or medications as safe, effective, or approved for treating a specific patient. [employer] and its affiliates disclaim any warranty or liability relating to this information or the use thereof. The use of this information is governed by the Terms of Use, available at  https://www.[employer].com/en/know/clinical-effectiveness-terms   Copyright   Copyright © 2024 [employer] and its affiliates and/or licensors. All rights reserved.

## 2025-05-11 NOTE — ED PROVIDER NOTES
Time reflects when diagnosis was documented in both MDM as applicable and the Disposition within this note       Time User Action Codes Description Comment    5/11/2025  5:27 AM Fahad, Eren Add [R10.13] Epigastric abdominal pain     5/11/2025  5:27 AM Fahad, Eren Add [R11.2] Nausea & vomiting     5/11/2025  7:45 AM Fahad, Eren Add [R11.2] Nausea and vomiting, unspecified vomiting type     5/11/2025  7:45 AM Fahad, Eren Remove [R11.2] Nausea and vomiting, unspecified vomiting type           ED Disposition       ED Disposition   Discharge    Condition   Stable    Date/Time   Sun May 11, 2025  5:28 AM    Comment   Kandy Stone discharge to home/self care.                   Assessment & Plan       Medical Decision Making  Kandy Stone is a 49 y.o. female with a past medical history of adenomyosis, dysmenorrhea, thrombosed external hemorrhoid being evaluated for epigastric abdominal pain, nausea, vomiting.    Differential diagnoses include but not limited to: Alcohol intoxication, electrolyte abnormality, pancreatitis, peptic ulcer disease, cardiac arrhythmia, infection, pneumonia    Initial workup to include: CBC, CMP, lipase, lactate, urine pregnancy test, UA, ethanol, EKG, chest x-ray, CT abdomen pelvis    See ED Course for data/imaging interpretation and further MDM.    Disposition: No acute abnormalities seen on blood work.  Ethanol of 82.  CT abdomen pelvis without any acute abnormalities noted.  Suspect alcohol intoxication as etiology of symptoms.  Patient reports some improvement following IV fluids, Tylenol, famotidine, Zofran in ED.  Given prescription for Zofran ODT for relief of nausea outpatient.  Discharged home with return precautions stable condition.      Amount and/or Complexity of Data Reviewed  Labs: ordered. Decision-making details documented in ED Course.  Radiology: ordered and independent interpretation performed. Decision-making details documented in ED Course.  ECG/medicine tests:   Decision-making details documented in ED Course.    Risk  Prescription drug management.        ED Course as of 05/11/25 0746   Sun May 11, 2025   0347 CBC and differential(!)  WNL, no concerning values   0347 Comprehensive metabolic panel  No electrolyte abnormalities.  Appropriate kidney function, no BAY   0417 ETHANOL(!): 82  Elevated, patient reports drinking 3 glasses of whiskey the night   0419 LIPASE: 43  Within normal limits, low suspicion for acute pancreatitis   0420 LACTIC ACID: 1.7  Within normal limits   0420 MAGNESIUM: 2.1  Within normal limits   0420 PREGNANCY, SERUM: Negative  Negative pregnancy test   0420 ECG 12 lead  Normal sinus rhythm   0430 XR chest 1 view portable  No acute cardiopulmonary abnormalities noted per my interpretation   0440 UA w Reflex to Microscopic w Reflex to Culture  Negative nitrites, leukocytes doubt urinary tract infection at this time   0523 CT abdomen pelvis with contrast  IMPRESSION:     No acute intra-abdominal abnormality. No free air or free fluid.     No evidence of large or small bowel obstruction.     Uterus is retroverted with fluid in the endometrial canal. Wall thickening of the cervix. The findings are similar in appearance to the prior exam.        Workstation performed: [id number]         Medications   sodium chloride 0.9 % bolus 1,000 mL (0 mL Intravenous Stopped 5/11/25 0535)   ondansetron (ZOFRAN) injection 4 mg (4 mg Intravenous Given 5/11/25 0306)   Famotidine (PF) (PEPCID) injection 20 mg (20 mg Intravenous Given 5/11/25 0312)   acetaminophen (Ofirmev) injection 1,000 mg (0 mg Intravenous Stopped 5/11/25 0427)   iohexol (OMNIPAQUE) 350 MG/ML injection (MULTI-DOSE) 100 mL (100 mL Intravenous Given 5/11/25 0400)       ED Risk Strat Scores                    No data recorded        SBIRT 22yo+      Flowsheet Row Most Recent Value   Initial Alcohol Screen: US AUDIT-C     1. How often do you have a drink containing alcohol? 1 Filed at: 05/11/2025 0309   2.  How many drinks containing alcohol do you have on a typical day you are drinking?  2 Filed at: 05/11/2025 0309   3b. FEMALE Any Age, or MALE 65+: How often do you have 4 or more drinks on one occassion? 0 Filed at: 05/11/2025 0309   Audit-C Score 3 Filed at: 05/11/2025 0309   LUISANA: How many times in the past year have you...    Used an illegal drug or used a prescription medication for non-medical reasons? Never Filed at: 05/11/2025 0309                            History of Present Illness       Chief Complaint   Patient presents with    Abdominal Pain     Pt presents with  and friend who say that ellie pt had 3 whiskey drinks and developed abd pain. Pt doesn't normally drink. Pt also developed SOB. Pt reports abd pain is located in middle.        Past Medical History:   Diagnosis Date    Anemia     COVID 04/2021      Past Surgical History:   Procedure Laterality Date    AUGMENTATION MAMMAPLASTY Bilateral     2011, saline    AUGMENTATION MAMMAPLASTY Bilateral     implants redone 6/30/21    COSMETIC SURGERY Bilateral     Breast implants    HI HYSTEROSCOPY BX ENDOMETRIUM&/POLYPC W/WO D&C N/A 12/13/2022    Procedure: D&C W/ HYSTEROSCOPY;  Surgeon: Collin Jarquin MD;  Location: [facility];  Service: Gynecology    HI HYSTEROSCOPY BX ENDOMETRIUM&/POLYPC W/WO D&C N/A 12/13/2022    Procedure: Endometrial Polypectomy;  Surgeon: Collin Jarquin MD;  Location: [facility];  Service: Gynecology      Family History   Problem Relation Age of Onset    No Known Problems Mother     Hypertension Father     Breast cancer Sister         47    No Known Problems Daughter     No Known Problems Maternal Grandmother     No Known Problems Maternal Grandfather     No Known Problems Paternal Grandmother     No Known Problems Paternal Grandfather     No Known Problems Daughter     Breast cancer Maternal Aunt     Stomach cancer Maternal Aunt       Social History     Tobacco Use    Smoking status: Never    Smokeless tobacco: Never   Vaping  Use    Vaping status: Never Used   Substance Use Topics    Alcohol use: Never    Drug use: Never      E-Cigarette/Vaping    E-Cigarette Use Never User       E-Cigarette/Vaping Substances    Nicotine No     THC No     CBD No     Flavoring No     Other No     Unknown No       I have reviewed and agree with the history as documented.     Kandy Stone is a 49 y.o. female with a past medical history of adenomyosis, dysmenorrhea, thrombosed external hemorrhoid being evaluated for epigastric abdominal pain, nausea, vomiting.  Patient presenting to ED with  and friend following drinking 3 whiskey drinks [place].  Patient developed abdominal pain and sensation of nausea followed by multiple episodes of vomiting in ED.  Patient's friend states that patient does not often drink alcohol.  Denies any other drug use.  She also endorses mild shortness of breath on initial evaluation.  Moving bowels appropriately denies any constipation or diarrhea.  No urinary urgency frequency or dysuria.            Abdominal Pain  Associated symptoms: nausea and vomiting        Review of Systems   Gastrointestinal:  Positive for abdominal pain (Epigastric), nausea and vomiting.   All other systems reviewed and are negative.          Objective       ED Triage Vitals   Temperature Pulse Blood Pressure Respirations SpO2 Patient Position - Orthostatic VS   05/11/25 0238 05/11/25 0207 05/11/25 0207 05/11/25 0207 05/11/25 0207 05/11/25 0207   (!) 97.4 °F (36.3 °C) 78 115/62 20 99 % Lying      Temp Source Heart Rate Source BP Location FiO2 (%) Pain Score    05/11/25 0238 05/11/25 0207 05/11/25 0535 -- --    Oral Monitor Left arm        Vitals      Date and Time Temp Pulse SpO2 Resp BP Pain Score FACES Pain Rating User   05/11/25 0535 98 °F (36.7 °C) 76 99 % 18 97/58 -- -- KW   05/11/25 0238 97.4 °F (36.3 °C) -- -- -- -- -- -- CG   05/11/25 0207 -- 78 99 % 20 115/62 -- -- KD            Physical Exam  Constitutional:       Appearance:  Normal appearance. She is ill-appearing.   Eyes:      Extraocular Movements: Extraocular movements intact.      Conjunctiva/sclera: Conjunctivae normal.      Pupils: Pupils are equal, round, and reactive to light.   Cardiovascular:      Rate and Rhythm: Normal rate and regular rhythm.      Pulses: Normal pulses.      Heart sounds: Normal heart sounds. No murmur heard.     No friction rub. No gallop.   Pulmonary:      Effort: Pulmonary effort is normal. No respiratory distress.      Breath sounds: Normal breath sounds. No stridor. No wheezing, rhonchi or rales.   Abdominal:      General: Abdomen is flat. Bowel sounds are normal. There is no distension.      Palpations: Abdomen is soft.      Tenderness: There is abdominal tenderness in the epigastric area. There is no right CVA tenderness, left CVA tenderness, guarding or rebound.   Skin:     General: Skin is warm and dry.      Capillary Refill: Capillary refill takes less than 2 seconds.   Neurological:      General: No focal deficit present.      Mental Status: She is alert and oriented to person, place, and time. Mental status is at baseline.         Results Reviewed       Procedure Component Value Units Date/Time    UA w Reflex to Microscopic w Reflex to Culture [[id number]] Collected: 05/11/25 0427    Lab Status: Final result Specimen: Urine, Clean Catch Updated: 05/11/25 0437     Color, UA Colorless     Clarity, UA Clear     Specific Gravity, UA 1.021     pH, UA 6.0     Leukocytes, UA Negative     Nitrite, UA Negative     Protein, UA Negative mg/dl      Glucose, UA Negative mg/dl      Ketones, UA Negative mg/dl      Urobilinogen, UA <2.0 mg/dl      Bilirubin, UA Negative     Occult Blood, UA Negative    hCG, qualitative pregnancy [[id number]]  (Normal) Collected: 05/11/25 0303    Lab Status: Final result Specimen: Blood from Arm, Right Updated: 05/11/25 0340     Preg, Serum Negative    Lactic acid, plasma (w/reflex if result > 2.0) [[id number]]  (Normal) Collected:  05/11/25 0303    Lab Status: Final result Specimen: Blood from Arm, Right Updated: 05/11/25 0333     LACTIC ACID 1.7 mmol/L     Narrative:      Result may be elevated if tourniquet was used during collection.    Comprehensive metabolic panel [[id number]] Collected: 05/11/25 0303    Lab Status: Final result Specimen: Blood from Arm, Right Updated: 05/11/25 0333     Sodium 138 mmol/L      Potassium 3.5 mmol/L      Chloride 104 mmol/L      CO2 26 mmol/L      ANION GAP 8 mmol/L      BUN 14 mg/dL      Creatinine 0.74 mg/dL      Glucose 103 mg/dL      Calcium 9.3 mg/dL      AST 18 U/L      ALT 16 U/L      Alkaline Phosphatase 77 U/L      Total Protein 7.8 g/dL      Albumin 4.8 g/dL      Total Bilirubin 0.42 mg/dL      eGFR 94 ml/min/1.73sq m     Narrative:      [employer] Kid[employer] guidelines for Chronic Kidney Disease (CKD):     Stage 1 with normal or high GFR (GFR > 90 mL/min/1.73 square meters)    Stage 2 Mild CKD (GFR = 60-89 mL/min/1.73 square meters)    Stage 3A Moderate CKD (GFR = 45-59 mL/min/1.73 square meters)    Stage 3B Moderate CKD (GFR = 30-44 mL/min/1.73 square meters)    Stage 4 Severe CKD (GFR = 15-29 mL/min/1.73 square meters)    Stage 5 End Stage CKD (GFR <15 mL/min/1.73 square meters)  Note: GFR calculation is accurate only with a steady state creatinine    Lipase [[id number]]  (Normal) Collected: 05/11/25 0303    Lab Status: Final result Specimen: Blood from Arm, Right Updated: 05/11/25 0333     Lipase 43 u/L     Magnesium [[id number]]  (Normal) Collected: 05/11/25 0303    Lab Status: Final result Specimen: Blood from Arm, Right Updated: 05/11/25 0333     Magnesium 2.1 mg/dL     Ethanol [[id number]]  (Abnormal) Collected: 05/11/25 0303    Lab Status: Final result Specimen: Blood from Arm, Right Updated: 05/11/25 0332     Ethanol Lvl 82 mg/dL     Protime-INR [[id number]]  (Normal) Collected: 05/11/25 0303    Lab Status: Final result Specimen: Blood from Arm, Right Updated: 05/11/25 4873      Protime 13.3 seconds      INR 0.94    Narrative:      INR Therapeutic Range    Indication                                             INR Range      Atrial Fibrillation                                               2.0-3.0  Hypercoagulable State                                    2.0.2.3  Left Ventricular Asist Device                            2.0-3.0  Mechanical Heart Valve                                  -    Aortic(with afib, MI, embolism, HF, LA enlargement,    and/or coagulopathy)                                     2.0-3.0 (2.5-3.5)     Mitral                                                             2.5-3.5  Prosthetic/Bioprosthetic Heart Valve               2.0-3.0  Venous thromboembolism (VTE: VT, PE        2.0-3.0    APTT [[id number]]  (Normal) Collected: 05/11/25 0303    Lab Status: Final result Specimen: Blood from Arm, Right Updated: 05/11/25 0328     PTT 24 seconds     CBC and differential [[id number]]  (Abnormal) Collected: 05/11/25 0303    Lab Status: Final result Specimen: Blood from Arm, Right Updated: 05/11/25 0312     WBC 6.45 Thousand/uL      RBC 4.66 Million/uL      Hemoglobin 13.1 g/dL      Hematocrit 40.4 %      MCV 87 fL      MCH 28.1 pg      MCHC 32.4 g/dL      RDW 13.7 %      MPV 9.9 fL      Platelets 254 Thousands/uL      nRBC 0 /100 WBCs      Segmented % 77 %      Immature Grans % 0 %      Lymphocytes % 16 %      Monocytes % 5 %      Eosinophils Relative 1 %      Basophils Relative 1 %      Absolute Neutrophils 5.00 Thousands/µL      Absolute Immature Grans 0.02 Thousand/uL      Absolute Lymphocytes 1.02 Thousands/µL      Absolute Monocytes 0.31 Thousand/µL      Eosinophils Absolute 0.06 Thousand/µL      Basophils Absolute 0.04 Thousands/µL             CT abdomen pelvis with contrast   Final Interpretation by Julio Rivas MD (05/11 0501)      No acute intra-abdominal abnormality. No free air or free fluid.      No evidence of large or small bowel obstruction.      Uterus is  retroverted with fluid in the endometrial canal. Wall thickening of the cervix. The findings are similar in appearance to the prior exam.         Workstation performed: [id number]         XR chest 1 view portable   ED Interpretation by Eren Vásquez DO (05/11 0430)   No acute cardiopulmonary abnormalities noted per my interpretation          ECG 12 Lead Documentation Only    Date/Time: 5/11/2025 6:18 AM    Performed by: Eren Vásquez DO  Authorized by: Eren Vásquez DO    ECG reviewed by me, the ED Provider: yes    Patient location:  ED  Previous ECG:     Previous ECG:  Compared to current    Similarity:  No change  Interpretation:     Interpretation: normal    Rate:     ECG rate:  74    ECG rate assessment: normal    Rhythm:     Rhythm: sinus rhythm    Ectopy:     Ectopy: none    QRS:     QRS axis:  Normal    QRS intervals:  Normal  Conduction:     Conduction: normal    ST segments:     ST segments:  Normal  T waves:     T waves: normal        ED Medication and Procedure Management   Prior to Admission Medications   Prescriptions Last Dose Informant Patient Reported? Taking?   phenylephrine-shark liver oil-mineral oil-petrolatum (PREPARATION H) 0.25-3-14-71.9 % rectal ointment   No No   Sig: Insert into the rectum 2 (two) times a day as needed for hemorrhoids      Facility-Administered Medications: None     Discharge Medication List as of 5/11/2025  5:29 AM        START taking these medications    Details   ondansetron (ZOFRAN-ODT) 4 mg disintegrating tablet Take 1 tablet (4 mg total) by mouth every 6 (six) hours as needed for vomiting, Starting Sun 5/11/2025, Normal           CONTINUE these medications which have NOT CHANGED    Details   phenylephrine-shark liver oil-mineral oil-petrolatum (PREPARATION H) 0.25-3-14-71.9 % rectal ointment Insert into the rectum 2 (two) times a day as needed for hemorrhoids, Starting Mon 4/28/2025, Normal           No discharge procedures on file.  ED SEPSIS DOCUMENTATION   Time reflects  when diagnosis was documented in both MDM as applicable and the Disposition within this note       Time User Action Codes Description Comment    5/11/2025  5:27 AM Eren Vásquez Add [R10.13] Epigastric abdominal pain     5/11/2025  5:27 AM FahadEren noel Add [R11.2] Nausea & vomiting     5/11/2025  7:45 AM FahadEren noel Add [R11.2] Nausea and vomiting, unspecified vomiting type     5/11/2025  7:45 AM FahadEren noel Remove [R11.2] Nausea and vomiting, unspecified vomiting type                  Eren Vásquez, DO  05/11/25 0746

## 2025-05-11 NOTE — ED ATTENDING ATTESTATION
5/11/2025  I, Stephen Watson MD, saw and evaluated the patient. I have discussed the patient with the resident/non-physician practitioner and agree with the resident's/non-physician practitioner's findings, Plan of Care, and MDM as documented in the resident's/non-physician practitioner's note, except where noted. All available labs and Radiology studies were reviewed.  I was present for key portions of any procedure(s) performed by the resident/non-physician practitioner and I was immediately available to provide assistance.       At this point I agree with the current assessment done in the Emergency Department.  I have conducted an independent evaluation of this patient a history and physical is as follows: Patient is a 49 year old female with abdominal pain with N/V tonight. No diarrhea. No GI bleeding. No urinary sx. No abdominal surgery. LMP - 4 weeks ago. Was last seen at  [facility] on 4/30/25 for thrombosed external hemorrhoid. [facility] website checked on this patient and no Rx found. NCAT. No scleral icterus. Mucous membranes somewhat moist. Lungs clear. Heart regular without murmur. Abdomen soft with diffuse upper abdominal tenderness. (+) bowel sounds. No guarding or rebound. No edema. No rash noted. No jaundice. DDx including but not limited to: appendicitis, gastroenteritis, gastritis, PUD, GERD, gastroparesis, hepatitis, pancreatitis, colitis, enteritis, diverticulitis, food poisoning, mesenteric adenitis, epiploic appendagitis, mesenteric panniculitis, mesenteric ischemia, IBD, IBS, ileus, bowel obstruction, volvulus, internal hernia, AAA, cholecystitis, biliary colic, choledocholithiasis, perforated viscus, tumor, splenic etiology, constipation, pelvic pathology, renal colic, pyelonephritis, UTI; doubt cardiac etiology. Will check EKG, labs, CXR and CT and give IVFs and IV pepcid and IV zofran.     ED Course         Critical Care Time  Procedures

## 2025-06-27 ENCOUNTER — VBI (OUTPATIENT)
Dept: ADMINISTRATIVE | Facility: OTHER | Age: 50
End: 2025-06-27

## 2025-06-27 NOTE — TELEPHONE ENCOUNTER
06/27/25 12:23 PM     Chart reviewed for Mammogram ; nothing is submitted to the patient's insurance at this time.     Cate Rose MA   [facility]

## (undated) DEVICE — ENDOMETRIAL BX PIPELLE

## (undated) DEVICE — UNDER BUTTOCKS DRAPE W/FLUID CONTROL POUCH: Brand: CONVERTORS

## (undated) DEVICE — PREMIUM DRY TRAY LF: Brand: MEDLINE INDUSTRIES, INC.

## (undated) DEVICE — SURGICAL GOWN, XL SMARTSLEEVE: Brand: CONVERTORS

## (undated) DEVICE — 2000CC GUARDIAN II: Brand: GUARDIAN

## (undated) DEVICE — GLOVE INDICATOR PI UNDERGLOVE SZ 8 BLUE

## (undated) DEVICE — GLOVE PI ULTRA TOUCH SZ.8.0

## (undated) DEVICE — BETHLEHEM UNIVERSAL MINOR VAG: Brand: CARDINAL HEALTH

## (undated) DEVICE — STRL ALLENTOWN HYSTEROSCOPY PK: Brand: CARDINAL HEALTH

## (undated) DEVICE — PVC URETHRAL CATHETER: Brand: DOVER

## (undated) DEVICE — PACK FLUENT DISP

## (undated) DEVICE — CYSTO TUBING SINGLE IRRIGATION

## (undated) DEVICE — IV EXTENSION TUBING 33 IN

## (undated) DEVICE — LIGHT GLOVE GREEN

## (undated) DEVICE — SCD SEQUENTIAL COMPRESSION COMFORT SLEEVE MEDIUM KNEE LENGTH: Brand: KENDALL SCD